# Patient Record
Sex: FEMALE | Race: WHITE | Employment: OTHER | URBAN - METROPOLITAN AREA
[De-identification: names, ages, dates, MRNs, and addresses within clinical notes are randomized per-mention and may not be internally consistent; named-entity substitution may affect disease eponyms.]

---

## 2017-01-10 RX ORDER — BLOOD SUGAR DIAGNOSTIC
STRIP MISCELLANEOUS
Qty: 150 STRIP | Refills: 0 | Status: SHIPPED | OUTPATIENT
Start: 2017-01-10 | End: 2017-06-14 | Stop reason: SDUPTHER

## 2017-01-19 ENCOUNTER — TELEPHONE (OUTPATIENT)
Dept: FAMILY MEDICINE CLINIC | Age: 67
End: 2017-01-19

## 2017-01-19 DIAGNOSIS — Z12.11 COLON CANCER SCREENING: Primary | ICD-10-CM

## 2017-01-19 NOTE — TELEPHONE ENCOUNTER
I spoke with patient. He/she has decline colonoscopy screening. Patient is in agreement to do FOBT. Please order FOBT. I will mail to patient.

## 2017-01-20 DIAGNOSIS — Z12.11 COLON CANCER SCREENING: ICD-10-CM

## 2017-01-26 ENCOUNTER — DOCUMENTATION ONLY (OUTPATIENT)
Dept: FAMILY MEDICINE CLINIC | Age: 67
End: 2017-01-26

## 2017-03-04 LAB — HEMOCCULT STL QL IA: POSITIVE

## 2017-03-07 ENCOUNTER — OFFICE VISIT (OUTPATIENT)
Dept: FAMILY MEDICINE CLINIC | Age: 67
End: 2017-03-07

## 2017-03-07 VITALS
SYSTOLIC BLOOD PRESSURE: 160 MMHG | RESPIRATION RATE: 18 BRPM | TEMPERATURE: 97.1 F | HEART RATE: 82 BPM | HEIGHT: 62 IN | OXYGEN SATURATION: 97 % | DIASTOLIC BLOOD PRESSURE: 83 MMHG | BODY MASS INDEX: 30 KG/M2 | WEIGHT: 163 LBS

## 2017-03-07 DIAGNOSIS — Z13.39 SCREENING FOR ALCOHOLISM: ICD-10-CM

## 2017-03-07 DIAGNOSIS — E11.9 CONTROLLED TYPE 2 DIABETES MELLITUS WITHOUT COMPLICATION, WITHOUT LONG-TERM CURRENT USE OF INSULIN (HCC): Primary | ICD-10-CM

## 2017-03-07 DIAGNOSIS — Z11.59 SCREENING FOR VIRAL AND CHLAMYDIAL DISEASES: ICD-10-CM

## 2017-03-07 DIAGNOSIS — E78.5 HYPERLIPIDEMIA, UNSPECIFIED HYPERLIPIDEMIA TYPE: ICD-10-CM

## 2017-03-07 DIAGNOSIS — I10 ESSENTIAL HYPERTENSION: ICD-10-CM

## 2017-03-07 DIAGNOSIS — E55.9 VITAMIN D DEFICIENCY: ICD-10-CM

## 2017-03-07 DIAGNOSIS — Z12.11 SCREEN FOR COLON CANCER: ICD-10-CM

## 2017-03-07 DIAGNOSIS — M54.16 LUMBAR RADICULOPATHY, RIGHT: ICD-10-CM

## 2017-03-07 DIAGNOSIS — Z00.00 ROUTINE GENERAL MEDICAL EXAMINATION AT A HEALTH CARE FACILITY: ICD-10-CM

## 2017-03-07 DIAGNOSIS — Z11.8 SCREENING FOR VIRAL AND CHLAMYDIAL DISEASES: ICD-10-CM

## 2017-03-07 RX ORDER — CLONIDINE HYDROCHLORIDE 0.1 MG/1
TABLET ORAL
Qty: 270 TAB | Refills: 3 | Status: SHIPPED | OUTPATIENT
Start: 2017-03-07 | End: 2018-01-15 | Stop reason: SDUPTHER

## 2017-03-07 RX ORDER — GLIPIZIDE 5 MG/1
TABLET ORAL
Qty: 270 TAB | Refills: 3 | Status: SHIPPED | OUTPATIENT
Start: 2017-03-07 | End: 2017-04-10 | Stop reason: SDUPTHER

## 2017-03-07 RX ORDER — ALBUTEROL SULFATE 90 UG/1
2 AEROSOL, METERED RESPIRATORY (INHALATION)
Qty: 3 INHALER | Refills: 3 | Status: SHIPPED | OUTPATIENT
Start: 2017-03-07 | End: 2017-09-04 | Stop reason: SDUPTHER

## 2017-03-07 RX ORDER — LOSARTAN POTASSIUM 100 MG/1
100 TABLET ORAL DAILY
Qty: 90 TAB | Refills: 3 | Status: SHIPPED | OUTPATIENT
Start: 2017-03-07 | End: 2018-01-15 | Stop reason: SDUPTHER

## 2017-03-07 RX ORDER — METFORMIN HYDROCHLORIDE 1000 MG/1
1000 TABLET ORAL 2 TIMES DAILY
Qty: 180 TAB | Refills: 3 | Status: SHIPPED | OUTPATIENT
Start: 2017-03-07 | End: 2017-04-21 | Stop reason: SDUPTHER

## 2017-03-07 RX ORDER — FLUTICASONE PROPIONATE AND SALMETEROL 500; 50 UG/1; UG/1
1 POWDER RESPIRATORY (INHALATION) EVERY 12 HOURS
Qty: 3 INHALER | Refills: 3 | Status: SHIPPED | OUTPATIENT
Start: 2017-03-07 | End: 2017-11-19 | Stop reason: SDUPTHER

## 2017-03-07 RX ORDER — TRAMADOL HYDROCHLORIDE 50 MG/1
50 TABLET ORAL
Qty: 40 TAB | Refills: 0 | Status: SHIPPED | OUTPATIENT
Start: 2017-03-07 | End: 2017-12-18 | Stop reason: ALTCHOICE

## 2017-03-07 RX ORDER — ALBUTEROL SULFATE 2.5 MG/.5ML
2.5 SOLUTION RESPIRATORY (INHALATION)
Qty: 150 ML | Refills: 3 | Status: SHIPPED | OUTPATIENT
Start: 2017-03-07 | End: 2018-01-15 | Stop reason: SDUPTHER

## 2017-03-07 RX ORDER — ALBUTEROL SULFATE 1.25 MG/3ML
1.25 SOLUTION RESPIRATORY (INHALATION)
Qty: 540 ML | Refills: 3 | Status: SHIPPED | OUTPATIENT
Start: 2017-03-07 | End: 2019-02-18 | Stop reason: SDUPTHER

## 2017-03-07 NOTE — PROGRESS NOTES
Zabrina Saez is a 78 y.o. female who presents today for annual wellness exam. Patient c/o RT leg pain. Pain scale 10/10    Health Maintenance Due   Topic Date Due    Hepatitis C Screening  1950    DTaP/Tdap/Td series (1 - Tdap) 10/01/1970    Pneumococcal 65+ Low/Medium Risk (1 of 2 - PCV13) 10/01/2014    EYE EXAM RETINAL OR DILATED Q1  07/13/2016    INFLUENZA AGE 9 TO ADULT  08/01/2016    MEDICARE YEARLY EXAM  11/13/2016    FOOT EXAM Q1  11/13/2016    MICROALBUMIN Q1  02/03/2017    LIPID PANEL Q1  02/03/2017    HEMOGLOBIN A1C Q6M  02/24/2017           1. Have you been to the ER, urgent care clinic since your last visit? Hospitalized since your last visit? No    2. Have you seen or consulted any other health care providers outside of the [address] [place] [address] since your last visit? Include any pap smears or colon screening. Yes Where: podiatry    Learning Assessment 4/29/2015   PRIMARY LEARNER Patient   HIGHEST LEVEL OF EDUCATION - PRIMARY LEARNER  DID NOT GRADUATE HIGH SCHOOL   BARRIERS PRIMARY LEARNER NONE   CO-LEARNER CAREGIVER No   PRIMARY LANGUAGE ENGLISH    NEED -   LEARNER PREFERENCE PRIMARY OTHER (COMMENT)   ANSWERED BY patient   RELATIONSHIP SELF       Abuse Screening Questionnaire 3/26/2014   Do you ever feel afraid of your partner? N   Are you in a relationship with someone who physically or mentally threatens you? N   Is it safe for you to go home?  Nick Benson

## 2017-03-07 NOTE — ACP (ADVANCE CARE PLANNING)
Advance Care Planning    Advance Care Planning (ACP) Provider Conversation Snapshot    Date of ACP Conversation: 03/07/17  Persons included in Conversation:  patient  Length of ACP Conversation in minutes:  25 minutes    Authorized Decision Maker (if patient is incapable of making informed decisions):    This person is:   Healthcare Agent/Medical Power of  under Advance Directive          For Patients with Decision Making Capacity:   Values/Goals: Exploration of values, goals, and preferences if recovery is not expected, even with continued medical treatment in the event of:  Imminent death  Severe, permanent brain injury    Conversation Outcomes / Follow-Up Plan:   Recommended completion of Advance Directive form after review of ACP materials and conversation with prospective healthcare agent

## 2017-03-07 NOTE — MR AVS SNAPSHOT
Visit Information Date & Time Provider Department Dept. Phone Encounter #  
 3/7/2017  1:45 PM Saadia Machuca, [address] [phone] [id number] Follow-up Instructions Return in about 6 months (around 9/7/2017). Follow-up and Disposition History Upcoming Health Maintenance Date Due  
 EYE EXAM RETINAL OR DILATED Q1 10/7/2018* GLAUCOMA SCREENING Q2Y 7/13/2017 HEMOGLOBIN A1C Q6M 9/7/2017 BREAST CANCER SCRN MAMMOGRAM 9/28/2017 FOOT EXAM Q1 3/7/2018 MICROALBUMIN Q1 3/7/2018 LIPID PANEL Q1 3/7/2018 MEDICARE YEARLY EXAM 3/8/2018 COLONOSCOPY 10/21/2024 DTaP/Tdap/Td series (2 - Td) 3/7/2027 *Topic was postponed. The date shown is not the original due date. Allergies as of 3/7/2017  Review Complete On: 3/7/2017 By: Saadia Machuca MD  
  
 Severity Noted Reaction Type Reactions Cefdinir  04/29/2015    Rash Current Immunizations  Reviewed on 10/21/2014 Name Date Influenza High Dose Vaccine PF 11/13/2015 Influenza Vaccine 10/21/2014  2:56 PM  
  
 Not reviewed this visit You Were Diagnosed With   
  
 Codes Comments Controlled type 2 diabetes mellitus without complication, without long-term current use of insulin ([facility].)    -  Primary ICD-10-CM: E11.9 ICD-9-CM: 250.00 Essential hypertension     ICD-10-CM: I10 
ICD-9-CM: 401.9 Hyperlipidemia, unspecified hyperlipidemia type     ICD-10-CM: E78.5 ICD-9-CM: 272.4 Vitamin D deficiency     ICD-10-CM: E55.9 ICD-9-CM: 268.9 Normal body mass index (BMI)     ICD-10-CM: Z78.9 ICD-9-CM: V49.89 Screening for viral and chlamydial diseases     ICD-10-CM: Z11.59, Z11.8 ICD-9-CM: V73.98, V73.99 Routine general medical examination at a health care facility     ICD-10-CM: Z00.00 ICD-9-CM: V70.0 Screening for alcoholism     ICD-10-CM: Z13.89 ICD-9-CM: V79.1 Screen for colon cancer     ICD-10-CM: Z12.11 ICD-9-CM: V76.51   
 Lumbar radiculopathy, right     ICD-10-CM: M54.16 
ICD-9-CM: 724.4 Vitals BP Pulse Temp Resp Height(growth percentile) Weight(growth percentile) 160/83 (BP 1 Location: Right arm, BP Patient Position: Sitting) 82 97.1 °F (36.2 °C) (Oral) 18 5' 2\" (1.575 m) 163 lb (73.9 kg) SpO2 BMI OB Status Smoking Status 97% 29.81 kg/m2 Postmenopausal Former Smoker BMI and BSA Data Body Mass Index Body Surface Area  
 29.81 kg/m 2 1.8 m 2 Preferred Pharmacy Pharmacy Name Phone [address] [facility], [facility] [address] [facility] Your Updated Medication List  
  
   
This list is accurate as of: 3/7/17  2:13 PM.  Always use your most recent med list.  
  
  
  
  
 * albuterol sulfate SR 4 mg ER tablet Commonly known as:  PROVENTIL SR Take 1 Tab by mouth every twelve (12) hours. * albuterol 90 mcg/actuation inhaler Commonly known as:  VENTOLIN HFA Take 2 Puffs by inhalation every six (6) hours as needed for Wheezing. * albuterol sulfate 2.5 mg/0.5 mL Nebu nebulizer solution Commonly known as:  PROVENTIL;VENTOLIN  
0.5 mL by Nebulization route every four (4) hours as needed for Wheezing. * albuterol 1.25 mg/3 mL Nebu Commonly known as:  ACCUNEB  
3 mL by Nebulization route every four (4) hours as needed. CALCIUM 600 + D 600-125 mg-unit Tab Generic drug:  calcium-cholecalciferol (d3) Take  by mouth.  
  
 cloNIDine HCl 0.1 mg tablet Commonly known as:  CATAPRES  
1 in AM, 2 in PM  
  
 fluticasone-salmeterol 500-50 mcg/dose diskus inhaler Commonly known as:  ADVAIR DISKUS Take 1 Puff by inhalation every twelve (12) hours. * glipiZIDE 5 mg tablet Commonly known as:  Daysi Limb Take 1 tablet by mouth two (2) times a day. * glipiZIDE 5 mg tablet Commonly known as:  Daysi Limb Take 1 in AM, 2 in PM  
  
 * glucose blood VI test strips strip Commonly known as:  ACCU-CHEK COMFORT CURVE TEST Test blood sugar bid and prn for diagnosis 250.00 * ACCU-CHEK SMARTVIEW TEST STRIP strip Generic drug:  glucose blood VI test strips USE  STRIP TWICE DAILY AND AS NEEDED  
  
 guaiFENesin-codeine 100-10 mg/5 mL solution Commonly known as:  ROBITUSSIN AC  
5-10 mL every 6 hours if needed for cough. ipratropium 0.02 % nebulizer solution Commonly known as:  ATROVENT  
2.5 mL by Nebulization route four (4) times daily as needed for Wheezing. linagliptin 5 mg tablet Commonly known as:  [place] Take 1 Tab by mouth daily. * losartan 50 mg tablet Commonly known as:  COZAAR Take 1 tablet by mouth daily. * losartan 100 mg tablet Commonly known as:  COZAAR Take 1 Tab by mouth daily. magnesium 250 mg Tab Take  by mouth.  
  
 meclizine 25 mg tablet Commonly known as:  ANTIVERT Take 1 Tab by mouth three (3) times daily as needed for Dizziness. meloxicam 15 mg tablet Commonly known as:  MOBIC Take 1 Tab by mouth daily. * metFORMIN 1,000 mg tablet Commonly known as:  GLUCOPHAGE Take 1 Tab by mouth two (2) times daily (with meals). * metFORMIN 1,000 mg tablet Commonly known as:  GLUCOPHAGE Take 1 Tab by mouth two (2) times a day. methylPREDNISolone 4 mg tablet Commonly known as:  Minesh Funk Follow dose pack instructions  
  
 mupirocin calcium 2 % topical cream  
Commonly known as:  [employer] Apply  to affected area two (2) times a day. nystatin powder Commonly known as:  MYCOSTATIN Apply daily after bathing  
  
 nystatin-triamcinolone topical cream  
Commonly known as:  MYCOLOG II Apply  to affected area two (2) times a day. * omeprazole 40 mg capsule Commonly known as:  PRILOSEC Take 1 Cap by mouth daily. * omeprazole 20 mg capsule Commonly known as:  PRILOSEC Take 1 capsule by mouth daily.   
  
 OTHER  
 accuchek comfort curve monitor kit * tiZANidine 2 mg tablet Commonly known as:  Clearance Brisa Take 1 Tab by mouth three (3) times daily as needed. * tiZANidine 2 mg tablet Commonly known as:  Clearance Brisa Take 1 Tab by mouth three (3) times daily as needed for Pain. * tiZANidine 2 mg tablet Commonly known as:  Clearance Brisa Take 1 Tab by mouth three (3) times daily as needed. traMADol 50 mg tablet Commonly known as:  ULTRAM  
Take 1 Tab by mouth every eight (8) hours as needed for Pain. Max Daily Amount: 150 mg. VITAMIN D3 1,000 unit tablet Generic drug:  cholecalciferol Take  by mouth daily. * Notice: This list has 17 medication(s) that are the same as other medications prescribed for you. Read the directions carefully, and ask your doctor or other care provider to review them with you. Prescriptions Printed Refills  
 metFORMIN (GLUCOPHAGE) 1,000 mg tablet 3 Sig: Take 1 Tab by mouth two (2) times a day. Class: Print Route: Oral  
 cloNIDine HCl (CATAPRES) 0.1 mg tablet 3 Si in AM, 2 in PM  
 Class: Print  
 albuterol (VENTOLIN HFA) 90 mcg/actuation inhaler 3 Sig: Take 2 Puffs by inhalation every six (6) hours as needed for Wheezing. Class: Print Route: Inhalation  
 fluticasone-salmeterol (ADVAIR DISKUS) 500-50 mcg/dose diskus inhaler 3 Sig: Take 1 Puff by inhalation every twelve (12) hours. Class: Print Route: Inhalation  
 losartan (COZAAR) 100 mg tablet 3 Sig: Take 1 Tab by mouth daily. Class: Print Route: Oral  
 glipiZIDE (GLUCOTROL) 5 mg tablet 3 Sig: Take 1 in AM, 2 in PM  
 Class: Print  
 albuterol sulfate (PROVENTIL;VENTOLIN) 2.5 mg/0.5 mL nebu nebulizer solution 3 Si.5 mL by Nebulization route every four (4) hours as needed for Wheezing. Class: Print Route: Nebulization  
 albuterol (ACCUNEB) 1.25 mg/3 mL nebu 3 Sig: 3 mL by Nebulization route every four (4) hours as needed. Class: Print Route: Nebulization  
 traMADol (ULTRAM) 50 mg tablet 0 Sig: Take 1 Tab by mouth every eight (8) hours as needed for Pain. Max Daily Amount: 150 mg.  
 Class: Print Route: Oral  
  
We Performed the Following REFERRAL TO GASTROENTEROLOGY [TSX80 Custom] Comments:  
 Please evaluate patient for colonoscopy. REFERRAL TO PAIN MANAGEMENT [ZMZ293 Custom] Comments:  
 Please evaluate patient for lumbar radiculopathy. Follow-up Instructions Return in about 6 months (around 9/7/2017). To-Do List   
 03/07/2017 Lab:  CBC WITH AUTOMATED DIFF   
  
 03/07/2017 Lab:  HEMOGLOBIN A1C WITH EAG   
  
 03/07/2017 Lab:  HEPATITIS C AB   
  
 03/07/2017 Lab:  METABOLIC PANEL, COMPREHENSIVE   
  
 03/07/2017 Lab:  MICROALBUMIN, UR, RAND W/ MICROALBUMIN/CREA RATIO   
  
 03/07/2017 Lab:  T4, FREE   
  
 03/07/2017 Lab:  TSH 3RD GENERATION   
  
 03/07/2017 Lab:  VITAMIN D, 25 HYDROXY Referral Information Referral ID Referred By [facility]erred To  
  
 [id number] [place] [address] [address] Phone: [phone] Fax: [phone] Visits Status Start Date End Date 3 New Request 3/7/17 3/7/18 If your referral has a status of pending review or denied, additional information will be sent to support the outcome of this decision. Referral ID Referred By Referred To  
 [id number] Carmen Tamayo MD  
   [address] [place] [address] [address] Phone: [phone] Fax: [phone] Visits Status Start Date End Date 6 New Request 3/7/17 3/7/18 If your referral has a status of pending review or denied, additional information will be sent to support the outcome of this decision. Introducing [facility]!    
 [employer] introduces [employer] patient portal. Now you can access parts of your medical record, email your doctor's office, and request medication refills online. 1. In your internet browser, go to [email or web address] My Friends 2. Click on the First Time User? Click Here link in the Sign In box. You will see the New Member Sign Up page. 3. Enter your [employer] Access Code exactly as it appears below. You will not need to use this code after youve completed the sign-up process. If you do not sign up before the expiration date, you must request a new code. · [employer] Access Code: [email or web address] Expires: 6/5/2017  2:07 PM 
 
4. Enter the last four digits of your Social Security Number (xxxx) and Date of Birth (mm/dd/yyyy) as indicated and click Submit. You will be taken to the next sign-up page. 5. Create a TapTrak ID. This will be your Tap[employer]k login ID and cannot be changed, so think of one that is secure and easy to remember. 6. Create a [employer] password. You can change your password at any time. 7. Enter your Password Reset Question and Answer. This can be used at a later time if you forget your password. 8. Enter your e-mail address. You will receive e-mail notification when new information is available in [address] 9. Click Sign Up. You can now view and download portions of your medical record. 10. Click the Download Summary menu link to download a portable copy of your medical information. If you have questions, please visit the Frequently Asked Questions section of the [employer] website. Remember, [employer] is NOT to be used for urgent needs. For medical emergencies, dial 911. Now available from your iPhone and Android! Please provide this summary of care documentation to your next provider. Your primary care clinician is listed as Frnanie Fabian. If you have any questions after today's visit, please call [phone].

## 2017-03-07 NOTE — PROGRESS NOTES
HISTORY OF PRESENT ILLNESS  John Storey is a 78 y.o. female. HPI  aodm stable  hbp stable  Sciatica worsening  Review of Systems   Musculoskeletal: Positive for back pain. All other systems reviewed and are negative. Past Medical History:   Diagnosis Date    Asthma     Diabetes ([address]     Hypertension     Vaginitis due to Candida albicans 4/3/2014     Current Outpatient Prescriptions on File Prior to Visit   Medication Sig Dispense Refill    ACCU-CHEK SMARTVIEW TEST STRIP strip USE  STRIP TWICE DAILY AND AS NEEDED 150 Strip 0    guaiFENesin-codeine (ROBITUSSIN AC) 100-10 mg/5 mL solution 5-10 mL every 6 hours if needed for cough. 180 mL 1    metFORMIN (GLUCOPHAGE) 1,000 mg tablet TAKE ONE TABLET BY MOUTH TWICE DAILY WITH MEALS 180 Tab 0    tiZANidine (ZANAFLEX) 2 mg tablet Take 1 Tab by mouth three (3) times daily as needed. 90 Tab 1    cloNIDine HCl (CATAPRES) 0.1 mg tablet Take 1 Tab by mouth two (2) times a day. 180 Tab 3    albuterol (VENTOLIN HFA) 90 mcg/actuation inhaler Take 2 Puffs by inhalation every six (6) hours as needed for Wheezing. 3 Inhaler 3    fluticasone-salmeterol (ADVAIR DISKUS) 500-50 mcg/dose diskus inhaler Take 1 Puff by inhalation every twelve (12) hours. 3 Inhaler 3    losartan (COZAAR) 100 mg tablet Take 1 Tab by mouth daily. 90 Tab 3    metFORMIN (GLUCOPHAGE) 1,000 mg tablet Take 1 Tab by mouth two (2) times daily (with meals). 180 Tab 3    tiZANidine (ZANAFLEX) 2 mg tablet Take 1 Tab by mouth three (3) times daily as needed for Pain. 60 Tab 3    glipiZIDE (GLUCOTROL) 5 mg tablet Take 1 in AM, 2 in  Tab 3    albuterol sulfate (PROVENTIL;VENTOLIN) 2.5 mg/0.5 mL nebu nebulizer solution 0.5 mL by Nebulization route every four (4) hours as needed for Wheezing. 150 mL 3    albuterol (ACCUNEB) 1.25 mg/3 mL nebu 3 mL by Nebulization route every four (4) hours as needed. 540 mL 3    linagliptin (TRADJENTA) 5 mg tablet Take 1 Tab by mouth daily.  30 Tab 2    ipratropium (ATROVENT) 0.02 % nebulizer solution 2.5 mL by Nebulization route four (4) times daily as needed for Wheezing. 150 mL 2    meclizine (ANTIVERT) 25 mg tablet Take 1 Tab by mouth three (3) times daily as needed for Dizziness. 90 Tab 1    nystatin-triamcinolone (MYCOLOG II) topical cream Apply  to affected area two (2) times a day. 30 g 0    meloxicam (MOBIC) 15 mg tablet Take 1 Tab by mouth daily. 90 Tab 3    glucose blood VI test strips (ACCU-CHEK COMFORT CURVE TEST) strip Test blood sugar bid and prn for diagnosis 250.00 100 Strip 3    albuterol sulfate SR (PROVENTIL SR) 4 mg ER tablet Take 1 Tab by mouth every twelve (12) hours. 60 Tab 3    mupirocin calcium (BACTROBAN) 2 % topical cream Apply  to affected area two (2) times a day. 30 g 0    losartan (COZAAR) 50 mg tablet Take 1 tablet by mouth daily. 90 tablet 0    glipiZIDE (GLUCOTROL) 5 mg tablet Take 1 tablet by mouth two (2) times a day. 180 tablet 0    omeprazole (PRILOSEC) 40 mg capsule Take 1 Cap by mouth daily. 90 Cap 3    tiZANidine (ZANAFLEX) 2 mg tablet Take 1 Tab by mouth three (3) times daily as needed. 60 Tab 0    magnesium 250 mg tab Take  by mouth.  calcium-cholecalciferol, d3, (CALCIUM 600 + D) 600-125 mg-unit tab Take  by mouth.  cholecalciferol (VITAMIN D3) 1,000 unit tablet Take  by mouth daily.  nystatin (MYCOSTATIN) powder Apply daily after bathing 60 g 3    OTHER accuchek comfort curve monitor kit 1 Device 0    methylPREDNISolone (MEDROL DOSEPACK) 4 mg tablet Follow dose pack instructions 1 Dose Pack 0    omeprazole (PRILOSEC) 20 mg capsule Take 1 capsule by mouth daily. 60 capsule 1     No current facility-administered medications on file prior to visit.       Visit Vitals    /83 (BP 1 Location: Right arm, BP Patient Position: Sitting)    Pulse 82    Temp 97.1 °F (36.2 °C) (Oral)    Resp 18    Ht 5' 2\" (1.575 m)    Wt 163 lb (73.9 kg)    SpO2 97%    BMI 29.81 kg/m2         Physical Exam Constitutional: She is oriented to person, place, and time. She appears well-developed and well-nourished. No distress. Cardiovascular: Normal rate, regular rhythm, normal heart sounds and intact distal pulses. Exam reveals no gallop and no friction rub. No murmur heard. Musculoskeletal: Normal range of motion. She exhibits no edema, tenderness or deformity. Neurological: She is alert and oriented to person, place, and time. She has normal reflexes. She displays normal reflexes. No cranial nerve deficit. She exhibits normal muscle tone. Coordination normal.   Skin: She is not diaphoretic. Vitals reviewed. reviewed stool hemoccult +    ASSESSMENT and PLAN  hbp   aodm  Hl  Sciatica  Heme + stool  Plan  Refer to pain mgmt  Refer for colonoscopy  Fasting labs  This is a Subsequent Medicare Annual Wellness Visit providing Personalized Prevention Plan Services (PPPS) (Performed 12 months after initial AWV and PPPS )    I have reviewed the patient's medical history in detail and updated the computerized patient record. History     Past Medical History:   Diagnosis Date    Asthma     Diabetes ([facility] [address]     Hypertension     Vaginitis due to Candida albicans 4/3/2014      Past Surgical History:   Procedure Laterality Date    HX BREAST BIOPSY      x 3-4    HX  SECTION      x2    HX TUBAL LIGATION       Current Outpatient Prescriptions   Medication Sig Dispense Refill    ACCU-CHEK SMARTVIEW TEST STRIP strip USE  STRIP TWICE DAILY AND AS NEEDED 150 Strip 0    guaiFENesin-codeine (ROBITUSSIN AC) 100-10 mg/5 mL solution 5-10 mL every 6 hours if needed for cough. 180 mL 1    metFORMIN (GLUCOPHAGE) 1,000 mg tablet TAKE ONE TABLET BY MOUTH TWICE DAILY WITH MEALS 180 Tab 0    tiZANidine (ZANAFLEX) 2 mg tablet Take 1 Tab by mouth three (3) times daily as needed. 90 Tab 1    cloNIDine HCl (CATAPRES) 0.1 mg tablet Take 1 Tab by mouth two (2) times a day.  180 Tab 3    albuterol (VENTOLIN HFA) 90 mcg/actuation inhaler Take 2 Puffs by inhalation every six (6) hours as needed for Wheezing. 3 Inhaler 3    fluticasone-salmeterol (ADVAIR DISKUS) 500-50 mcg/dose diskus inhaler Take 1 Puff by inhalation every twelve (12) hours. 3 Inhaler 3    losartan (COZAAR) 100 mg tablet Take 1 Tab by mouth daily. 90 Tab 3    metFORMIN (GLUCOPHAGE) 1,000 mg tablet Take 1 Tab by mouth two (2) times daily (with meals). 180 Tab 3    tiZANidine (ZANAFLEX) 2 mg tablet Take 1 Tab by mouth three (3) times daily as needed for Pain. 60 Tab 3    glipiZIDE (GLUCOTROL) 5 mg tablet Take 1 in AM, 2 in  Tab 3    albuterol sulfate (PROVENTIL;VENTOLIN) 2.5 mg/0.5 mL nebu nebulizer solution 0.5 mL by Nebulization route every four (4) hours as needed for Wheezing. 150 mL 3    albuterol (ACCUNEB) 1.25 mg/3 mL nebu 3 mL by Nebulization route every four (4) hours as needed. 540 mL 3    linagliptin (TRADJENTA) 5 mg tablet Take 1 Tab by mouth daily. 30 Tab 2    ipratropium (ATROVENT) 0.02 % nebulizer solution 2.5 mL by Nebulization route four (4) times daily as needed for Wheezing. 150 mL 2    meclizine (ANTIVERT) 25 mg tablet Take 1 Tab by mouth three (3) times daily as needed for Dizziness. 90 Tab 1    nystatin-triamcinolone (MYCOLOG II) topical cream Apply  to affected area two (2) times a day. 30 g 0    meloxicam (MOBIC) 15 mg tablet Take 1 Tab by mouth daily. 90 Tab 3    glucose blood VI test strips (ACCU-CHEK COMFORT CURVE TEST) strip Test blood sugar bid and prn for diagnosis 250.00 100 Strip 3    albuterol sulfate SR (PROVENTIL SR) 4 mg ER tablet Take 1 Tab by mouth every twelve (12) hours. 60 Tab 3    mupirocin calcium (BACTROBAN) 2 % topical cream Apply  to affected area two (2) times a day. 30 g 0    losartan (COZAAR) 50 mg tablet Take 1 tablet by mouth daily. 90 tablet 0    glipiZIDE (GLUCOTROL) 5 mg tablet Take 1 tablet by mouth two (2) times a day.  180 tablet 0    omeprazole (PRILOSEC) 40 mg capsule Take 1 Cap by mouth daily. 90 Cap 3    tiZANidine (ZANAFLEX) 2 mg tablet Take 1 Tab by mouth three (3) times daily as needed. 60 Tab 0    magnesium 250 mg tab Take  by mouth.  calcium-cholecalciferol, d3, (CALCIUM 600 + D) 600-125 mg-unit tab Take  by mouth.  cholecalciferol (VITAMIN D3) 1,000 unit tablet Take  by mouth daily.  nystatin (MYCOSTATIN) powder Apply daily after bathing 60 g 3    OTHER accuchek comfort curve monitor kit 1 Device 0    methylPREDNISolone (MEDROL DOSEPACK) 4 mg tablet Follow dose pack instructions 1 Dose Pack 0    omeprazole (PRILOSEC) 20 mg capsule Take 1 capsule by mouth daily. 60 capsule 1     Allergies   Allergen Reactions    Cefdinir Rash     Family History   Problem Relation Age of Onset    Diabetes Sister     Diabetes Mother     Diabetes Father     Hypertension Mother     Hypertension Father      Social History   Substance Use Topics    Smoking status: Former Smoker    Smokeless tobacco: Never Used    Alcohol use No     Patient Active Problem List   Diagnosis Code    Dyspnea R06.00    Unspecified asthma(493.90)     Type II or unspecified type diabetes mellitus without mention of complication, not stated as uncontrolled E11.9    Essential hypertension, benign I10    Vaginitis due to Candida albicans B37.3       Depression Risk Factor Screening:     PHQ 2 / 9, over the last two weeks 3/7/2017   Little interest or pleasure in doing things Not at all   Feeling down, depressed or hopeless Not at all   Total Score PHQ 2 0     Alcohol Risk Factor Screening: On any occasion during the past 3 months, have you had more than 3 drinks containing alcohol? No    Do you average more than 7 drinks per week? No      Functional Ability and Level of Safety:     Hearing Loss   normal-to-mild    Activities of Daily Living   Self-care. Requires assistance with: no ADLs    Fall Risk     Fall Risk Assessment, last 12 mths 3/7/2017   Able to walk? Yes   Fall in past 12 months?  No Abuse Screen   Patient is not abused    Review of Systems   A comprehensive review of systems was negative except for: Neurological: positive for right sciatica    Physical Examination     Evaluation of Cognitive Function:  Mood/affect:  neutral  Appearance: age appropriate  Family member/caregiver input: no    Visit Vitals    /83 (BP 1 Location: Right arm, BP Patient Position: Sitting)    Pulse 82    Temp 97.1 °F (36.2 °C) (Oral)    Resp 18    Ht 5' 2\" (1.575 m)    Wt 163 lb (73.9 kg)    SpO2 97%    BMI 29.81 kg/m2     General:  Alert, cooperative, no distress, appears stated age. Head:  Normocephalic, without obvious abnormality, atraumatic. Eyes:  Conjunctivae/corneas clear. PERRL, EOMs intact. Fundi benign. Ears:  Normal TMs and external ear canals both ears. Nose: Nares normal. Septum midline. Mucosa normal. No drainage or sinus tenderness. Throat: Lips, mucosa, and tongue normal. Teeth and gums normal.   Neck: Supple, symmetrical, trachea midline, no adenopathy, thyroid: no enlargement/tenderness/nodules, no carotid bruit and no JVD. Back:   Symmetric, no curvature. ROM normal. No CVA tenderness. Lungs:   Clear to auscultation bilaterally. Chest wall:  No tenderness or deformity. Heart:  Regular rate and rhythm, S1, S2 normal, no murmur, click, rub or gallop. Breast Exam:  No tenderness, masses, or nipple abnormality. Abdomen:   Soft, non-tender. Bowel sounds normal. No masses,  No organomegaly. Genitalia:  Normal female without lesion, discharge or tenderness. Rectal:  Normal tone,  no masses or tenderness  Guaiac negative stool. Extremities: Extremities normal, atraumatic, no cyanosis or edema. Pulses: 2+ and symmetric all extremities. Skin: Skin color, texture, turgor normal. No rashes or lesions. Lymph nodes: Cervical, supraclavicular, and axillary nodes normal.   Neurologic: CNII-XII intact. Normal strength, sensation and reflexes throughout. Patient Care Team:  Checo Onofre MD as PCP - General (Internal Medicine)  Concepcion Martinez RN as Ambulatory Care Navigator    Advice/Referrals/Counseling   Education and counseling provided:  Are appropriate based on today's review and evaluation  End-of-Life planning (with patient's consent)  Pneumococcal Vaccine  Influenza Vaccine  Screening Mammography  Screening Pap and pelvic (covered once every 2 years)  Colorectal cancer screening tests  Bone mass measurement (DEXA)  Diabetes screening test      Assessment/Plan   current treatment plan is effective, no change in therapy.

## 2017-03-15 ENCOUNTER — TELEPHONE (OUTPATIENT)
Dept: FAMILY MEDICINE CLINIC | Age: 67
End: 2017-03-15

## 2017-03-15 NOTE — TELEPHONE ENCOUNTER
Pt was referred to a place in [place] to get her colonoscopy done. She thinks that is too far for her and is requesting on in [place] if possible. Please call pt back at earliest convenience.

## 2017-03-16 NOTE — TELEPHONE ENCOUNTER
That's dr Rosalinda Dial [facility], find out which MD does colonoscopies in their [facility]

## 2017-03-17 NOTE — TELEPHONE ENCOUNTER
Called their office, spoke with a nurse, she states they all do colonoscopies and the rotate offices.

## 2017-04-10 RX ORDER — GLIPIZIDE 5 MG/1
TABLET ORAL
Qty: 270 TAB | Refills: 0 | Status: SHIPPED | OUTPATIENT
Start: 2017-04-10 | End: 2017-09-19 | Stop reason: SDUPTHER

## 2017-04-18 ENCOUNTER — OFFICE VISIT (OUTPATIENT)
Dept: FAMILY MEDICINE CLINIC | Age: 67
End: 2017-04-18

## 2017-04-18 VITALS
DIASTOLIC BLOOD PRESSURE: 75 MMHG | TEMPERATURE: 96.9 F | HEIGHT: 62 IN | WEIGHT: 157 LBS | HEART RATE: 93 BPM | SYSTOLIC BLOOD PRESSURE: 198 MMHG | RESPIRATION RATE: 22 BRPM | OXYGEN SATURATION: 96 % | BODY MASS INDEX: 28.89 KG/M2

## 2017-04-18 DIAGNOSIS — M24.811 INTERNAL DERANGEMENT OF RIGHT SHOULDER: ICD-10-CM

## 2017-04-18 DIAGNOSIS — M35.3 PMR (POLYMYALGIA RHEUMATICA) (HCC): ICD-10-CM

## 2017-04-18 DIAGNOSIS — G56.03 BILATERAL CARPAL TUNNEL SYNDROME: Primary | ICD-10-CM

## 2017-04-18 RX ORDER — ACETAMINOPHEN AND CODEINE PHOSPHATE 300; 30 MG/1; MG/1
1 TABLET ORAL
Qty: 30 TAB | Refills: 0 | Status: SHIPPED | OUTPATIENT
Start: 2017-04-18 | End: 2017-12-18 | Stop reason: ALTCHOICE

## 2017-04-18 NOTE — PROGRESS NOTES
Chief Complaint   Patient presents with    Numbness     patient stated she has tingling and numbness from shoulders down to her hands     pain scale 10/10     1. Have you been to the ER, urgent care clinic since your last visit? Hospitalized since your last visit? No    2. Have you seen or consulted any other health care providers outside of the [address] [place] [address] since your last visit? Include any pap smears or colon screening.  Yes Where: Dr. Tracee Coleman

## 2017-04-18 NOTE — PROGRESS NOTES
HISTORY OF PRESENT ILLNESS  Ermelinda Hurst is a 78 y.o. female. HPI  cts worsening with weakening  x 2  Sciatica improving  r shoulder pain  Review of Systems   Musculoskeletal: Positive for back pain, joint pain and neck pain. Neurological: Positive for sensory change. All other systems reviewed and are negative. Past Medical History:   Diagnosis Date    Asthma     Diabetes ([address]     Hypertension     Vaginitis due to Candida albicans 4/3/2014     Current Outpatient Prescriptions on File Prior to Visit   Medication Sig Dispense Refill    glipiZIDE (GLUCOTROL) 5 mg tablet TAKE ONE TABLET BY MOUTH IN THE MORNING AND TWO IN THE EVENING 270 Tab 0    metFORMIN (GLUCOPHAGE) 1,000 mg tablet Take 1 Tab by mouth two (2) times a day. 180 Tab 3    cloNIDine HCl (CATAPRES) 0.1 mg tablet 1 in AM, 2 in  Tab 3    albuterol (VENTOLIN HFA) 90 mcg/actuation inhaler Take 2 Puffs by inhalation every six (6) hours as needed for Wheezing. 3 Inhaler 3    fluticasone-salmeterol (ADVAIR DISKUS) 500-50 mcg/dose diskus inhaler Take 1 Puff by inhalation every twelve (12) hours. 3 Inhaler 3    losartan (COZAAR) 100 mg tablet Take 1 Tab by mouth daily. 90 Tab 3    albuterol sulfate (PROVENTIL;VENTOLIN) 2.5 mg/0.5 mL nebu nebulizer solution 0.5 mL by Nebulization route every four (4) hours as needed for Wheezing. 150 mL 3    albuterol (ACCUNEB) 1.25 mg/3 mL nebu 3 mL by Nebulization route every four (4) hours as needed. 540 mL 3    traMADol (ULTRAM) 50 mg tablet Take 1 Tab by mouth every eight (8) hours as needed for Pain. Max Daily Amount: 150 mg. 40 Tab 0    ACCU-CHEK SMARTVIEW TEST STRIP strip USE  STRIP TWICE DAILY AND AS NEEDED 150 Strip 0    methylPREDNISolone (MEDROL DOSEPACK) 4 mg tablet Follow dose pack instructions 1 Dose Pack 0    tiZANidine (ZANAFLEX) 2 mg tablet Take 1 Tab by mouth three (3) times daily as needed.  90 Tab 1    metFORMIN (GLUCOPHAGE) 1,000 mg tablet Take 1 Tab by mouth two (2) times daily (with meals). 180 Tab 3    tiZANidine (ZANAFLEX) 2 mg tablet Take 1 Tab by mouth three (3) times daily as needed for Pain. 60 Tab 3    linagliptin (TRADJENTA) 5 mg tablet Take 1 Tab by mouth daily. 30 Tab 2    ipratropium (ATROVENT) 0.02 % nebulizer solution 2.5 mL by Nebulization route four (4) times daily as needed for Wheezing. 150 mL 2    meclizine (ANTIVERT) 25 mg tablet Take 1 Tab by mouth three (3) times daily as needed for Dizziness. 90 Tab 1    nystatin-triamcinolone (MYCOLOG II) topical cream Apply  to affected area two (2) times a day. 30 g 0    meloxicam (MOBIC) 15 mg tablet Take 1 Tab by mouth daily. 90 Tab 3    glucose blood VI test strips (ACCU-CHEK COMFORT CURVE TEST) strip Test blood sugar bid and prn for diagnosis 250.00 100 Strip 3    albuterol sulfate SR (PROVENTIL SR) 4 mg ER tablet Take 1 Tab by mouth every twelve (12) hours. 60 Tab 3    mupirocin calcium (BACTROBAN) 2 % topical cream Apply  to affected area two (2) times a day. 30 g 0    omeprazole (PRILOSEC) 20 mg capsule Take 1 capsule by mouth daily. 60 capsule 1    losartan (COZAAR) 50 mg tablet Take 1 tablet by mouth daily. 90 tablet 0    glipiZIDE (GLUCOTROL) 5 mg tablet Take 1 tablet by mouth two (2) times a day. 180 tablet 0    omeprazole (PRILOSEC) 40 mg capsule Take 1 Cap by mouth daily. 90 Cap 3    tiZANidine (ZANAFLEX) 2 mg tablet Take 1 Tab by mouth three (3) times daily as needed. 60 Tab 0    magnesium 250 mg tab Take  by mouth.  calcium-cholecalciferol, d3, (CALCIUM 600 + D) 600-125 mg-unit tab Take  by mouth.  cholecalciferol (VITAMIN D3) 1,000 unit tablet Take  by mouth daily.  nystatin (MYCOSTATIN) powder Apply daily after bathing 60 g 3    OTHER accuchek comfort curve monitor kit 1 Device 0    guaiFENesin-codeine (ROBITUSSIN AC) 100-10 mg/5 mL solution 5-10 mL every 6 hours if needed for cough. 180 mL 1     No current facility-administered medications on file prior to visit.       Visit Vitals    /75 (BP 1 Location: Right arm, BP Patient Position: Sitting)    Pulse 93    Temp 96.9 °F (36.1 °C) (Oral)    Resp 22    Ht 5' 2\" (1.575 m)    Wt 157 lb (71.2 kg)    SpO2 96%    BMI 28.72 kg/m2       Physical Exam   Constitutional: She appears well-developed and well-nourished. No distress. Musculoskeletal: She exhibits tenderness. She exhibits no edema or deformity. Skin: She is not diaphoretic. Vitals reviewed.    shoulder derangement    ASSESSMENT and PLAN  cts   Shoulder derangement  Plan  Refer to ortho, pain mgmt  Tylenol #3

## 2017-04-18 NOTE — MR AVS SNAPSHOT
Visit Information Date & Time Provider Department Dept. Phone Encounter #  
 4/18/2017  1:30 PM Fidelia Kimball, 3 [facility] [phone] [id number] Follow-up Instructions Return if symptoms worsen or fail to improve. Follow-up and Disposition History Your Appointments 9/5/2017  3:00 PM  
Follow Up with Fidelia Kimball MD  
3 [facility] [address] [facility]) Appt Note: f/u 6 months [address] [facility] [address] [phone]  
  
   
 [address] [facility] [address] [facility] Upcoming Health Maintenance Date Due  
 EYE EXAM RETINAL OR DILATED Q1 10/7/2018* GLAUCOMA SCREENING Q2Y 7/13/2017 HEMOGLOBIN A1C Q6M 9/7/2017 BREAST CANCER SCRN MAMMOGRAM 9/28/2017 FOOT EXAM Q1 3/7/2018 MICROALBUMIN Q1 3/7/2018 LIPID PANEL Q1 3/7/2018 MEDICARE YEARLY EXAM 3/8/2018 COLONOSCOPY 10/21/2024 DTaP/Tdap/Td series (2 - Td) 3/7/2027 *Topic was postponed. The date shown is not the original due date. Allergies as of 4/18/2017  Review Complete On: 4/18/2017 By: Fidelia Kimball MD  
  
 Severity Noted Reaction Type Reactions Cefdinir  04/29/2015    Rash Current Immunizations  Reviewed on 10/21/2014 Name Date Influenza High Dose Vaccine PF 11/13/2015 Influenza Vaccine 10/21/2014  2:56 PM  
  
 Not reviewed this visit You Were Diagnosed With   
  
 Codes Comments Bilateral carpal tunnel syndrome    -  Primary ICD-10-CM: G56.03 
ICD-9-CM: 354.0 Internal derangement of right shoulder     ICD-10-CM: M24.111 ICD-9-CM: 719.81 Vitals BP Pulse Temp Resp Height(growth percentile) Weight(growth percentile) 198/75 (BP 1 Location: Right arm, BP Patient Position: Sitting) 93 96.9 °F (36.1 °C) (Oral) 22 5' 2\" (1.575 m) 157 lb (71.2 kg) SpO2 BMI OB Status Smoking Status 96% 28.72 kg/m2 Postmenopausal Former Smoker BMI and BSA Data Body Mass Index Body Surface Area 28.72 kg/m 2 1.76 m 2 Preferred Pharmacy Pharmacy Name Phone [facility] [address], [address] Your Updated Medication List  
  
   
This list is accurate as of: 4/18/17  1:39 PM.  Always use your most recent med list.  
  
  
  
  
 acetaminophen-codeine 300-30 mg per tablet Commonly known as:  TYLENOL-CODEINE #3 Take 1 Tab by mouth every six (6) hours as needed for Pain. Max Daily Amount: 4 Tabs. * albuterol sulfate SR 4 mg ER tablet Commonly known as:  PROVENTIL SR Take 1 Tab by mouth every twelve (12) hours. * albuterol 90 mcg/actuation inhaler Commonly known as:  VENTOLIN HFA Take 2 Puffs by inhalation every six (6) hours as needed for Wheezing. * albuterol sulfate 2.5 mg/0.5 mL Nebu nebulizer solution Commonly known as:  PROVENTIL;VENTOLIN  
0.5 mL by Nebulization route every four (4) hours as needed for Wheezing. * albuterol 1.25 mg/3 mL Nebu Commonly known as:  ACCUNEB  
3 mL by Nebulization route every four (4) hours as needed. CALCIUM 600 + D 600-125 mg-unit Tab Generic drug:  calcium-cholecalciferol (d3) Take  by mouth.  
  
 cloNIDine HCl 0.1 mg tablet Commonly known as:  CATAPRES  
1 in AM, 2 in PM  
  
 fluticasone-salmeterol 500-50 mcg/dose diskus inhaler Commonly known as:  ADVAIR DISKUS Take 1 Puff by inhalation every twelve (12) hours. * glipiZIDE 5 mg tablet Commonly known as:  Ghulam Pears Take 1 tablet by mouth two (2) times a day. * glipiZIDE 5 mg tablet Commonly known as:  GLUCOTROL  
TAKE ONE TABLET BY MOUTH IN THE MORNING AND TWO IN THE EVENING  
  
 * glucose blood VI test strips strip Commonly known as:  ACCU-CHEK COMFORT CURVE TEST Test blood sugar bid and prn for diagnosis 250.00 * ACCU-CHEK SMARTVIEW TEST STRIP strip Generic drug:  glucose blood VI test strips USE  STRIP TWICE DAILY AND AS NEEDED  
  
 guaiFENesin-codeine 100-10 mg/5 mL solution Commonly known as:  ROBITUSSIN AC  
5-10 mL every 6 hours if needed for cough. ipratropium 0.02 % nebulizer solution Commonly known as:  ATROVENT  
2.5 mL by Nebulization route four (4) times daily as needed for Wheezing. linagliptin 5 mg tablet Commonly known as:  Merari Caroey Take 1 Tab by mouth daily. * losartan 50 mg tablet Commonly known as:  COZAAR Take 1 tablet by mouth daily. * losartan 100 mg tablet Commonly known as:  COZAAR Take 1 Tab by mouth daily. magnesium 250 mg Tab Take  by mouth.  
  
 meclizine 25 mg tablet Commonly known as:  ANTIVERT Take 1 Tab by mouth three (3) times daily as needed for Dizziness. meloxicam 15 mg tablet Commonly known as:  MOBIC Take 1 Tab by mouth daily. * metFORMIN 1,000 mg tablet Commonly known as:  GLUCOPHAGE Take 1 Tab by mouth two (2) times daily (with meals). * metFORMIN 1,000 mg tablet Commonly known as:  GLUCOPHAGE Take 1 Tab by mouth two (2) times a day. methylPREDNISolone 4 mg tablet Commonly known as:  Rendapoly [place] Follow dose pack instructions  
  
 mupirocin calcium 2 % topical cream  
Commonly known as:  [facility] Apply  to affected area two (2) times a day. nystatin powder Commonly known as:  MYCOSTATIN Apply daily after bathing  
  
 nystatin-triamcinolone topical cream  
Commonly known as:  MYCOLOG II Apply  to affected area two (2) times a day. * omeprazole 40 mg capsule Commonly known as:  PRILOSEC Take 1 Cap by mouth daily. * omeprazole 20 mg capsule Commonly known as:  PRILOSEC Take 1 capsule by mouth daily. OTHER  
accuchek comfort curve monitor kit * tiZANidine 2 mg tablet Commonly known as:  Cuco Rival Take 1 Tab by mouth three (3) times daily as needed. * tiZANidine 2 mg tablet Commonly known as:  Cuco Rival Take 1 Tab by mouth three (3) times daily as needed for Pain. * tiZANidine 2 mg tablet Commonly known as:  Rush Schwalbe Take 1 Tab by mouth three (3) times daily as needed. traMADol 50 mg tablet Commonly known as:  ULTRAM  
Take 1 Tab by mouth every eight (8) hours as needed for Pain. Max Daily Amount: 150 mg. VITAMIN D3 1,000 unit tablet Generic drug:  cholecalciferol Take  by mouth daily. * Notice: This list has 17 medication(s) that are the same as other medications prescribed for you. Read the directions carefully, and ask your doctor or other care provider to review them with you. Prescriptions Printed Refills  
 acetaminophen-codeine (TYLENOL-CODEINE #3) 300-30 mg per tablet 0 Sig: Take 1 Tab by mouth every six (6) hours as needed for Pain. Max Daily Amount: 4 Tabs. Class: Print Route: Oral  
  
We Performed the Following REFERRAL TO ORTHOPEDIC SURGERY [REF62 Custom] Comments:  
 Please evaluate patient for CTS. REFERRAL TO PAIN MANAGEMENT [BRX581 Custom] Comments:  
 Please evaluate patient for shoulder derangement. Follow-up Instructions Return if symptoms worsen or fail to improve. Referral Information Referral ID Referred By Referred To  
  
 [id number] Jeevan Marino MD   
   [place] [address] [address] [facility], [address] [place] [address] Phone: [phone] Fax: [phone] Visits Status Start Date End Date 1 New Request 4/18/17 4/18/18 If your referral has a status of pending review or denied, additional information will be sent to support the outcome of this decision. Referral ID Referred By Referred To  
 [id number] [facility] [address] [facility] [address] [facility], [address] [place] [address] Phone: [phone] Fax: [phone] Visits Status Start Date End Date 1 New Request 4/18/17 4/18/18 If your referral has a status of pending review or denied, additional information will be sent to support the outcome of this decision. Introducing [facility]! Kaylah August introduces [employer] patient portal. Now you can access parts of your medical record, email your doctor's office, and request medication refills online. 1. In your internet browser, go to [email or web address] 2. Click on the First Time User? Click Here link in the Sign In box. You will see the New Member Sign Up page. 3. Enter your [employer] Access Code exactly as it appears below. You will not need to use this code after youve completed the sign-up process. If you do not sign up before the expiration date, you must request a new code. · [employer] Access Code: [email or web address] Expires: 6/5/2017  3:07 PM 
 
4. Enter the last four digits of your Social Security Number (xxxx) and Date of Birth (mm/dd/yyyy) as indicated and click Submit. You will be taken to the next sign-up page. 5. Create a [employer] ID. This will be your [employer] login ID and cannot be changed, so think of one that is secure and easy to remember. 6. Create a [employer] password. You can change your password at any time. 7. Enter your Password Reset Question and Answer. This can be used at a later time if you forget your password. 8. Enter your e-mail address. You will receive e-mail notification when new information is available in [address] 9. Click Sign Up. You can now view and download portions of your medical record. 10. Click the Download Summary menu link to download a portable copy of your medical information. If you have questions, please visit the Frequently Asked Questions section of the [employer] website. Remember, [employer] is NOT to be used for urgent needs. For medical emergencies, dial 911. Now available from your iPhone and Android! Please provide this summary of care documentation to your next provider. Your primary care clinician is listed as Gurpreet Rowell. If you have any questions after today's visit, please call [phone].

## 2017-04-21 RX ORDER — METFORMIN HYDROCHLORIDE 1000 MG/1
TABLET ORAL
Qty: 180 TAB | Refills: 0 | Status: SHIPPED | OUTPATIENT
Start: 2017-04-21 | End: 2017-09-19 | Stop reason: SDUPTHER

## 2017-05-01 ENCOUNTER — TELEPHONE (OUTPATIENT)
Dept: FAMILY MEDICINE CLINIC | Age: 67
End: 2017-05-01

## 2017-05-01 RX ORDER — PREDNISONE 20 MG/1
20 TABLET ORAL 2 TIMES DAILY
Qty: 60 TAB | Refills: 0 | Status: SHIPPED | OUTPATIENT
Start: 2017-05-01 | End: 2017-05-31 | Stop reason: SDUPTHER

## 2017-05-08 ENCOUNTER — HOSPITAL ENCOUNTER (OUTPATIENT)
Dept: LAB | Age: 67
Discharge: HOME | End: 2017-05-08

## 2017-05-08 ENCOUNTER — OFFICE VISIT (OUTPATIENT)
Dept: FAMILY MEDICINE CLINIC | Age: 67
End: 2017-05-08

## 2017-05-08 VITALS
SYSTOLIC BLOOD PRESSURE: 162 MMHG | RESPIRATION RATE: 18 BRPM | HEIGHT: 62 IN | HEART RATE: 88 BPM | TEMPERATURE: 98.5 F | DIASTOLIC BLOOD PRESSURE: 89 MMHG | OXYGEN SATURATION: 95 % | BODY MASS INDEX: 28.89 KG/M2 | WEIGHT: 157 LBS

## 2017-05-08 DIAGNOSIS — M35.9 COLLAGEN VASCULAR DISEASE (HCC): Primary | ICD-10-CM

## 2017-05-08 DIAGNOSIS — E11.9 CONTROLLED TYPE 2 DIABETES MELLITUS WITHOUT COMPLICATION, WITHOUT LONG-TERM CURRENT USE OF INSULIN (HCC): ICD-10-CM

## 2017-05-08 PROCEDURE — 99001 SPECIMEN HANDLING PT-LAB: CPT | Performed by: INTERNAL MEDICINE

## 2017-05-08 NOTE — PROGRESS NOTES
HISTORY OF PRESENT ILLNESS  Carlos Gómez is a 78 y.o. female. HPI  aodm stable  Recent pain, am stiffness and swelling in UE x 2  Seen by ortho, neuro  No established DX  Started on prednisone 1 week ago with some improvement  Nor a/w hip girdle pain, stiffness  Review of Systems   Musculoskeletal: Positive for joint pain. All other systems reviewed and are negative. Past Medical History:   Diagnosis Date    Asthma     Diabetes ([address]     Hypertension     Vaginitis due to Candida albicans 4/3/2014     Current Outpatient Prescriptions on File Prior to Visit   Medication Sig Dispense Refill    predniSONE (DELTASONE) 20 mg tablet Take 1 Tab by mouth two (2) times a day. 60 Tab 0    metFORMIN (GLUCOPHAGE) 1,000 mg tablet TAKE ONE TABLET BY MOUTH TWICE DAILY WITH MEALS 180 Tab 0    acetaminophen-codeine (TYLENOL-CODEINE #3) 300-30 mg per tablet Take 1 Tab by mouth every six (6) hours as needed for Pain. Max Daily Amount: 4 Tabs. 30 Tab 0    glipiZIDE (GLUCOTROL) 5 mg tablet TAKE ONE TABLET BY MOUTH IN THE MORNING AND TWO IN THE EVENING 270 Tab 0    cloNIDine HCl (CATAPRES) 0.1 mg tablet 1 in AM, 2 in  Tab 3    albuterol (VENTOLIN HFA) 90 mcg/actuation inhaler Take 2 Puffs by inhalation every six (6) hours as needed for Wheezing. 3 Inhaler 3    fluticasone-salmeterol (ADVAIR DISKUS) 500-50 mcg/dose diskus inhaler Take 1 Puff by inhalation every twelve (12) hours. 3 Inhaler 3    losartan (COZAAR) 100 mg tablet Take 1 Tab by mouth daily. 90 Tab 3    albuterol sulfate (PROVENTIL;VENTOLIN) 2.5 mg/0.5 mL nebu nebulizer solution 0.5 mL by Nebulization route every four (4) hours as needed for Wheezing. 150 mL 3    albuterol (ACCUNEB) 1.25 mg/3 mL nebu 3 mL by Nebulization route every four (4) hours as needed. 540 mL 3    traMADol (ULTRAM) 50 mg tablet Take 1 Tab by mouth every eight (8) hours as needed for Pain.  Max Daily Amount: 150 mg. 40 Tab 0    ACCU-CHEK SMARTVIEW TEST STRIP strip USE  STRIP TWICE DAILY AND AS NEEDED 150 Strip 0    methylPREDNISolone (MEDROL DOSEPACK) 4 mg tablet Follow dose pack instructions 1 Dose Pack 0    guaiFENesin-codeine (ROBITUSSIN AC) 100-10 mg/5 mL solution 5-10 mL every 6 hours if needed for cough. 180 mL 1    tiZANidine (ZANAFLEX) 2 mg tablet Take 1 Tab by mouth three (3) times daily as needed. 90 Tab 1    metFORMIN (GLUCOPHAGE) 1,000 mg tablet Take 1 Tab by mouth two (2) times daily (with meals). 180 Tab 3    tiZANidine (ZANAFLEX) 2 mg tablet Take 1 Tab by mouth three (3) times daily as needed for Pain. 60 Tab 3    linagliptin (TRADJENTA) 5 mg tablet Take 1 Tab by mouth daily. 30 Tab 2    ipratropium (ATROVENT) 0.02 % nebulizer solution 2.5 mL by Nebulization route four (4) times daily as needed for Wheezing. 150 mL 2    meclizine (ANTIVERT) 25 mg tablet Take 1 Tab by mouth three (3) times daily as needed for Dizziness. 90 Tab 1    nystatin-triamcinolone (MYCOLOG II) topical cream Apply  to affected area two (2) times a day. 30 g 0    meloxicam (MOBIC) 15 mg tablet Take 1 Tab by mouth daily. 90 Tab 3    glucose blood VI test strips (ACCU-CHEK COMFORT CURVE TEST) strip Test blood sugar bid and prn for diagnosis 250.00 100 Strip 3    albuterol sulfate SR (PROVENTIL SR) 4 mg ER tablet Take 1 Tab by mouth every twelve (12) hours. 60 Tab 3    mupirocin calcium (BACTROBAN) 2 % topical cream Apply  to affected area two (2) times a day. 30 g 0    omeprazole (PRILOSEC) 20 mg capsule Take 1 capsule by mouth daily. 60 capsule 1    losartan (COZAAR) 50 mg tablet Take 1 tablet by mouth daily. 90 tablet 0    glipiZIDE (GLUCOTROL) 5 mg tablet Take 1 tablet by mouth two (2) times a day. 180 tablet 0    omeprazole (PRILOSEC) 40 mg capsule Take 1 Cap by mouth daily. 90 Cap 3    tiZANidine (ZANAFLEX) 2 mg tablet Take 1 Tab by mouth three (3) times daily as needed. 60 Tab 0    magnesium 250 mg tab Take  by mouth.       calcium-cholecalciferol, d3, (CALCIUM 600 + D) 600-125 mg-unit tab Take  by mouth.  cholecalciferol (VITAMIN D3) 1,000 unit tablet Take  by mouth daily.  nystatin (MYCOSTATIN) powder Apply daily after bathing 60 g 3    OTHER accuchek comfort curve monitor kit 1 Device 0     No current facility-administered medications on file prior to visit. Visit Vitals    /89 (BP 1 Location: Right arm, BP Patient Position: Sitting)    Pulse 88    Temp 98.5 °F (36.9 °C) (Oral)    Resp 18    Ht 5' 2\" (1.575 m)    Wt 157 lb (71.2 kg)    SpO2 95%    BMI 28.72 kg/m2         Physical Exam   Constitutional: She appears well-developed and well-nourished. No distress. Musculoskeletal: She exhibits edema. She exhibits no tenderness. Reduced rom both UE  Synovial swelling in both hands     Skin: She is not diaphoretic. Vitals reviewed.     Reviewed recent lab testing  Esr 56  emg-CTS, no neuropathy  ASSESSMENT and PLAN  ill-defined collagen vascular disease   aodm  Plan  Labs  Refer to rheum

## 2017-05-08 NOTE — PROGRESS NOTES
Chief Complaint   Patient presents with    Carpal Tunnel     pain scale 6/10    Shoulder Pain     pain scale 6/10     1. Have you been to the ER, urgent care clinic since your last visit? Hospitalized since your last visit? No    2. Have you seen or consulted any other health care providers outside of the [address] [place] [address] since your last visit? Include any pap smears or colon screening.  Yes Where: Dr. Caryn Glover, podiatrist

## 2017-05-08 NOTE — LETTER
5/12/2017 10:12 AM 
 
Ms. Zachary Melendez 
[address] [facility] [address] [facility] [address] Dear Zachary Melendez: 
 
Please find your most recent results below. Resulted Orders LUPUS PROFILE Result Value Ref Range RNP Abs <0.2 0.0 - 0.9 AI Salgado Abs <0.2 0.0 - 0.9 AI Anti-DNA (DS) Ab, QT <1 0 - 9 IU/mL Comment:  
                                      Negative      <5 Equivocal  5 - 9 Positive      >9 Antinuclear Antibodies Direct Negative Negative Narrative Performed at:  [address]  [id number] : Mary Marino MD, Phone:  [phone] RHEUMATOID FACTOR, QL Result Value Ref Range Rheumatoid factor <10.0 0.0 - 13.9 IU/mL Narrative Performed at:  [address]  [id number] : Mary Marino MD, Phone:  [phone] COMPLEMENT, C4 Result Value Ref Range C4 Complement 31 14 - 44 mg/dL Narrative Performed at:  [address]  [id number] : Mary Marino MD, Phone:  [phone] CYCLIC CITRUL PEPTIDE AB, IGG Result Value Ref Range CCP Antibodies IgG/IgA 8 0 - 19 units Comment:  
                             Negative               <20 Weak positive      20 - 39 Moderate positive  40 - 59 Strong positive        >59 Narrative Performed at:  [address]  [id number] : Mary Marino MD, Phone:  [phone] COMPLEMENT, C3 Result Value Ref Range C3 Complement 128 82 - 167 mg/dL Narrative Performed at:  [address]  [id number] : Mary Marino MD, Phone:  [phone] RECOMMENDATIONS: 
 Call labs all negative Please call me if you have any questions: [phone] Sincerely, Rosaura Cali MD

## 2017-05-08 NOTE — MR AVS SNAPSHOT
Visit Information Date & Time Provider Department Dept. Phone Encounter #  
 5/8/2017  9:15 AM Rosaura Cali, 3 [facility] [phone] [id number] Your Appointments 9/5/2017  3:00 PM  
Follow Up with Rosaura Cali MD  
3 [facility] [address] [facility]) Appt Note: f/u 6 months [address] [facility] [address] [phone]  
  
   
 [address] Upcoming Health Maintenance Date Due  
 EYE EXAM RETINAL OR DILATED Q1 10/7/2018* GLAUCOMA SCREENING Q2Y 7/13/2017 INFLUENZA AGE 9 TO ADULT 8/1/2017 HEMOGLOBIN A1C Q6M 9/7/2017 BREAST CANCER SCRN MAMMOGRAM 9/28/2017 FOOT EXAM Q1 3/7/2018 MICROALBUMIN Q1 3/7/2018 LIPID PANEL Q1 3/7/2018 MEDICARE YEARLY EXAM 3/8/2018 COLONOSCOPY 10/21/2024 DTaP/Tdap/Td series (2 - Td) 3/7/2027 *Topic was postponed. The date shown is not the original due date. Allergies as of 5/8/2017  Review Complete On: 5/8/2017 By: Rosaura Cali MD  
  
 Severity Noted Reaction Type Reactions Cefdinir  04/29/2015    Rash Current Immunizations  Reviewed on 10/21/2014 Name Date Influenza High Dose Vaccine PF 11/13/2015 Influenza Vaccine 10/21/2014  2:56 PM  
  
 Not reviewed this visit You Were Diagnosed With   
  
 Codes Comments Collagen vascular disease ([facility].)    -  Primary ICD-10-CM: M35.9 ICD-9-CM: 446.20 Controlled type 2 diabetes mellitus without complication, without long-term current use of insulin ([facility]     ICD-10-CM: E11.9 ICD-9-CM: 250.00 Vitals BP Pulse Temp Resp Height(growth percentile) Weight(growth percentile) 162/89 (BP 1 Location: Right arm, BP Patient Position: Sitting) 88 98.5 °F (36.9 °C) (Oral) 18 5' 2\" (1.575 m) 157 lb (71.2 kg) SpO2 BMI OB Status Smoking Status 95% 28.72 kg/m2 Postmenopausal Former Smoker Vitals History BMI and BSA Data Body Mass Index Body Surface Area 28.72 kg/m 2 1.76 m 2 Preferred Pharmacy Pharmacy Name Phone [facility] Michell Akhtar, [address] [facility] [address] Your Updated Medication List  
  
   
This list is accurate as of: 5/8/17 10:05 AM.  Always use your most recent med list.  
  
  
  
  
 acetaminophen-codeine 300-30 mg per tablet Commonly known as:  TYLENOL-CODEINE #3 Take 1 Tab by mouth every six (6) hours as needed for Pain. Max Daily Amount: 4 Tabs. * albuterol sulfate SR 4 mg ER tablet Commonly known as:  PROVENTIL SR Take 1 Tab by mouth every twelve (12) hours. * albuterol 90 mcg/actuation inhaler Commonly known as:  VENTOLIN HFA Take 2 Puffs by inhalation every six (6) hours as needed for Wheezing. * albuterol sulfate 2.5 mg/0.5 mL Nebu nebulizer solution Commonly known as:  PROVENTIL;VENTOLIN  
0.5 mL by Nebulization route every four (4) hours as needed for Wheezing. * albuterol 1.25 mg/3 mL Nebu Commonly known as:  ACCUNEB  
3 mL by Nebulization route every four (4) hours as needed. CALCIUM 600 + D 600-125 mg-unit Tab Generic drug:  calcium-cholecalciferol (d3) Take  by mouth.  
  
 cloNIDine HCl 0.1 mg tablet Commonly known as:  CATAPRES  
1 in AM, 2 in PM  
  
 fluticasone-salmeterol 500-50 mcg/dose diskus inhaler Commonly known as:  ADVAIR DISKUS Take 1 Puff by inhalation every twelve (12) hours. * glipiZIDE 5 mg tablet Commonly known as:  Fátima Dexter Take 1 tablet by mouth two (2) times a day. * glipiZIDE 5 mg tablet Commonly known as:  GLUCOTROL  
TAKE ONE TABLET BY MOUTH IN THE MORNING AND TWO IN THE EVENING  
  
 * glucose blood VI test strips strip Commonly known as:  ACCU-CHEK COMFORT CURVE TEST Test blood sugar bid and prn for diagnosis 250.00 * ACCU-CHEK SMARTVIEW TEST STRIP strip Generic drug:  glucose blood VI test strips USE  STRIP TWICE DAILY AND AS NEEDED  
  
 guaiFENesin-codeine 100-10 mg/5 mL solution Commonly known as:  ROBITUSSIN AC  
5-10 mL every 6 hours if needed for cough. ipratropium 0.02 % nebulizer solution Commonly known as:  ATROVENT  
2.5 mL by Nebulization route four (4) times daily as needed for Wheezing. linagliptin 5 mg tablet Commonly known as:  Darlin Parlor Take 1 Tab by mouth daily. * losartan 50 mg tablet Commonly known as:  COZAAR Take 1 tablet by mouth daily. * losartan 100 mg tablet Commonly known as:  COZAAR Take 1 Tab by mouth daily. magnesium 250 mg Tab Take  by mouth.  
  
 meclizine 25 mg tablet Commonly known as:  ANTIVERT Take 1 Tab by mouth three (3) times daily as needed for Dizziness. meloxicam 15 mg tablet Commonly known as:  MOBIC Take 1 Tab by mouth daily. * metFORMIN 1,000 mg tablet Commonly known as:  GLUCOPHAGE Take 1 Tab by mouth two (2) times daily (with meals). * metFORMIN 1,000 mg tablet Commonly known as:  GLUCOPHAGE  
TAKE ONE TABLET BY MOUTH TWICE DAILY WITH MEALS  
  
 methylPREDNISolone 4 mg tablet Commonly known as:  Jean Carlos Aaron Follow dose pack instructions  
  
 mupirocin calcium 2 % topical cream  
Commonly known as:  [employer] Apply  to affected area two (2) times a day. nystatin powder Commonly known as:  MYCOSTATIN Apply daily after bathing  
  
 nystatin-triamcinolone topical cream  
Commonly known as:  MYCOLOG II Apply  to affected area two (2) times a day. * omeprazole 40 mg capsule Commonly known as:  PRILOSEC Take 1 Cap by mouth daily. * omeprazole 20 mg capsule Commonly known as:  PRILOSEC Take 1 capsule by mouth daily. OTHER  
accuchek comfort curve monitor kit  
  
 predniSONE 20 mg tablet Commonly known as:  Nani [place] Take 1 Tab by mouth two (2) times a day. * tiZANidine 2 mg tablet Commonly known as:  Radha Else Take 1 Tab by mouth three (3) times daily as needed. * tiZANidine 2 mg tablet Commonly known as:  Arleth Rye Take 1 Tab by mouth three (3) times daily as needed for Pain. * tiZANidine 2 mg tablet Commonly known as:  Arleth Rye Take 1 Tab by mouth three (3) times daily as needed. traMADol 50 mg tablet Commonly known as:  ULTRAM  
Take 1 Tab by mouth every eight (8) hours as needed for Pain. Max Daily Amount: 150 mg. VITAMIN D3 1,000 unit tablet Generic drug:  cholecalciferol Take  by mouth daily. * Notice: This list has 17 medication(s) that are the same as other medications prescribed for you. Read the directions carefully, and ask your doctor or other care provider to review them with you. To-Do List   
 05/08/2017 Lab:  COMPLEMENT, C3   
  
 05/08/2017 Lab:  COMPLEMENT, C4   
  
 05/08/2017 Lab:  CYCLIC CITRUL PEPTIDE AB, IGG   
  
 05/08/2017 Lab:  LUPUS PROFILE   
  
 05/08/2017 Lab:  RHEUMATOID FACTOR, QL Introducing [facility] SERVICES! Felisha Scott introduces [employer] patient portal. Now you can access parts of your medical record, email your doctor's office, and request medication refills online. 1. In your internet browser, go to [email or web address] 2. Click on the First Time User? Click Here link in the Sign In box. You will see the New Member Sign Up page. 3. Enter your [employer] Access Code exactly as it appears below. You will not need to use this code after youve completed the sign-up process. If you do not sign up before the expiration date, you must request a new code. · [employer] Access Code: [email or web address] Expires: 6/5/2017  3:07 PM 
 
4. Enter the last four digits of your Social Security Number (xxxx) and Date of Birth (mm/dd/yyyy) as indicated and click Submit. You will be taken to the next sign-up page. 5. Create a [employer] ID.  This will be your [employer] login ID and cannot be changed, so think of one that is secure and easy to remember. 6. Create a [employer] password. You can change your password at any time. 7. Enter your Password Reset Question and Answer. This can be used at a later time if you forget your password. 8. Enter your e-mail address. You will receive e-mail notification when new information is available in [address] 9. Click Sign Up. You can now view and download portions of your medical record. 10. Click the Download Summary menu link to download a portable copy of your medical information. If you have questions, please visit the Frequently Asked Questions section of the [employer] website. Remember, [employer] is NOT to be used for urgent needs. For medical emergencies, dial 911. Now available from your iPhone and Android! Please provide this summary of care documentation to your next provider. Your primary care clinician is listed as Mynor Briones. If you have any questions after today's visit, please call [phone].

## 2017-05-10 LAB
ANA SER QL: NEGATIVE
C3 SERPL-MCNC: 128 MG/DL (ref 82–167)
C4 SERPL-MCNC: 31 MG/DL (ref 14–44)
CCP IGA+IGG SERPL IA-ACNC: 8 UNITS (ref 0–19)
DSDNA AB SER-ACNC: <1 IU/ML (ref 0–9)
ENA RNP AB SER-ACNC: <0.2 AI (ref 0–0.9)
ENA SM AB SER-ACNC: <0.2 AI (ref 0–0.9)
RHEUMATOID FACT SERPL-ACNC: <10 IU/ML (ref 0–13.9)

## 2017-06-01 RX ORDER — PREDNISONE 20 MG/1
20 TABLET ORAL 2 TIMES DAILY
Qty: 60 TAB | Refills: 0 | Status: SHIPPED | OUTPATIENT
Start: 2017-06-01 | End: 2017-06-30 | Stop reason: SDUPTHER

## 2017-06-03 RX ORDER — IPRATROPIUM BROMIDE 0.5 MG/2.5ML
SOLUTION RESPIRATORY (INHALATION)
Qty: 150 ML | Refills: 0 | Status: SHIPPED | OUTPATIENT
Start: 2017-06-03 | End: 2018-01-15 | Stop reason: SDUPTHER

## 2017-06-14 RX ORDER — BLOOD SUGAR DIAGNOSTIC
STRIP MISCELLANEOUS
Qty: 150 STRIP | Refills: 0 | Status: SHIPPED | OUTPATIENT
Start: 2017-06-14 | End: 2017-10-20 | Stop reason: SDUPTHER

## 2017-06-30 ENCOUNTER — OFFICE VISIT (OUTPATIENT)
Dept: FAMILY MEDICINE CLINIC | Age: 67
End: 2017-06-30

## 2017-06-30 VITALS
HEIGHT: 62 IN | SYSTOLIC BLOOD PRESSURE: 156 MMHG | OXYGEN SATURATION: 95 % | HEART RATE: 103 BPM | RESPIRATION RATE: 18 BRPM | WEIGHT: 156 LBS | TEMPERATURE: 98 F | DIASTOLIC BLOOD PRESSURE: 80 MMHG | BODY MASS INDEX: 28.71 KG/M2

## 2017-06-30 DIAGNOSIS — E11.9 CONTROLLED TYPE 2 DIABETES MELLITUS WITHOUT COMPLICATION, WITHOUT LONG-TERM CURRENT USE OF INSULIN (HCC): Primary | ICD-10-CM

## 2017-06-30 DIAGNOSIS — J45.30 MILD PERSISTENT ASTHMA WITHOUT COMPLICATION: ICD-10-CM

## 2017-06-30 DIAGNOSIS — Z01.818 PREOP EXAMINATION: ICD-10-CM

## 2017-06-30 DIAGNOSIS — I10 ESSENTIAL HYPERTENSION: ICD-10-CM

## 2017-06-30 RX ORDER — PREDNISONE 20 MG/1
20 TABLET ORAL 2 TIMES DAILY
Qty: 60 TAB | Refills: 0 | Status: SHIPPED | OUTPATIENT
Start: 2017-06-30 | End: 2017-12-18 | Stop reason: ALTCHOICE

## 2017-06-30 NOTE — MR AVS SNAPSHOT
Visit Information Date & Time Provider Department Dept. Phone Encounter #  
 6/30/2017 10:30 AM Jazz Flowers [email or web address] [phone] [id number] Follow-up Instructions Return if symptoms worsen or fail to improve. Follow-up and Disposition History Your Appointments 9/5/2017  3:00 PM  
Follow Up with Jazz Flowers MD  
[facility]) Appt Note: f/u 6 months [address] [facility] [address]  
[phone]  
  
   
 [address] [facility] Upcoming Health Maintenance Date Due  
 GLAUCOMA SCREENING Q2Y 7/13/2017 BREAST CANCER SCRN MAMMOGRAM 9/28/2017 EYE EXAM RETINAL OR DILATED Q1 10/7/2018* INFLUENZA AGE 9 TO ADULT 8/1/2017 HEMOGLOBIN A1C Q6M 9/7/2017 FOOT EXAM Q1 3/7/2018 MICROALBUMIN Q1 3/7/2018 LIPID PANEL Q1 3/7/2018 MEDICARE YEARLY EXAM 3/8/2018 COLONOSCOPY 10/21/2024 DTaP/Tdap/Td series (2 - Td) 3/7/2027 *Topic was postponed. The date shown is not the original due date. Allergies as of 6/30/2017  Review Complete On: 6/30/2017 By: Jazz Flowers MD  
  
 Severity Noted Reaction Type Reactions Cefdinir  04/29/2015    Rash Current Immunizations  Reviewed on 10/21/2014 Name Date Influenza High Dose Vaccine PF 11/13/2015 Influenza Vaccine 10/21/2014  2:56 PM  
  
 Not reviewed this visit You Were Diagnosed With   
  
 Codes Comments Controlled type 2 diabetes mellitus without complication, without long-term current use of insulin ([facility]    -  Primary ICD-10-CM: E11.9 ICD-9-CM: 250.00 Preop examination     ICD-10-CM: U21.704 ICD-9-CM: V72.84 Essential hypertension     ICD-10-CM: I10 
ICD-9-CM: 401.9 Mild persistent asthma without complication     NVJ-23-GY: J45.30 ICD-9-CM: 493.90 Vitals BP Pulse Temp Resp Height(growth percentile) Weight(growth percentile) 156/80 (BP 1 Location: Right arm, BP Patient Position: Sitting) (!) 103 98 °F (36.7 °C) (Oral) 18 5' 2\" (1.575 m) 156 lb (70.8 kg) SpO2 BMI OB Status Smoking Status 95% 28.53 kg/m2 Postmenopausal Former Smoker BMI and BSA Data Body Mass Index Body Surface Area 28.53 kg/m 2 1.76 m 2 Preferred Pharmacy Pharmacy Name Phone [facility] [facility] [address] Your Updated Medication List  
  
   
This list is accurate as of: 6/30/17 10:46 AM.  Always use your most recent med list.  
  
  
  
  
 acetaminophen-codeine 300-30 mg per tablet Commonly known as:  TYLENOL-CODEINE #3 Take 1 Tab by mouth every six (6) hours as needed for Pain. Max Daily Amount: 4 Tabs. * albuterol sulfate SR 4 mg ER tablet Commonly known as:  PROVENTIL SR Take 1 Tab by mouth every twelve (12) hours. * albuterol 90 mcg/actuation inhaler Commonly known as:  VENTOLIN HFA Take 2 Puffs by inhalation every six (6) hours as needed for Wheezing. * albuterol sulfate 2.5 mg/0.5 mL Nebu nebulizer solution Commonly known as:  PROVENTIL;VENTOLIN  
0.5 mL by Nebulization route every four (4) hours as needed for Wheezing. * albuterol 1.25 mg/3 mL Nebu Commonly known as:  ACCUNEB  
3 mL by Nebulization route every four (4) hours as needed. CALCIUM 600 + D 600-125 mg-unit Tab Generic drug:  calcium-cholecalciferol (d3) Take  by mouth.  
  
 cloNIDine HCl 0.1 mg tablet Commonly known as:  CATAPRES  
1 in AM, 2 in PM  
  
 fluticasone-salmeterol 500-50 mcg/dose diskus inhaler Commonly known as:  ADVAIR DISKUS Take 1 Puff by inhalation every twelve (12) hours. * glipiZIDE 5 mg tablet Commonly known as:  Suzanne He Take 1 tablet by mouth two (2) times a day. * glipiZIDE 5 mg tablet Commonly known as:  Suzanne He TAKE ONE TABLET BY MOUTH IN THE MORNING AND TWO IN THE EVENING  
  
 * glucose blood VI test strips strip Commonly known as:  ACCU-CHEK COMFORT CURVE TEST Test blood sugar bid and prn for diagnosis 250.00 * ACCU-CHEK SMARTVIEW TEST STRIP strip Generic drug:  glucose blood VI test strips USE ONE STRIP TO CHECK GLUCOSE TWICE DAILY AND AS NEEDED  
  
 ipratropium 0.02 % nebulizer solution Commonly known as:  ATROVENT  
INHALE 2.5ML VIA NEBULIZER FOUR TIMES DAILY AS NEEDED FOR WHEEZING. linagliptin 5 mg tablet Commonly known as:  Bridgette Jimbo Take 1 Tab by mouth daily. * losartan 50 mg tablet Commonly known as:  COZAAR Take 1 tablet by mouth daily. * losartan 100 mg tablet Commonly known as:  COZAAR Take 1 Tab by mouth daily. magnesium 250 mg Tab Take  by mouth.  
  
 meclizine 25 mg tablet Commonly known as:  ANTIVERT Take 1 Tab by mouth three (3) times daily as needed for Dizziness. meloxicam 15 mg tablet Commonly known as:  MOBIC Take 1 Tab by mouth daily. * metFORMIN 1,000 mg tablet Commonly known as:  GLUCOPHAGE Take 1 Tab by mouth two (2) times daily (with meals). * metFORMIN 1,000 mg tablet Commonly known as:  GLUCOPHAGE  
TAKE ONE TABLET BY MOUTH TWICE DAILY WITH MEALS  
  
 methylPREDNISolone 4 mg tablet Commonly known as:  [place] Follow dose pack instructions  
  
 mupirocin calcium 2 % topical cream  
Commonly known as:  [employer] Apply  to affected area two (2) times a day. nystatin powder Commonly known as:  MYCOSTATIN Apply daily after bathing  
  
 nystatin-triamcinolone topical cream  
Commonly known as:  MYCOLOG II Apply  to affected area two (2) times a day. * omeprazole 40 mg capsule Commonly known as:  PRILOSEC Take 1 Cap by mouth daily. * omeprazole 20 mg capsule Commonly known as:  PRILOSEC Take 1 capsule by mouth daily.   
  
 * OTHER  
  
 * OTHER  
 accuchek comfort curve monitor kit  
  
 predniSONE 20 mg tablet Commonly known as:  Keith Charles Take 1 Tab by mouth two (2) times a day. * tiZANidine 2 mg tablet Commonly known as:  Jewelene Mead Take 1 Tab by mouth three (3) times daily as needed. * tiZANidine 2 mg tablet Commonly known as:  Jewelene Mead Take 1 Tab by mouth three (3) times daily as needed for Pain. * tiZANidine 2 mg tablet Commonly known as:  Jewelene Mead Take 1 Tab by mouth three (3) times daily as needed. traMADol 50 mg tablet Commonly known as:  ULTRAM  
Take 1 Tab by mouth every eight (8) hours as needed for Pain. Max Daily Amount: 150 mg. VITAMIN D3 1,000 unit tablet Generic drug:  cholecalciferol Take  by mouth daily. * Notice: This list has 19 medication(s) that are the same as other medications prescribed for you. Read the directions carefully, and ask your doctor or other care provider to review them with you. Prescriptions Sent to Pharmacy Refills  
 predniSONE (DELTASONE) 20 mg tablet 0 Sig: Take 1 Tab by mouth two (2) times a day. Class: Normal  
 Pharmacy: [address] #: [phone] Route: Oral  
  
Follow-up Instructions Return if symptoms worsen or fail to improve. Introducing [facility] SERVICES! Tootie David introduces [employer] patient portal. Now you can access parts of your medical record, email your doctor's office, and request medication refills online. 1. In your internet browser, go to [email or web address] 2. Click on the First Time User? Click Here link in the Sign In box. You will see the New Member Sign Up page. 3. Enter your [employer] Access Code exactly as it appears below. You will not need to use this code after youve completed the sign-up process. If you do not sign up before the expiration date, you must request a new code. · [employer] Access Code: [email or web address] Expires: 9/28/2017 10:46 AM 
 
4. Enter the last four digits of your Social Security Number (xxxx) and Date of Birth (mm/dd/yyyy) as indicated and click Submit. You will be taken to the next sign-up page. 5. Create a [employer] ID. This will be your [employer] login ID and cannot be changed, so think of one that is secure and easy to remember. 6. Create a [employer] password. You can change your password at any time. 7. Enter your Password Reset Question and Answer. This can be used at a later time if you forget your password. 8. Enter your e-mail address. You will receive e-mail notification when new information is available in [address] 9. Click Sign Up. You can now view and download portions of your medical record. 10. Click the Download Summary menu link to download a portable copy of your medical information. If you have questions, please visit the Frequently Asked Questions section of the [employer] website. Remember, [employer] is NOT to be used for urgent needs. For medical emergencies, dial 911. Now available from your iPhone and Android! Please provide this summary of care documentation to your next provider. Your primary care clinician is listed as Ankush Phillip. If you have any questions after today's visit, please call [phone].

## 2017-06-30 NOTE — PROGRESS NOTES
Preoperative Evaluation    Date of Exam: 2017    Stefania Duffy is a 78 y.o. female (:1950) who presents for preoperative evaluation. Procedure/Surgery:r carpal tunnel release  Date of Procedure/Surgery: 2017  Surgeon: Dr. Gladys Cruz: Shreya VILLALOBOS  Primary Physician: Svetlana Meek MD  Latex Allergy: no    Problem List:     Patient Active Problem List    Diagnosis Date Noted    Vaginitis due to Candida albicans 2014    Dyspnea 2014    Unspecified asthma 2014    Type II or unspecified type diabetes mellitus without mention of complication, not stated as uncontrolled 2014    Essential hypertension, benign 2014     Medical History:     Past Medical History:   Diagnosis Date    Asthma     Diabetes ([address]     Hypertension     Vaginitis due to Candida albicans 4/3/2014     Allergies: Allergies   Allergen Reactions    Cefdinir Rash      Medications:     Current Outpatient Prescriptions   Medication Sig    OTHER     ACCU-CHEK SMARTVIEW TEST STRIP strip USE ONE STRIP TO CHECK GLUCOSE TWICE DAILY AND AS NEEDED    ipratropium (ATROVENT) 0.02 % nebulizer solution INHALE 2.5ML VIA NEBULIZER FOUR TIMES DAILY AS NEEDED FOR WHEEZING.  metFORMIN (GLUCOPHAGE) 1,000 mg tablet TAKE ONE TABLET BY MOUTH TWICE DAILY WITH MEALS    acetaminophen-codeine (TYLENOL-CODEINE #3) 300-30 mg per tablet Take 1 Tab by mouth every six (6) hours as needed for Pain. Max Daily Amount: 4 Tabs.  glipiZIDE (GLUCOTROL) 5 mg tablet TAKE ONE TABLET BY MOUTH IN THE MORNING AND TWO IN THE EVENING    cloNIDine HCl (CATAPRES) 0.1 mg tablet 1 in AM, 2 in PM    albuterol (VENTOLIN HFA) 90 mcg/actuation inhaler Take 2 Puffs by inhalation every six (6) hours as needed for Wheezing.  fluticasone-salmeterol (ADVAIR DISKUS) 500-50 mcg/dose diskus inhaler Take 1 Puff by inhalation every twelve (12) hours.     losartan (COZAAR) 100 mg tablet Take 1 Tab by mouth daily.    albuterol sulfate (PROVENTIL;VENTOLIN) 2.5 mg/0.5 mL nebu nebulizer solution 0.5 mL by Nebulization route every four (4) hours as needed for Wheezing.  albuterol (ACCUNEB) 1.25 mg/3 mL nebu 3 mL by Nebulization route every four (4) hours as needed.  metFORMIN (GLUCOPHAGE) 1,000 mg tablet Take 1 Tab by mouth two (2) times daily (with meals).  nystatin-triamcinolone (MYCOLOG II) topical cream Apply  to affected area two (2) times a day.  glucose blood VI test strips (ACCU-CHEK COMFORT CURVE TEST) strip Test blood sugar bid and prn for diagnosis 250.00    albuterol sulfate SR (PROVENTIL SR) 4 mg ER tablet Take 1 Tab by mouth every twelve (12) hours.  mupirocin calcium (BACTROBAN) 2 % topical cream Apply  to affected area two (2) times a day.  glipiZIDE (GLUCOTROL) 5 mg tablet Take 1 tablet by mouth two (2) times a day.  magnesium 250 mg tab Take  by mouth.  calcium-cholecalciferol, d3, (CALCIUM 600 + D) 600-125 mg-unit tab Take  by mouth.  cholecalciferol (VITAMIN D3) 1,000 unit tablet Take  by mouth daily.  nystatin (MYCOSTATIN) powder Apply daily after bathing    OTHER accuchek comfort curve monitor kit    predniSONE (DELTASONE) 20 mg tablet Take 1 Tab by mouth two (2) times a day.  traMADol (ULTRAM) 50 mg tablet Take 1 Tab by mouth every eight (8) hours as needed for Pain. Max Daily Amount: 150 mg.    methylPREDNISolone (MEDROL DOSEPACK) 4 mg tablet Follow dose pack instructions    tiZANidine (ZANAFLEX) 2 mg tablet Take 1 Tab by mouth three (3) times daily as needed.  tiZANidine (ZANAFLEX) 2 mg tablet Take 1 Tab by mouth three (3) times daily as needed for Pain.  linagliptin (TRADJENTA) 5 mg tablet Take 1 Tab by mouth daily.  meclizine (ANTIVERT) 25 mg tablet Take 1 Tab by mouth three (3) times daily as needed for Dizziness.  meloxicam (MOBIC) 15 mg tablet Take 1 Tab by mouth daily.  omeprazole (PRILOSEC) 20 mg capsule Take 1 capsule by mouth daily.     losartan (COZAAR) 50 mg tablet Take 1 tablet by mouth daily.  omeprazole (PRILOSEC) 40 mg capsule Take 1 Cap by mouth daily.  tiZANidine (ZANAFLEX) 2 mg tablet Take 1 Tab by mouth three (3) times daily as needed. No current facility-administered medications for this visit.       Surgical History:     Past Surgical History:   Procedure Laterality Date    HX BREAST BIOPSY      x 3-4    HX  SECTION      x2    HX TUBAL LIGATION       Social History:     Social History     Social History    Marital status:      Spouse name: N/A    Number of children: N/A    Years of education: N/A     Social History Main Topics    Smoking status: Former Smoker    Smokeless tobacco: Never Used    Alcohol use No    Drug use: No    Sexual activity: No     Other Topics Concern    Not on file     Social History Narrative       Recent use of: NSAIDs and Steroids    Tetanus up to date: tetanus status unknown to the patient      Anesthesia Complications: N&V  History of abnormal bleeding : None  History of Blood Transfusions: no  Health Care Directive or Living Will: no    REVIEW OF SYSTEMS:  A comprehensive review of systems was negative except for: Musculoskeletal: positive for arthralgias    EXAM:   Visit Vitals    /80 (BP 1 Location: Right arm, BP Patient Position: Sitting)    Pulse (!) 103    Temp 98 °F (36.7 °C) (Oral)    Resp 18    Ht 5' 2\" (1.575 m)    Wt 156 lb (70.8 kg)    SpO2 95%    BMI 28.53 kg/m2     General appearance - alert, well appearing, and in no distress, oriented to person, place, and time, overweight and well hydrated  Mental status - alert, oriented to person, place, and time, normal mood, behavior, speech, dress, motor activity, and thought processes  Eyes - pupils equal and reactive, extraocular eye movements intact, sclera anicteric  Mouth - mucous membranes moist, pharynx normal without lesions and tongue normal  Neck - supple, no significant adenopathy, carotids upstroke normal bilaterally, no bruits  Lymphatics - no palpable lymphadenopathy, no hepatosplenomegaly  Chest - clear to auscultation, no wheezes, rales or rhonchi, symmetric air entry  Heart - normal rate, regular rhythm, normal S1, S2, no murmurs, rubs, clicks or gallops  Abdomen - soft, nontender, nondistended, no masses or organomegaly  bowel sounds normal  no bladder distension noted  no abdominal bruits  no pulsatile masses  no CVA tenderness  Back exam - full range of motion, no tenderness, palpable spasm or pain on motion, normal reflexes and strength bilateral lower extremities, sensory exam intact bilateral lower extremities  Neurological - alert, oriented, normal speech, no focal findings or movement disorder noted, screening mental status exam normal, neck supple without rigidity, cranial nerves II through XII intact, motor and sensory grossly normal bilaterally, normal muscle tone, no tremors, strength 5/5  Musculoskeletal - polyarticular tenderness and swelling  Extremities - peripheral pulses normal, no pedal edema, no clubbing or cyanosis, no pedal edema noted, intact peripheral pulses  Skin - normal coloration and turgor, no rashes, no suspicious skin lesions noted      DIAGNOSTICS:   1. EKG: EKG FINDINGS - normal EKG, normal sinus rhythm, nonspecific ST and T waves changes  2. CXR: n/a  3.  LabA1C 8.8A1C 8.8    IMPRESSION:   Diabetes mellitus  hbp  No contraindications to planned surgery  High risk by  Age, above conditions    Sarah Urbina MD   6/30/2017

## 2017-06-30 NOTE — PROGRESS NOTES
Chief Complaint   Patient presents with    Pre-op Exam    Hand Pain     pain scale 4/10     1. Have you been to the ER, urgent care clinic since your last visit? Hospitalized since your last visit? No    2. Have you seen or consulted any other health care providers outside of the [address] [place] [address] since your last visit? Include any pap smears or colon screening.  Yes Where: Dr. Ori Ornelas, Dr. Dr. Whitney Miles

## 2017-08-07 ENCOUNTER — PATIENT OUTREACH (OUTPATIENT)
Dept: FAMILY MEDICINE CLINIC | Age: 67
End: 2017-08-07

## 2017-08-07 NOTE — Clinical Note
Please order mammogram. Patient will have it done at [facility]. Last mammogram was 9/2015. Patient is aware.

## 2017-08-07 NOTE — PROGRESS NOTES
Patient Outreach made to patient. [facility] needed-mammogram. Patient is in agreement. I will have  to order mammogram. Patient will have mammogram done at [facility].

## 2017-08-08 ENCOUNTER — TELEPHONE (OUTPATIENT)
Dept: FAMILY MEDICINE CLINIC | Age: 67
End: 2017-08-08

## 2017-08-08 DIAGNOSIS — Z12.31 SCREENING MAMMOGRAM, ENCOUNTER FOR: Primary | ICD-10-CM

## 2017-08-08 NOTE — TELEPHONE ENCOUNTER
Please order mammogram. Patient will have it done at [facility]. Last mammogram was 9/2015. Patient is aware.

## 2017-09-05 RX ORDER — ALBUTEROL SULFATE 90 UG/1
AEROSOL, METERED RESPIRATORY (INHALATION)
Qty: 1 INHALER | Refills: 11 | Status: SHIPPED | OUTPATIENT
Start: 2017-09-05 | End: 2018-01-15 | Stop reason: SDUPTHER

## 2017-09-12 DIAGNOSIS — Z12.31 SCREENING MAMMOGRAM, ENCOUNTER FOR: ICD-10-CM

## 2017-09-19 ENCOUNTER — OFFICE VISIT (OUTPATIENT)
Dept: FAMILY MEDICINE CLINIC | Age: 67
End: 2017-09-19

## 2017-09-19 ENCOUNTER — TELEPHONE (OUTPATIENT)
Dept: FAMILY MEDICINE CLINIC | Age: 67
End: 2017-09-19

## 2017-09-19 VITALS
SYSTOLIC BLOOD PRESSURE: 168 MMHG | HEART RATE: 90 BPM | TEMPERATURE: 98.2 F | WEIGHT: 156 LBS | RESPIRATION RATE: 18 BRPM | OXYGEN SATURATION: 100 % | HEIGHT: 62 IN | DIASTOLIC BLOOD PRESSURE: 84 MMHG | BODY MASS INDEX: 28.71 KG/M2

## 2017-09-19 DIAGNOSIS — Z23 ENCOUNTER FOR IMMUNIZATION: ICD-10-CM

## 2017-09-19 DIAGNOSIS — M06.09 RHEUMATOID ARTHRITIS OF MULTIPLE SITES WITH NEGATIVE RHEUMATOID FACTOR (HCC): ICD-10-CM

## 2017-09-19 DIAGNOSIS — I10 ESSENTIAL HYPERTENSION: ICD-10-CM

## 2017-09-19 DIAGNOSIS — E11.9 CONTROLLED TYPE 2 DIABETES MELLITUS WITHOUT COMPLICATION, WITHOUT LONG-TERM CURRENT USE OF INSULIN (HCC): Primary | ICD-10-CM

## 2017-09-19 RX ORDER — LANCETS
EACH MISCELLANEOUS
Qty: 102 EACH | Refills: 3 | Status: SHIPPED | OUTPATIENT
Start: 2017-09-19 | End: 2018-01-15 | Stop reason: SDUPTHER

## 2017-09-19 RX ORDER — HYDROXYCHLOROQUINE SULFATE 200 MG/1
200 TABLET, FILM COATED ORAL DAILY
COMMUNITY
End: 2017-12-18 | Stop reason: ALTCHOICE

## 2017-09-19 RX ORDER — DICLOFENAC SODIUM 75 MG/1
TABLET, DELAYED RELEASE ORAL
COMMUNITY
End: 2018-01-15 | Stop reason: ALTCHOICE

## 2017-09-19 RX ORDER — PIOGLITAZONEHYDROCHLORIDE 15 MG/1
15 TABLET ORAL
Qty: 90 TAB | Refills: 1 | Status: SHIPPED | OUTPATIENT
Start: 2017-09-19 | End: 2018-01-15 | Stop reason: ALTCHOICE

## 2017-09-19 RX ORDER — ZOLPIDEM TARTRATE 10 MG/1
10 TABLET ORAL
Qty: 30 TAB | Refills: 1 | Status: SHIPPED | OUTPATIENT
Start: 2017-09-19 | End: 2017-11-15 | Stop reason: SDUPTHER

## 2017-09-19 RX ORDER — LANCETS
EACH MISCELLANEOUS
Qty: 100 EACH | Refills: 3 | Status: SHIPPED | OUTPATIENT
Start: 2017-09-19 | End: 2017-09-19 | Stop reason: SDUPTHER

## 2017-09-19 RX ORDER — GLIPIZIDE 5 MG/1
5 TABLET ORAL 2 TIMES DAILY
Qty: 180 TAB | Refills: 0 | Status: SHIPPED | OUTPATIENT
Start: 2017-09-19 | End: 2018-01-15 | Stop reason: ALTCHOICE

## 2017-09-19 NOTE — MR AVS SNAPSHOT
Visit Information Date & Time Provider Department Dept. Phone Encounter #  
 9/19/2017 11:15 AM Sina Limon, [employer] [phone] [id number] Follow-up Instructions Return in about 3 months (around 12/19/2017). Upcoming Health Maintenance Date Due  
 GLAUCOMA SCREENING Q2Y 7/13/2017 INFLUENZA AGE 9 TO ADULT 8/1/2017 EYE EXAM RETINAL OR DILATED Q1 10/7/2018* FOOT EXAM Q1 3/7/2018 MICROALBUMIN Q1 3/7/2018 LIPID PANEL Q1 3/7/2018 MEDICARE YEARLY EXAM 3/8/2018 HEMOGLOBIN A1C Q6M 3/19/2018 BREAST CANCER SCRN MAMMOGRAM 8/15/2019 COLONOSCOPY 10/21/2024 DTaP/Tdap/Td series (2 - Td) 3/7/2027 *Topic was postponed. The date shown is not the original due date. Allergies as of 9/19/2017  Review Complete On: 9/19/2017 By: Sina Limon MD  
  
 Severity Noted Reaction Type Reactions Cefdinir  04/29/2015    Rash Current Immunizations  Reviewed on 10/21/2014 Name Date Influenza High Dose Vaccine PF  Incomplete, 11/13/2015 Influenza Vaccine 10/21/2014  2:56 PM  
  
 Not reviewed this visit You Were Diagnosed With   
  
 Codes Comments Controlled type 2 diabetes mellitus without complication, without long-term current use of insulin ([address]    -  Primary ICD-10-CM: E11.9 ICD-9-CM: 250.00 Essential hypertension     ICD-10-CM: I10 
ICD-9-CM: 401.9 Rheumatoid arthritis of multiple sites with negative rheumatoid factor (HCC)     ICD-10-CM: M06.09 
ICD-9-CM: 714.0 Encounter for immunization     ICD-10-CM: F61 ICD-9-CM: V03.89 Vitals BP Pulse Temp Resp Height(growth percentile) Weight(growth percentile) 168/84 (BP 1 Location: Right arm, BP Patient Position: Sitting) 90 98.2 °F (36.8 °C) (Oral) 18 5' 2\" (1.575 m) 156 lb (70.8 kg) SpO2 BMI OB Status Smoking Status 100% 28.53 kg/m2 Postmenopausal Former Smoker BMI and BSA Data Body Mass Index Body Surface Area 28.53 kg/m 2 1.76 m 2 Preferred Pharmacy Pharmacy Name Phone [facility] [place], [address] Your Updated Medication List  
  
   
This list is accurate as of: 9/19/17 12:10 PM.  Always use your most recent med list.  
  
  
  
  
 acetaminophen-codeine 300-30 mg per tablet Commonly known as:  TYLENOL-CODEINE #3 Take 1 Tab by mouth every six (6) hours as needed for Pain. Max Daily Amount: 4 Tabs. * albuterol sulfate SR 4 mg ER tablet Commonly known as:  PROVENTIL SR Take 1 Tab by mouth every twelve (12) hours. * albuterol sulfate 2.5 mg/0.5 mL Nebu nebulizer solution Commonly known as:  PROVENTIL;VENTOLIN  
0.5 mL by Nebulization route every four (4) hours as needed for Wheezing. * albuterol 1.25 mg/3 mL Nebu Commonly known as:  ACCUNEB  
3 mL by Nebulization route every four (4) hours as needed. * VENTOLIN HFA 90 mcg/actuation inhaler Generic drug:  albuterol INHALE TWO PUFFS BY MOUTH EVERY 6 HOURS AS NEEDED FOR  WHEEZING  
  
 CALCIUM 600 + D 600-125 mg-unit Tab Generic drug:  calcium-cholecalciferol (d3) Take  by mouth.  
  
 cloNIDine HCl 0.1 mg tablet Commonly known as:  CATAPRES  
1 in AM, 2 in PM  
  
 diclofenac EC 75 mg EC tablet Commonly known as:  VOLTAREN Take  by mouth. fluticasone-salmeterol 500-50 mcg/dose diskus inhaler Commonly known as:  ADVAIR DISKUS Take 1 Puff by inhalation every twelve (12) hours. * glipiZIDE 5 mg tablet Commonly known as:  Aldean Mary Take 1 tablet by mouth two (2) times a day. * glipiZIDE 5 mg tablet Commonly known as:  Aldean Mary Take 1 Tab by mouth two (2) times a day. * glucose blood VI test strips strip Commonly known as:  ACCU-CHEK COMFORT CURVE TEST Test blood sugar bid and prn for diagnosis 250.00 * ACCU-CHEK SMARTVIEW TEST STRIP strip Generic drug:  glucose blood VI test strips USE ONE STRIP TO CHECK GLUCOSE TWICE DAILY AND AS NEEDED  
  
 hydroxychloroquine 200 mg tablet Commonly known as:  PLAQUENIL Take 200 mg by mouth daily. ipratropium 0.02 % Soln Commonly known as:  ATROVENT  
INHALE 2.5ML VIA NEBULIZER FOUR TIMES DAILY AS NEEDED FOR WHEEZING. Lancets Misc Commonly known as:  Juan J Juliette Test daily  
  
 linagliptin 5 mg tablet Commonly known as:  Paresh Both Take 1 Tab by mouth daily. losartan 100 mg tablet Commonly known as:  COZAAR Take 1 Tab by mouth daily. magnesium 250 mg Tab Take  by mouth.  
  
 meclizine 25 mg tablet Commonly known as:  ANTIVERT Take 1 Tab by mouth three (3) times daily as needed for Dizziness. meloxicam 15 mg tablet Commonly known as:  MOBIC Take 1 Tab by mouth daily. metFORMIN 1,000 mg tablet Commonly known as:  GLUCOPHAGE Take 1 Tab by mouth two (2) times daily (with meals). mupirocin calcium 2 % topical cream  
Commonly known as:  [employer] Apply  to affected area two (2) times a day. nystatin powder Commonly known as:  MYCOSTATIN Apply daily after bathing  
  
 nystatin-triamcinolone topical cream  
Commonly known as:  MYCOLOG II Apply  to affected area two (2) times a day. * omeprazole 40 mg capsule Commonly known as:  PRILOSEC Take 1 Cap by mouth daily. * omeprazole 20 mg capsule Commonly known as:  PRILOSEC Take 1 capsule by mouth daily. * OTHER  
  
 * OTHER  
accuchek comfort curve monitor kit  
  
 pioglitazone 15 mg tablet Commonly known as:  ACTOS Take 1 Tab by mouth daily (with dinner). predniSONE 20 mg tablet Commonly known as:  Moy Avery Take 1 Tab by mouth two (2) times a day. * tiZANidine 2 mg tablet Commonly known as:  Ezequiel Cordero Take 1 Tab by mouth three (3) times daily as needed. * tiZANidine 2 mg tablet Commonly known as:  Ezequiel Cordero Take 1 Tab by mouth three (3) times daily as needed for Pain. * tiZANidine 2 mg tablet Commonly known as:  Gay Sayres Take 1 Tab by mouth three (3) times daily as needed. traMADol 50 mg tablet Commonly known as:  ULTRAM  
Take 1 Tab by mouth every eight (8) hours as needed for Pain. Max Daily Amount: 150 mg. VITAMIN D3 1,000 unit tablet Generic drug:  cholecalciferol Take  by mouth daily. zolpidem 10 mg tablet Commonly known as:  AMBIEN Take 1 Tab by mouth nightly as needed for Sleep. Max Daily Amount: 10 mg.  
  
 * Notice: This list has 15 medication(s) that are the same as other medications prescribed for you. Read the directions carefully, and ask your doctor or other care provider to review them with you. Prescriptions Printed Refills  
 glipiZIDE (GLUCOTROL) 5 mg tablet 0 Sig: Take 1 Tab by mouth two (2) times a day. Class: Print Route: Oral  
 zolpidem (AMBIEN) 10 mg tablet 1 Sig: Take 1 Tab by mouth nightly as needed for Sleep. Max Daily Amount: 10 mg.  
 Class: Print Route: Oral  
  
Prescriptions Sent to Pharmacy Refills  
 pioglitazone (ACTOS) 15 mg tablet 1 Sig: Take 1 Tab by mouth daily (with dinner). Class: Normal  
 Pharmacy: [address] Ph #: [phone] Route: Oral  
 Lancets (LANCETS,ULTRA THIN) misc 3 Sig: Test daily Class: Normal  
 Pharmacy: [address] Ph #: [phone] We Performed the Following ADMIN INFLUENZA VIRUS VAC [ [facility]] INFLUENZA VIRUS VACCINE, HIGH DOSE SEASONAL, PRESERVATIVE FREE [72041 CPT(R)] Follow-up Instructions Return in about 3 months (around 12/19/2017). Introducing [facility]!    
 [employer] introduces [employer] patient portal. Now you can access parts of your medical record, email your doctor's office, and request medication refills online. 1. In your internet browser, go to [email or web address] 2. Click on the First Time User? Click Here link in the Sign In box. You will see the New Member Sign Up page. 3. Enter your [employer] Access Code exactly as it appears below. You will not need to use this code after youve completed the sign-up process. If you do not sign up before the expiration date, you must request a new code. · [employer] Access Code: [email or web address] Expires: 9/28/2017 10:46 AM 
 
4. Enter the last four digits of your Social Security Number (xxxx) and Date of Birth (mm/dd/yyyy) as indicated and click Submit. You will be taken to the next sign-up page. 5. Create a [employer] ID. This will be your [employer] login ID and cannot be changed, so think of one that is secure and easy to remember. 6. Create a [employer] password. You can change your password at any time. 7. Enter your Password Reset Question and Answer. This can be used at a later time if you forget your password. 8. Enter your e-mail address. You will receive e-mail notification when new information is available in [address] 9. Click Sign Up. You can now view and download portions of your medical record. 10. Click the Download Summary menu link to download a portable copy of your medical information. If you have questions, please visit the Frequently Asked Questions section of the [employer] website. Remember, [employer] is NOT to be used for urgent needs. For medical emergencies, dial 911. Now available from your iPhone and Android! Please provide this summary of care documentation to your next provider. Your primary care clinician is listed as Olanta Christians. If you have any questions after today's visit, please call [phone].

## 2017-09-19 NOTE — PROGRESS NOTES
HISTORY OF PRESENT ILLNESS  Delfina John is a 78 y.o. female. HPI   aodm stable  hbp stable  ra under rheum  Review of Systems   Musculoskeletal: Positive for joint pain. All other systems reviewed and are negative. Past Medical History:   Diagnosis Date    Asthma     Diabetes ([address]     Hypertension     Vaginitis due to Candida albicans 4/3/2014       Current Outpatient Prescriptions:     diclofenac EC (VOLTAREN) 75 mg EC tablet, Take  by mouth., Disp: , Rfl:     hydroxychloroquine (PLAQUENIL) 200 mg tablet, Take 200 mg by mouth daily. , Disp: , Rfl:     VENTOLIN HFA 90 mcg/actuation inhaler, INHALE TWO PUFFS BY MOUTH EVERY 6 HOURS AS NEEDED FOR  WHEEZING, Disp: 1 Inhaler, Rfl: 11    OTHER, , Disp: , Rfl:     ACCU-CHEK SMARTVIEW TEST STRIP strip, USE ONE STRIP TO CHECK GLUCOSE TWICE DAILY AND AS NEEDED, Disp: 150 Strip, Rfl: 0    ipratropium (ATROVENT) 0.02 % nebulizer solution, INHALE 2.5ML VIA NEBULIZER FOUR TIMES DAILY AS NEEDED FOR WHEEZING., Disp: 150 mL, Rfl: 0    cloNIDine HCl (CATAPRES) 0.1 mg tablet, 1 in AM, 2 in PM, Disp: 270 Tab, Rfl: 3    fluticasone-salmeterol (ADVAIR DISKUS) 500-50 mcg/dose diskus inhaler, Take 1 Puff by inhalation every twelve (12) hours. , Disp: 3 Inhaler, Rfl: 3    losartan (COZAAR) 100 mg tablet, Take 1 Tab by mouth daily. , Disp: 90 Tab, Rfl: 3    albuterol sulfate (PROVENTIL;VENTOLIN) 2.5 mg/0.5 mL nebu nebulizer solution, 0.5 mL by Nebulization route every four (4) hours as needed for Wheezing., Disp: 150 mL, Rfl: 3    albuterol (ACCUNEB) 1.25 mg/3 mL nebu, 3 mL by Nebulization route every four (4) hours as needed. , Disp: 540 mL, Rfl: 3    metFORMIN (GLUCOPHAGE) 1,000 mg tablet, Take 1 Tab by mouth two (2) times daily (with meals). , Disp: 180 Tab, Rfl: 3    nystatin-triamcinolone (MYCOLOG II) topical cream, Apply  to affected area two (2) times a day., Disp: 30 g, Rfl: 0    glucose blood VI test strips (ACCU-CHEK COMFORT CURVE TEST) strip, Test blood sugar bid and prn for diagnosis 250.00, Disp: 100 Strip, Rfl: 3    albuterol sulfate SR (PROVENTIL SR) 4 mg ER tablet, Take 1 Tab by mouth every twelve (12) hours. , Disp: 60 Tab, Rfl: 3    mupirocin calcium (BACTROBAN) 2 % topical cream, Apply  to affected area two (2) times a day., Disp: 30 g, Rfl: 0    glipiZIDE (GLUCOTROL) 5 mg tablet, Take 1 tablet by mouth two (2) times a day., Disp: 180 tablet, Rfl: 0    magnesium 250 mg tab, Take  by mouth., Disp: , Rfl:     calcium-cholecalciferol, d3, (CALCIUM 600 + D) 600-125 mg-unit tab, Take  by mouth., Disp: , Rfl:     cholecalciferol (VITAMIN D3) 1,000 unit tablet, Take  by mouth daily. , Disp: , Rfl:     nystatin (MYCOSTATIN) powder, Apply daily after bathing, Disp: 60 g, Rfl: 3    predniSONE (DELTASONE) 20 mg tablet, Take 1 Tab by mouth two (2) times a day., Disp: 60 Tab, Rfl: 0    acetaminophen-codeine (TYLENOL-CODEINE #3) 300-30 mg per tablet, Take 1 Tab by mouth every six (6) hours as needed for Pain. Max Daily Amount: 4 Tabs., Disp: 30 Tab, Rfl: 0    traMADol (ULTRAM) 50 mg tablet, Take 1 Tab by mouth every eight (8) hours as needed for Pain. Max Daily Amount: 150 mg., Disp: 40 Tab, Rfl: 0    tiZANidine (ZANAFLEX) 2 mg tablet, Take 1 Tab by mouth three (3) times daily as needed. , Disp: 90 Tab, Rfl: 1    tiZANidine (ZANAFLEX) 2 mg tablet, Take 1 Tab by mouth three (3) times daily as needed for Pain., Disp: 60 Tab, Rfl: 3    linagliptin (TRADJENTA) 5 mg tablet, Take 1 Tab by mouth daily. , Disp: 30 Tab, Rfl: 2    meclizine (ANTIVERT) 25 mg tablet, Take 1 Tab by mouth three (3) times daily as needed for Dizziness. , Disp: 90 Tab, Rfl: 1    meloxicam (MOBIC) 15 mg tablet, Take 1 Tab by mouth daily. , Disp: 90 Tab, Rfl: 3    omeprazole (PRILOSEC) 20 mg capsule, Take 1 capsule by mouth daily. , Disp: 60 capsule, Rfl: 1    omeprazole (PRILOSEC) 40 mg capsule, Take 1 Cap by mouth daily. , Disp: 90 Cap, Rfl: 3    tiZANidine (ZANAFLEX) 2 mg tablet, Take 1 Tab by mouth three (3) times daily as needed. , Disp: 60 Tab, Rfl: 0    OTHER, accuchek comfort curve monitor kit, Disp: 1 Device, Rfl: 0  Visit Vitals    /84 (BP 1 Location: Right arm, BP Patient Position: Sitting)    Pulse 90    Temp 98.2 °F (36.8 °C) (Oral)    Resp 18    Ht 5' 2\" (1.575 m)    Wt 156 lb (70.8 kg)    SpO2 100%    BMI 28.53 kg/m2         Physical Exam   Constitutional: She appears well-developed and well-nourished. No distress. Musculoskeletal: She exhibits edema and tenderness. She exhibits no deformity. Decreased rom hands   Skin: She is not diaphoretic. Vitals reviewed.   A1C 8.0  ASSESSMENT and PLAN  aodm   hbp  ra  Plan  Make glipizide 1 bid  Start actos 15 mg  Recheck in 3 month

## 2017-09-19 NOTE — TELEPHONE ENCOUNTER
Pharmacy called asking how many times per day the pt is to be testing with her lancets. Also, the lancets come in boxes of #102. Pharmacy is requesting a new Rx be sent in written as #102 with how many times per day.

## 2017-09-19 NOTE — PROGRESS NOTES
Chief Complaint   Patient presents with    Diabetes    Hypertension    Asthma    Shoulder Pain     pain scale 4/10     1. Have you been to the ER, urgent care clinic since your last visit? Hospitalized since your last visit? No    2. Have you seen or consulted any other health care providers outside of the [address] [place] [address] since your last visit? Include any pap smears or colon screening.  Yes Where: [facility]

## 2017-09-20 ENCOUNTER — TELEPHONE (OUTPATIENT)
Dept: FAMILY MEDICINE CLINIC | Age: 67
End: 2017-09-20

## 2017-09-20 NOTE — TELEPHONE ENCOUNTER
Pharmacy called patient would like to change the Lancet order to  Accucheck fast click and change the directions to  twice daily

## 2017-10-18 ENCOUNTER — HOSPITAL ENCOUNTER (OUTPATIENT)
Dept: PHYSICAL THERAPY | Age: 67
Discharge: HOME | End: 2017-10-18
Payer: MEDICARE

## 2017-10-18 PROCEDURE — G8979 MOBILITY GOAL STATUS: HCPCS

## 2017-10-18 PROCEDURE — G8978 MOBILITY CURRENT STATUS: HCPCS

## 2017-10-18 PROCEDURE — 97162 PT EVAL MOD COMPLEX 30 MIN: CPT

## 2017-10-18 PROCEDURE — 97110 THERAPEUTIC EXERCISES: CPT

## 2017-10-18 NOTE — PROGRESS NOTES
[address] [place] [address], 70 [facility] - Phone: [phone]  Fax: [phone] OF CARE / [address] [facility]  Patient Name: Lon Mayorga : 1950   Medical   Diagnosis: Low back pain [M54.5] Treatment Diagnosis: Low back pain [M54.5]   Onset Date: 2017     Referral Source: Vijay Perez MD Start of Care [facility]): 10/18/2017   Prior Hospitalization: See medical history Provider #: [id number]   Prior Level of Function: No limitations prior to onset; retired. Comorbidities: L foot reconstruction , Meniere's dz   Medications: Verified on Patient Summary List   The Plan of Care and following information is based on the information from the initial evaluation.   ===========================================================================================  Assessment / key information:  Pt is a 75y.o. year old female with subjective complaints of LBP (states she has had back pain for a couple of years) and acute Right posterior thigh pain of insidious onset. Pt states she has had xrays 16 (+) for Dextroscoliosis with advanced degenerative changes. Current pain is rated as 7 to 9/10; localized to posterior right thigh to knee with c/o intermittent numbness. Denies red flags. Pain improved with sitting, worsens with activities. Current functional limitations: household chores, standing tolerance ~ 5 min, walking tolerance~ 30 min, stairs. FOTO score= 31/100 indicating significant impairment to functional activities. Today's evaulation is significant for 1) postural impairments: Scoliotic curvature R T5 with decreased kyphosis, left T10.   2) Impaired lumbar AROM grossly 75% throughout with end range pain c/o into all motions except extension and R ROT.   3) Impaired strength: bilateral quads= 4+, bilateral hip ext= 4+, Hip flex (R) 3+, (L) 4-.  Hip abd (R) 3+, (L) 4-.  Hams= 4-. 4) Positive special tests: SLR Right (negative dural response). JANINE Right. Repeated movement testing reveals no conclusive directional preference. Pt will be a good candidate for skilled PT to address these impairments and promote return to normal ADLs and functional mobility for improved quality of life.    ===========================================================================================  Eval Complexity: History MEDIUM  Complexity : 1-2 comorbidities / personal factors will impact the outcome/ POC ;  Examination  MEDIUM Complexity : 3 Standardized tests and measures addressing body structure, function, activity limitation and / or participation in recreation ; Presentation MEDIUM Complexity : Evolving with changing characteristics ; Decision Making MEDIUM Complexity : FOTO score of 26-74; Overall Complexity MEDIUM  Problem List: pain affecting function, decrease ROM, decrease strength, impaired gait/ balance, decrease ADL/ functional abilitiies, decrease activity tolerance, decrease flexibility/ joint mobility and decrease transfer abilities   Treatment Plan may include any combination of the following: Therapeutic exercise, Therapeutic activities, Neuromuscular re-education, Physical agent/modality, Gait/balance training, Manual therapy, Patient education and Functional mobility training  Patient / Family readiness to learn indicated by: asking questions, trying to perform skills and interest  Persons(s) to be included in education: patient (P)  Barriers to Learning/Limitations: None  Measures taken:    Patient Goal (s): \"less pain\"   Patient self reported health status: poor  Rehabilitation Potential: fair   Short Term Goals: To be accomplished in  2  weeks:  1. Pt will be educated in appropriate HEP to decrease pain, increase ROM/strength and return pt to PLOF. 2. Pt will be educated on sitting posture modification to reduce musculoskeletal strain with sitting ADL's.  Long Term Goals: To be accomplished in  3-4  weeks:  1. Pt will improve FOTO score by >/= 10% to demo a significant improvement in functional activity tolerance. 2. Pt will achieve >/= +3 GROC in order to promote increased activity tolerance. 3. Pt will report at least 50% improvement in frequency/intensity of right LE radicular symptoms with ADL's.   4. Patient will report max pain </= 5/10 with normal activities to promote increased activity tolerance. Frequency / Duration:   Patient to be seen  1  times per week for 3-4  weeks:  Patient / Caregiver education and instruction: activity modification and exercises  G-Codes (GP): Mobility [id number] Current  CL= 60-79%   Goal  CK= 40-59%. The severity rating is based on the FOTO Score  Therapist Signature: James Reagan. Luis PT Date: 63/90/2165   Certification Period: 10/18/2017 to 1/16/2018 Time: 9:27 AM   ===========================================================================================  I certify that the above Physical Therapy Services are being furnished while the patient is under my care. I agree with the treatment plan and certify that this therapy is necessary. Physician Signature:        Date:       Time:     Please sign and return to [facility] at [facility], [facility]. or you may fax the signed copy to [phone]. Thank you.

## 2017-10-18 NOTE — PROGRESS NOTES
PHYSICAL THERAPY - DAILY TREATMENT NOTE    Patient Name: Peng Ruelas        Date: 10/18/2017  : 1950   yes Patient  Verified  Visit #:   1   of   8-10  Insurance: Payor: Annette Simmonds / Plan: [facility] HUMANA MEDICARE CHOICE PPO/PFFS / Product Type: Managed Care Medicare /      In time: 100 Out time: 210   Total Treatment Time: 70     Medicare Time Tracking (below)   Total Timed Codes (min):  60 1:1 Treatment Time:  60     TREATMENT AREA =  Low back pain [M54.5]    SUBJECTIVE  Pain Level (on 0 to 10 scale):  9  / 10   Medication Changes/New allergies or changes in medical history, any new surgeries or procedures?    no  If yes, update Summary List   Subjective Functional Status/Changes:  []  No changes reported     See POC          OBJECTIVE      See exam on chart for details on objective findings    Modalities Rationale:     decrease pain to improve patient's ability to perform functional mobility/ADLs and attain goals. min [] Estim, type/location:                                      []  att     []  unatt     []  w/US     []  w/ice    []  w/heat    min []  Mechanical Traction: type/lbs                   []  pro   []  sup   []  int   []  cont    []  before manual    []  after manual    min []  Ultrasound, settings/location:      min []  Iontophoresis w/ dexamethasone, location:                                               []  take home patch       []  in clinic   10 min [x]  Ice     []  Heat    location/position: Reclined to L/S    min []  Vasopneumatic Device, press/temp:     min []  Other:    [] Skin assessment post-treatment (if applicable):    []  intact    []  redness- no adverse reaction     []redness - adverse reaction:        10 min Therapeutic Exercise:  [x]  See flow sheet and pt ed below   Rationale:      increase ROM and increase strength to improve the patients ability to perform functional mobility/ADLs and attain goals.           min Patient Education:  yes  Review HEP, handout created and issued to pt. as per chart. Pt educated in spinal anatomy, function and dysfunction as related to diagnosis. Instructed in correct execution of engaging core. Pt ed on use of ice PRN for pain management   []  Progressed/Changed HEP based on: Other Objective/Functional Measures:    See POC     Post Treatment Pain Level (on 0 to 10) scale:   7  / 10     ASSESSMENT  Assessment/Changes in Function:     See POC     []  See Progress Note/Recertification   Patient will continue to benefit from skilled PT services to modify and progress therapeutic interventions, address functional mobility deficits, address ROM deficits, address strength deficits and analyze and address soft tissue restrictions to attain remaining goals. Progress toward goals / Updated goals:    See POC     PLAN  []  Upgrade activities as tolerated yes Continue plan of care   []  Discharge due to :    []  Other:      Therapist: Lia Donaldson. Wallace Mullen, PT    Date: 10/18/2017 Time: 9:27 AM     Future Appointments  Date Time Provider Chaparrita Kuhn   10/18/2017 1:00 PM Lauren D. Dagmar Carrel [facility]   12/18/2017 1:00 PM Kalyani Lorenzo MD [facility]

## 2017-10-20 RX ORDER — BLOOD SUGAR DIAGNOSTIC
STRIP MISCELLANEOUS
Qty: 150 STRIP | Refills: 0 | Status: SHIPPED | OUTPATIENT
Start: 2017-10-20 | End: 2017-11-27 | Stop reason: SDUPTHER

## 2017-10-25 ENCOUNTER — HOSPITAL ENCOUNTER (OUTPATIENT)
Dept: PHYSICAL THERAPY | Age: 67
Discharge: HOME | End: 2017-10-25
Payer: MEDICARE

## 2017-10-25 PROCEDURE — 97110 THERAPEUTIC EXERCISES: CPT

## 2017-10-25 PROCEDURE — 97140 MANUAL THERAPY 1/> REGIONS: CPT

## 2017-10-25 NOTE — PROGRESS NOTES
PHYSICAL THERAPY - DAILY TREATMENT NOTE    Patient Name: Alyssia Shaw        Date: 10/25/2017  : 1950   yes Patient  Verified  Visit #:   2   of   3  Insurance: Payor: Glendy Richardson / Plan: [facility] HUMANA MEDICARE CHOICE PPO/PFFS / Product Type: Managed Care Medicare /      In time: 132 Out time: 225   Total Treatment Time: 48     Medicare Time Tracking (below)   Total Timed Codes (min):  43 1:1 Treatment Time:  30     TREATMENT AREA =  Low back pain [M54.5]    SUBJECTIVE  Pain Level (on 0 to 10 scale):  4  / 10   Medication Changes/New allergies or changes in medical history, any new surgeries or procedures?    no  If yes, update Summary List   Subjective Functional Status/Changes:  []  No changes reported     \"much better now. I get up better from the recliner. I can sleep better. I have less intense pain to the back of my leg. OBJECTIVE  Modalities Rationale:     decrease pain to improve patient's ability to perform functional mobility/ADLs and attain goals. min [] Estim, type/location:                                      []  att     []  unatt     []  w/US     []  w/ice    []  w/heat    min []  Mechanical Traction: type/lbs                   []  pro   []  sup   []  int   []  cont    []  before manual    []  after manual    min []  Ultrasound, settings/location:      min []  Iontophoresis w/ dexamethasone, location:                                               []  take home patch       []  in clinic   10 min [x]  Ice     []  Heat    location/position: Reclined with LE wedge, to Lumbar    min []  Vasopneumatic Device, press/temp:     min []  Other:    [] Skin assessment post-treatment (if applicable):    []  intact    []  redness- no adverse reaction     []redness - adverse reaction:        23  (33) min Therapeutic Exercise:  [x]  See flow sheet   Rationale:      increase ROM and increase strength to improve the patients ability to perform functional mobility/ADLs and attain goals. 10 min Manual Therapy: STM/DTM to bilateral lumbo-sacral paraspinals, Right Piriformis. MFR right piriformis. Grade 3 P/A mobs L3-L5   Rationale:      decrease pain, increase ROM, increase tissue extensibility and decrease trigger points to improve patient's ability to perform functional mobility/ADLs and attain goals. min Patient Education:  yes  Reviewed HEP   []  Progressed/Changed HEP based on: Other Objective/Functional Measures:    -new exercises added as per flow sheet with vc's provided for form/technique 100% of the time.   -1:1 cues for transversus abdominus training and use of breathe to avoid breath holding (poor/fair form). Post Treatment Pain Level (on 0 to 10) scale:   1-2  / 10     ASSESSMENT  Assessment/Changes in Function:     Pt primary pain c/o to right posterior thigh. She did c/o \"burning/tight\" to hamstring with sciatic n. Glides and cued to stay below pain level. No specific directional preference appreciated. Will plan to progress with core strengthening HEP with goal for d/c at next session. []  See Progress Note/Recertification   Patient will continue to benefit from skilled PT services to modify and progress therapeutic interventions, address functional mobility deficits, address ROM deficits, address strength deficits and analyze and address soft tissue restrictions to attain remaining goals. Progress toward goals / Updated goals: · Short Term Goals: To be accomplished in  2  weeks:  1. Pt will be educated in appropriate HEP to decrease pain, increase ROM/strength and return pt to PLOF.  -10/25: Goal progressing  2. Pt will be educated on sitting posture modification to reduce musculoskeletal strain with sitting ADL's.  -10/25: Goal met; discussed use of towel lumbar roll.     · Long Term Goals: To be accomplished in  3-4  weeks:  1. Pt will improve FOTO score by >/= 10% to demo a significant improvement in functional activity tolerance.   2. Pt will achieve >/= +3 GROC in order to promote increased activity tolerance. 3. Pt will report at least 50% improvement in frequency/intensity of right LE radicular symptoms with ADL's. -10/25: Goal progressing  4. Patient will report max pain </= 5/10 with normal activities to promote increased activity tolerance.-10/25: Goal progressing     PLAN  []  Upgrade activities as tolerated yes Continue plan of care   []  Discharge due to :    []  Other:      Therapist: Maday No. Dana Elena, PT    Date: 10/25/2017 Time: 1:46 PM     Future Appointments  Date Time Provider Chaparrita Kuhn   10/31/2017 8:30 AM Bob Cyr [facility]   11/7/2017 1:30 PM Lauren D. Marylen Drummer [facility]   12/18/2017 1:00 PM Avril Richards MD [facility]

## 2017-10-31 ENCOUNTER — APPOINTMENT (OUTPATIENT)
Dept: PHYSICAL THERAPY | Age: 67
End: 2017-10-31
Payer: MEDICARE

## 2017-11-07 ENCOUNTER — HOSPITAL ENCOUNTER (OUTPATIENT)
Dept: PHYSICAL THERAPY | Age: 67
Discharge: HOME | End: 2017-11-07
Payer: MEDICARE

## 2017-11-07 PROCEDURE — 97110 THERAPEUTIC EXERCISES: CPT

## 2017-11-07 PROCEDURE — 97140 MANUAL THERAPY 1/> REGIONS: CPT

## 2017-11-07 NOTE — PROGRESS NOTES
PHYSICAL THERAPY - DAILY TREATMENT NOTE    Patient Name: María Alfaro        Date: 2017  : 1950   yes Patient  Verified  Visit #:   3   of   3-4  Insurance: Payor: Dave Anglin / Plan: [facility] HUMANA MEDICARE CHOICE PPO/PFFS / Product Type: Managed Care Medicare /      In time: 128 Out time: 210   Total Treatment Time: 42     Medicare Time Tracking (below)   Total Timed Codes (min):  32 1:1 Treatment Time:  30     TREATMENT AREA =  Low back pain [M54.5]    SUBJECTIVE  Pain Level (on 0 to 10 scale):  6  / 10   Medication Changes/New allergies or changes in medical history, any new surgeries or procedures?    no  If yes, update Summary List   Subjective Functional Status/Changes:  []  No changes reported     C/o soreness which she relates to rainy weather. Pt states she has been feeling better in general though sometimes she has increased R LE pain after HEP (candis Gonzales). Average pain= 4/10         OBJECTIVE  Modalities Rationale:     decrease pain to improve patient's ability to perform functional mobility/ADLs and attain goals.    min [] Estim, type/location:                                      []  att     []  unatt     []  w/US     []  w/ice    []  w/heat    min []  Mechanical Traction: type/lbs                   []  pro   []  sup   []  int   []  cont    []  before manual    []  after manual    min []  Ultrasound, settings/location:      min []  Iontophoresis w/ dexamethasone, location:                                               []  take home patch       []  in clinic   10 min [x]  Ice     []  Heat    location/position: Reclined with LE wedge, to Lumbar    min []  Vasopneumatic Device, press/temp:     min []  Other:    [] Skin assessment post-treatment (if applicable):    []  intact    []  redness- no adverse reaction     []redness - adverse reaction:        20  (22) min Therapeutic Exercise:  [x]  See flow sheet   Rationale:      increase ROM and increase strength to improve the patients ability to perform functional mobility/ADLs and attain goals. 10 min Manual Therapy: STM/DTM to bilateral lumbo-sacral paraspinals, Right Piriformis. MFR right piriformis. Grade 3 P/A mobs L3-L5   Rationale:      decrease pain, increase ROM, increase tissue extensibility and decrease trigger points to improve patient's ability to perform functional mobility/ADLs and attain goals. min Patient Education:  yes  Reviewed HEP   []  Progressed/Changed HEP based on: Other Objective/Functional Measures:    -pt ed to d/c candis Gonzales  -added SLR extension with TA    -performs BORIS and wall l/s extension and pt ed to perform 1 x/hr during the day. Post Treatment Pain Level (on 0 to 10) scale:   3-4  / 10     ASSESSMENT  Assessment/Changes in Function:     Pt unable to progress to prone press ups elbows extended due to c/o b/l shoulder pain. Progressing with Pat extension program with addition of SLR extension which pt tolerates with no report of increased sx's.     []  See Progress Note/Recertification   Patient will continue to benefit from skilled PT services to modify and progress therapeutic interventions, address functional mobility deficits, address ROM deficits, address strength deficits and analyze and address soft tissue restrictions to attain remaining goals. Progress toward goals / Updated goals: · Short Term Goals: To be accomplished in  2  weeks:  1. Pt will be educated in appropriate HEP to decrease pain, increase ROM/strength and return pt to PLOF.  -10/25: Goal progressing  2. Pt will be educated on sitting posture modification to reduce musculoskeletal strain with sitting ADL's.  -10/25: Goal met; discussed use of towel lumbar roll.     · Long Term Goals: To be accomplished in  3-4  weeks:  1. Pt will improve FOTO score by >/= 10% to demo a significant improvement in functional activity tolerance.   2. Pt will achieve >/= +3 GROC in order to promote increased activity tolerance. 3. Pt will report at least 50% improvement in frequency/intensity of right LE radicular symptoms with ADL's. -11/7: Goal Met: 75-80%  4. Patient will report max pain </= 5/10 with normal activities to promote increased activity tolerance.-10/25: Goal progressing; 8-9/10 after HEP lolis. glides     PLAN  []  Upgrade activities as tolerated yes Continue plan of care   []  Discharge due to :    []  Other:      Therapist: Isac Mane. Renae Alarcon, PT    Date: 11/7/2017 Time: 1:46 PM     Future Appointments  Date Time Provider Chaparrita Kuhn   11/7/2017 1:30 PM Nel Alarcon, [facility]   11/16/2017 2:00 PM Nel Jesus [facility]   12/18/2017 1:00 PM Shane Rodas MD [facility]

## 2017-11-16 ENCOUNTER — HOSPITAL ENCOUNTER (OUTPATIENT)
Dept: PHYSICAL THERAPY | Age: 67
Discharge: HOME | End: 2017-11-16
Payer: MEDICARE

## 2017-11-16 PROCEDURE — G8980 MOBILITY D/C STATUS: HCPCS

## 2017-11-16 PROCEDURE — G8979 MOBILITY GOAL STATUS: HCPCS

## 2017-11-16 PROCEDURE — 97140 MANUAL THERAPY 1/> REGIONS: CPT

## 2017-11-16 PROCEDURE — 97110 THERAPEUTIC EXERCISES: CPT

## 2017-11-16 NOTE — PROGRESS NOTES
PHYSICAL THERAPY - DAILY TREATMENT NOTE    Patient Name: Gretchen Slade        Date: 2017  : 1950   yes Patient  Verified  Visit #:   4   of   3-4  Insurance: Payor: Etahn Jassonarciso / Plan: [facility] HUMANA MEDICARE CHOICE PPO/PFFS / Product Type: Managed Care Medicare /      In time: 203 Out time: 253   Total Treatment Time: 50     Medicare Time Tracking (below)   Total Timed Codes (min):  40 1:1 Treatment Time:  29     TREATMENT AREA =  Low back pain [M54.5]    SUBJECTIVE  Pain Level (on 0 to 10 scale):  2-3  / 10   Medication Changes/New allergies or changes in medical history, any new surgeries or procedures?    no  If yes, update Summary List   Subjective Functional Status/Changes:  []  No changes reported     See d/c summary          OBJECTIVE  Modalities Rationale:     decrease pain to improve patient's ability to perform functional mobility/ADLs and attain goals. min [] Estim, type/location:                                      []  att     []  unatt     []  w/US     []  w/ice    []  w/heat    min []  Mechanical Traction: type/lbs                   []  pro   []  sup   []  int   []  cont    []  before manual    []  after manual    min []  Ultrasound, settings/location:      min []  Iontophoresis w/ dexamethasone, location:                                               []  take home patch       []  in clinic   10 min [x]  Ice     []  Heat    location/position: Reclined with LE wedge     min []  Vasopneumatic Device, press/temp:     min []  Other:    [] Skin assessment post-treatment (if applicable):    []  intact    []  redness- no adverse reaction     []redness - adverse reaction:        19  (30) min Therapeutic Exercise:  [x]  See flow sheet   Rationale:      increase ROM and increase strength to improve the patients ability to perform functional mobility/ADLs and attain goals. 10 min Manual Therapy: STM/DTM to bilateral lumbo-sacral paraspinals, Right Piriformis. MFR right piriformis. Grade 3 P/A mobs L3-L5   Rationale:      decrease pain, increase ROM and increase tissue extensibility to improve patient's ability to perform functional mobility/ADLs and attain goals. min Patient Education:  yes  Reviewed HEP   []  Progressed/Changed HEP based on: Other Objective/Functional Measures:    See d/c summary     Post Treatment Pain Level (on 0 to 10) scale:   2  / 10     ASSESSMENT  Assessment/Changes in Function:     See d/c summary     []  See Progress Note/Recertification      Progress toward goals / Updated goals:    See d/c summary     PLAN  []  Upgrade activities as tolerated no Continue plan of care   [x]  Discharge due to :    []  Other:      Therapist: Twila Minor. Gloria Garcia, PT    Date: 11/16/2017 Time: 9:21 AM     Future Appointments  Date Time Provider Chaparrita Kuhn   11/16/2017 2:00 PM Nel FIGUEROA [facility]   12/18/2017 1:00 PM Clayton Carlos MD [facility]

## 2017-11-16 NOTE — PROGRESS NOTES
[address] [place] [address], [place] [address], 70 [facility] - Phone: [phone]  Fax: [phone] FOR PHYSICAL THERAPY          Patient Name: Marcus Severin : 1950   Treatment/Medical Diagnosis: Low back pain [M54.5]   Onset Date: 2017    Referral Source: Jimenez Wilks MD [facility]): 10/18/2017   Prior Hospitalization: See Medical History Provider #: [id number]   Prior Level of Function: No limitations prior to onset; retired   Comorbidities: L foot reconstruction , Meniere's dz   Medications: Verified on Patient Summary List   Visits from [facility]: 4 Missed Visits: 1     Goal/Measure of Progress Goal Met? 1. Pt will improve FOTO score by >/= 10% to demo a significant improvement in functional activity tolerance. Status at last Eval: 31 Current Status: 58 yes   2. Pt will achieve >/= +3 GROC in order to promote increased activity tolerance. Status at last Eval: n/a Current Status: +6 yes   3. Pt will report at least 50% improvement in frequency/intensity of right LE radicular symptoms with ADL's. Status at last Eval:  Current Status: 90% (less frequent and intense) yes     4. Patient will report max pain </= 5/10 with normal activities to promote increased activity tolerance. Status at last Eval: 9 Current Status: 6/10 In progress     Key Functional Changes/Progress: Pt notes improved activity tolerance; \"I was able to sweep and mop the whole house without a rest break like I usually have to do\". Rates overall improvement as 80-90% with functional activities. Average pain= 4-5/10. Least pain= 1-2. Max pain= 6/10. Pt has been compliant with HEP for self management of LBP.   Reduced frequency/intensity of RLE radicular sx's indicate good progress and pt has been educated on need to continue with HEP for complete resolution of sx's with instructions provided for gradual taper when cessation of radicular sx's achieved. G-Codes (GP): Mobility  [id number] Goal  CK= 40-59% [id number] D/C  CK= 40-59%. The severity rating is based on the FOTO Score    Assessments/Recommendations: Discontinue therapy. Progressing towards or have reached established goals. If you have any questions/comments please contact us directly at [phone]. Thank you for allowing us to assist in the care of your patient. Therapist Signature: Radha Hanley.  GOPI Smith Date: 11/16/17   Reporting Period: 10/18/2017 to 11/16/17 Time: 9:22 AM

## 2017-11-20 RX ORDER — FLUTICASONE PROPIONATE AND SALMETEROL 50; 500 UG/1; UG/1
POWDER RESPIRATORY (INHALATION)
Qty: 60 EACH | Refills: 3 | Status: SHIPPED | OUTPATIENT
Start: 2017-11-20 | End: 2018-01-15 | Stop reason: SDUPTHER

## 2017-11-27 NOTE — TELEPHONE ENCOUNTER
Pt called to request a refill on her test strips. I saw two on her chart accu-check smartview test strips and also accu-check comfort curve test strip. She was unsure which one it was she just needs the one that plugs into the machine with her blood. She is running low. Please advise.

## 2017-12-08 ENCOUNTER — TELEPHONE (OUTPATIENT)
Dept: FAMILY MEDICINE CLINIC | Age: 67
End: 2017-12-08

## 2017-12-08 NOTE — TELEPHONE ENCOUNTER
Pt called in regards to her test strip refill. Pharmacy is saying it's too soon to refill. But her numbers have been low so she has been checking more frequently and is about to be out. Trying to get early refill approved.

## 2017-12-18 ENCOUNTER — OFFICE VISIT (OUTPATIENT)
Dept: FAMILY MEDICINE CLINIC | Age: 67
End: 2017-12-18

## 2017-12-18 VITALS
BODY MASS INDEX: 27.23 KG/M2 | RESPIRATION RATE: 18 BRPM | WEIGHT: 148 LBS | SYSTOLIC BLOOD PRESSURE: 129 MMHG | OXYGEN SATURATION: 98 % | DIASTOLIC BLOOD PRESSURE: 62 MMHG | HEIGHT: 62 IN | HEART RATE: 100 BPM | TEMPERATURE: 97.4 F

## 2017-12-18 DIAGNOSIS — M06.09 RHEUMATOID ARTHRITIS OF MULTIPLE SITES WITH NEGATIVE RHEUMATOID FACTOR (HCC): ICD-10-CM

## 2017-12-18 DIAGNOSIS — E11.9 CONTROLLED TYPE 2 DIABETES MELLITUS WITHOUT COMPLICATION, WITHOUT LONG-TERM CURRENT USE OF INSULIN (HCC): Primary | ICD-10-CM

## 2017-12-18 DIAGNOSIS — K31.9 GASTROPATHY: ICD-10-CM

## 2017-12-18 DIAGNOSIS — J45.40 MODERATE PERSISTENT ASTHMA WITHOUT COMPLICATION: ICD-10-CM

## 2017-12-18 DIAGNOSIS — I10 ESSENTIAL HYPERTENSION, BENIGN: ICD-10-CM

## 2017-12-18 RX ORDER — METHOTREXATE 2.5 MG/1
20 TABLET ORAL
Qty: 32 TAB | Refills: 3
Start: 2017-12-22 | End: 2018-01-21

## 2017-12-18 RX ORDER — OMEPRAZOLE 40 MG/1
40 CAPSULE, DELAYED RELEASE ORAL DAILY
Qty: 90 CAP | Refills: 0 | Status: SHIPPED | OUTPATIENT
Start: 2017-12-18 | End: 2018-01-15 | Stop reason: SDUPTHER

## 2017-12-18 RX ORDER — HYDROXYCHLOROQUINE SULFATE 200 MG/1
200 TABLET, FILM COATED ORAL DAILY
Qty: 30 TAB | Refills: 3
Start: 2017-12-18 | End: 2018-04-25 | Stop reason: SDUPTHER

## 2017-12-18 RX ORDER — FOLIC ACID 1 MG/1
1 TABLET ORAL DAILY
Qty: 30 TAB | Refills: 3
Start: 2017-12-18 | End: 2018-04-25 | Stop reason: ALTCHOICE

## 2017-12-18 NOTE — PATIENT INSTRUCTIONS
Body Mass Index: Care Instructions  Your Care Instructions    Body mass index (BMI) can help you see if your weight is raising your risk for health problems. It uses a formula to compare how much you weigh with how tall you are. · A BMI lower than 18.5 is considered underweight. · A BMI between 18.5 and 24.9 is considered healthy. · A BMI between 25 and 29.9 is considered overweight. A BMI of 30 or higher is considered obese. If your BMI is in the normal range, it means that you have a lower risk for weight-related health problems. If your BMI is in the overweight or obese range, you may be at increased risk for weight-related health problems, such as high blood pressure, heart disease, stroke, arthritis or joint pain, and diabetes. If your BMI is in the underweight range, you may be at increased risk for health problems such as fatigue, lower protection (immunity) against illness, muscle loss, bone loss, hair loss, and hormone problems. BMI is just one measure of your risk for weight-related health problems. You may be at higher risk for health problems if you are not active, you eat an unhealthy diet, or you drink too much alcohol or use tobacco products. Follow-up care is a key part of your treatment and safety. Be sure to make and go to all appointments, and call your doctor if you are having problems. It's also a good idea to know your test results and keep a list of the medicines you take. How can you care for yourself at home? · Practice healthy eating habits. This includes eating plenty of fruits, vegetables, whole grains, lean protein, and low-fat dairy. · If your doctor recommends it, get more exercise. Walking is a good choice. Bit by bit, increase the amount you walk every day. Try for at least 30 minutes on most days of the week. · Do not smoke. Smoking can increase your risk for health problems. If you need help quitting, talk to your doctor about stop-smoking programs and medicines. These can increase your chances of quitting for good. · Limit alcohol to 2 drinks a day for men and 1 drink a day for women. Too much alcohol can cause health problems. If you have a BMI higher than 25  · Your doctor may do other tests to check your risk for weight-related health problems. This may include measuring the distance around your waist. A waist measurement of more than 40 inches in men or 35 inches in women can increase the risk of weight-related health problems. · Talk with your doctor about steps you can take to stay healthy or improve your health. You may need to make lifestyle changes to lose weight and stay healthy, such as changing your diet and getting regular exercise. If you have a BMI lower than 18.5  · Your doctor may do other tests to check your risk for health problems. · Talk with your doctor about steps you can take to stay healthy or improve your health. You may need to make lifestyle changes to gain or maintain weight and stay healthy, such as getting more healthy foods in your diet and doing exercises to build muscle. Where can you learn more? Go to http://tom-suhail.info/. Enter S176 in the search box to learn more about \"Body Mass Index: Care Instructions. \"  Current as of: October 13, 2016  Content Version: 11.4  © 9543-5231 Healthwise, Incorporated. Care instructions adapted under license by [employer] (which disclaims liability or warranty for this information). If you have questions about a medical condition or this instruction, always ask your healthcare professional. [address] any warranty or liability for your use of this information.

## 2017-12-18 NOTE — PROGRESS NOTES
John Storey is a 75 y.o. female (: 1950) presenting to address:    Chief Complaint   Patient presents with    Diabetes    Hypertension    Asthma     pain scale 0/10       Vitals:    17 1258   BP: 129/62   Pulse: 100   Resp: 18   Temp: 97.4 °F (36.3 °C)   TempSrc: Oral   SpO2: 98%   Weight: 148 lb (67.1 kg)   Height: 5' 2\" (1.575 m)   PainSc:   0 - No pain       Hearing/Vision:   No exam data present    Learning Assessment:     Learning Assessment 2015   PRIMARY LEARNER Patient   HIGHEST LEVEL OF EDUCATION - PRIMARY LEARNER  DID NOT GRADUATE HIGH SCHOOL   BARRIERS PRIMARY LEARNER NONE   CO-LEARNER CAREGIVER No   PRIMARY LANGUAGE ENGLISH    NEED -   LEARNER PREFERENCE PRIMARY OTHER (COMMENT)   ANSWERED BY patient   RELATIONSHIP SELF     Depression Screening:     PHQ over the last two weeks 2017   Little interest or pleasure in doing things Not at all   Feeling down, depressed or hopeless Not at all   Total Score PHQ 2 0     Fall Risk Assessment:     Fall Risk Assessment, last 12 mths 2017   Able to walk? Yes   Fall in past 12 months? No     Abuse Screening:     Abuse Screening Questionnaire 3/26/2014   Do you ever feel afraid of your partner? N   Are you in a relationship with someone who physically or mentally threatens you? N   Is it safe for you to go home? Y     Coordination of Care Questionaire:   1. Have you been to the ER, urgent care clinic since your last visit? Hospitalized since your last visit? NO    2. Have you seen or consulted any other health care providers outside of the [facility] since your last visit? Include any pap smears or colon screening. YES Dr. Beth Amador    Advanced Directive:   1. Do you have an Advanced Directive? NO    2. Would you like information on Advanced Directives?  NO

## 2017-12-18 NOTE — PROGRESS NOTES
HISTORY OF PRESENT ILLNESS  Charlie Carcamo is a 75 y.o. female. HPI  Recent dx RA  On mtx, plaquenil, voltaren  Asthma stable  aodm stable  Has experienced appetite loss with above meds  Review of Systems   Gastrointestinal: Positive for abdominal pain. Musculoskeletal: Positive for joint pain. All other systems reviewed and are negative. Past Medical History:   Diagnosis Date    Asthma     Diabetes ([address]     Hypertension     Vaginitis due to Candida albicans 4/3/2014       Current Outpatient Prescriptions:     [START ON 12/22/2017] methotrexate (RHEUMATREX) 2.5 mg tablet, Take 8 Tabs by mouth Every Friday for 30 days. , Disp: 32 Tab, Rfl: 3    hydroxychloroquine (PLAQUENIL) 200 mg tablet, Take 1 Tab by mouth daily. , Disp: 30 Tab, Rfl: 3    folic acid (FOLVITE) 1 mg tablet, Take 1 Tab by mouth daily. , Disp: 30 Tab, Rfl: 3    glucose blood VI test strips (ACCU-CHEK SMARTVIEW TEST STRIP) strip, by Does Not Apply route See Admin Instructions. Test three times daily to follow hypoglycemia, labile glucoses, Disp: 270 Strip, Rfl: 0    ADVAIR DISKUS 500-50 mcg/dose diskus inhaler, INHALE ONE PUFF BY MOUTH EVERY 12 HOURS, Disp: 60 Each, Rfl: 3    zolpidem (AMBIEN) 10 mg tablet, TAKE ONE TABLET BY MOUTH NIGHTLY AS NEEDED FOR SLEEP, Disp: 30 Tab, Rfl: 1    diclofenac EC (VOLTAREN) 75 mg EC tablet, Take  by mouth., Disp: , Rfl:     glipiZIDE (GLUCOTROL) 5 mg tablet, Take 1 Tab by mouth two (2) times a day., Disp: 180 Tab, Rfl: 0    pioglitazone (ACTOS) 15 mg tablet, Take 1 Tab by mouth daily (with dinner). , Disp: 90 Tab, Rfl: 1    Lancets (LANCETS,ULTRA THIN) misc, Test daily, Disp: 102 Each, Rfl: 3    VENTOLIN HFA 90 mcg/actuation inhaler, INHALE TWO PUFFS BY MOUTH EVERY 6 HOURS AS NEEDED FOR  WHEEZING, Disp: 1 Inhaler, Rfl: 11    OTHER, , Disp: , Rfl:     ipratropium (ATROVENT) 0.02 % nebulizer solution, INHALE 2.5ML VIA NEBULIZER FOUR TIMES DAILY AS NEEDED FOR WHEEZING., Disp: 150 mL, Rfl: 0   cloNIDine HCl (CATAPRES) 0.1 mg tablet, 1 in AM, 2 in PM, Disp: 270 Tab, Rfl: 3    losartan (COZAAR) 100 mg tablet, Take 1 Tab by mouth daily. , Disp: 90 Tab, Rfl: 3    albuterol sulfate (PROVENTIL;VENTOLIN) 2.5 mg/0.5 mL nebu nebulizer solution, 0.5 mL by Nebulization route every four (4) hours as needed for Wheezing., Disp: 150 mL, Rfl: 3    albuterol (ACCUNEB) 1.25 mg/3 mL nebu, 3 mL by Nebulization route every four (4) hours as needed. , Disp: 540 mL, Rfl: 3    metFORMIN (GLUCOPHAGE) 1,000 mg tablet, Take 1 Tab by mouth two (2) times daily (with meals). , Disp: 180 Tab, Rfl: 3    meclizine (ANTIVERT) 25 mg tablet, Take 1 Tab by mouth three (3) times daily as needed for Dizziness. , Disp: 90 Tab, Rfl: 1    glucose blood VI test strips (ACCU-CHEK COMFORT CURVE TEST) strip, Test blood sugar bid and prn for diagnosis 250.00, Disp: 100 Strip, Rfl: 3    albuterol sulfate SR (PROVENTIL SR) 4 mg ER tablet, Take 1 Tab by mouth every twelve (12) hours. , Disp: 60 Tab, Rfl: 3    glipiZIDE (GLUCOTROL) 5 mg tablet, Take 1 tablet by mouth two (2) times a day., Disp: 180 tablet, Rfl: 0    magnesium 250 mg tab, Take  by mouth., Disp: , Rfl:     calcium-cholecalciferol, d3, (CALCIUM 600 + D) 600-125 mg-unit tab, Take  by mouth., Disp: , Rfl:     cholecalciferol (VITAMIN D3) 1,000 unit tablet, Take  by mouth daily. , Disp: , Rfl:     OTHER, accuchek comfort curve monitor kit, Disp: 1 Device, Rfl: 0  Visit Vitals    /62 (BP 1 Location: Right arm, BP Patient Position: Sitting)    Pulse 100    Temp 97.4 °F (36.3 °C) (Oral)    Resp 18    Ht 5' 2\" (1.575 m)    Wt 148 lb (67.1 kg)    SpO2 98%    BMI 27.07 kg/m2         Physical Exam   Constitutional: She appears well-developed and well-nourished. No distress. Abdominal: Soft. Bowel sounds are normal. She exhibits no distension and no mass. There is tenderness. There is no rebound and no guarding. Tender epigastrium   Skin: She is not diaphoretic.    Vitals reviewed. reviewe consultant notes  A1C 5.9    ASSESSMENT and PLAN  RA   aodm  Analgesic gastropathy  Plan  Start omeprazole 40 mg daily  Hold glucatrol  Recheck in 1 month      Discussed the patient's BMI with her. The BMI follow up plan is as follows:     dietary management education, guidance, and counseling  encourage exercise  monitor weight  prescribed dietary intake    An After Visit Summary was printed and given to the patient.

## 2017-12-18 NOTE — MR AVS SNAPSHOT
Visit Information Date & Time Provider Department Dept. Phone Encounter #  
 12/18/2017  1:00 PM Rupal Treviño, [facility] [phone] [id number] Follow-up Instructions Return in about 1 month (around 1/18/2018). Follow-up and Disposition History Upcoming Health Maintenance Date Due  
 GLAUCOMA SCREENING Q2Y 7/13/2017 EYE EXAM RETINAL OR DILATED Q1 10/7/2018* FOOT EXAM Q1 3/7/2018 MICROALBUMIN Q1 3/7/2018 LIPID PANEL Q1 3/7/2018 MEDICARE YEARLY EXAM 3/8/2018 HEMOGLOBIN A1C Q6M 3/19/2018 COLONOSCOPY 10/21/2024 DTaP/Tdap/Td series (2 - Td) 3/7/2027 *Topic was postponed. The date shown is not the original due date. Allergies as of 12/18/2017  Review Complete On: 12/18/2017 By: Rupal Treviño MD  
  
 Severity Noted Reaction Type Reactions Cefdinir  04/29/2015    Rash Current Immunizations  Reviewed on 10/21/2014 Name Date Influenza High Dose Vaccine PF 9/19/2017, 11/13/2015 Influenza Vaccine 10/21/2014  2:56 PM  
  
 Not reviewed this visit You Were Diagnosed With   
  
 Codes Comments Controlled type 2 diabetes mellitus without complication, without long-term current use of insulin [facility]    -  Primary ICD-10-CM: E11.9 ICD-9-CM: 250.00 Rheumatoid arthritis of multiple sites with negative rheumatoid factor (HCC)     ICD-10-CM: M06.09 
ICD-9-CM: 714.0 Moderate persistent asthma without complication     RZV-41-SS: J45.40 ICD-9-CM: 493.90 Essential hypertension, benign     ICD-10-CM: I10 
ICD-9-CM: 401.1 Gastropathy     ICD-10-CM: K31.9 ICD-9-CM: 537.9 Vitals BP Pulse Temp Resp Height(growth percentile) Weight(growth percentile) 129/62 (BP 1 Location: Right arm, BP Patient Position: Sitting) 100 97.4 °F (36.3 °C) (Oral) 18 5' 2\" (1.575 m) 148 lb (67.1 kg) SpO2 BMI OB Status Smoking Status 98% 27.07 kg/m2 Postmenopausal Former Smoker BMI and BSA Data Body Mass Index Body Surface Area  
 27.07 kg/m 2 1.71 m 2 Preferred Pharmacy Pharmacy Name Phone [facility] [place] [facility] Your Updated Medication List  
  
   
This list is accurate as of: 12/18/17  1:33 PM.  Always use your most recent med list.  
  
  
  
  
 ADVAIR DISKUS 500-50 mcg/dose diskus inhaler Generic drug:  fluticasone-salmeterol INHALE ONE PUFF BY MOUTH EVERY 12 HOURS  
  
 * albuterol sulfate SR 4 mg ER tablet Commonly known as:  PROVENTIL SR Take 1 Tab by mouth every twelve (12) hours. * albuterol sulfate 2.5 mg/0.5 mL Nebu nebulizer solution Commonly known as:  PROVENTIL;VENTOLIN  
0.5 mL by Nebulization route every four (4) hours as needed for Wheezing. * albuterol 1.25 mg/3 mL Nebu Commonly known as:  ACCUNEB  
3 mL by Nebulization route every four (4) hours as needed. * VENTOLIN HFA 90 mcg/actuation inhaler Generic drug:  albuterol INHALE TWO PUFFS BY MOUTH EVERY 6 HOURS AS NEEDED FOR  WHEEZING  
  
 CALCIUM 600 + D 600-125 mg-unit Tab Generic drug:  calcium-cholecalciferol (d3) Take  by mouth.  
  
 cloNIDine HCl 0.1 mg tablet Commonly known as:  CATAPRES  
1 in AM, 2 in PM  
  
 diclofenac EC 75 mg EC tablet Commonly known as:  VOLTAREN Take  by mouth. folic acid 1 mg tablet Commonly known as:  Google Take 1 Tab by mouth daily. * glipiZIDE 5 mg tablet Commonly known as:  Radha Austin Take 1 tablet by mouth two (2) times a day. * glipiZIDE 5 mg tablet Commonly known as:  Radha Petri Take 1 Tab by mouth two (2) times a day. * glucose blood VI test strips strip Commonly known as:  ACCU-CHEK COMFORT CURVE TEST Test blood sugar bid and prn for diagnosis 250.00  
  
 * glucose blood VI test strips strip Commonly known as:  ACCU-CHEK SMARTVIEW TEST STRIP  
by Does Not Apply route See Admin Instructions.  Test three times daily to follow hypoglycemia, labile glucoses  
  
 hydroxychloroquine 200 mg tablet Commonly known as:  PLAQUENIL Take 1 Tab by mouth daily. ipratropium 0.02 % Soln Commonly known as:  ATROVENT  
INHALE 2.5ML VIA NEBULIZER FOUR TIMES DAILY AS NEEDED FOR WHEEZING. Lancets Misc Commonly known as:  Tali Copa Test daily  
  
 losartan 100 mg tablet Commonly known as:  COZAAR Take 1 Tab by mouth daily. magnesium 250 mg Tab Take  by mouth.  
  
 meclizine 25 mg tablet Commonly known as:  ANTIVERT Take 1 Tab by mouth three (3) times daily as needed for Dizziness. metFORMIN 1,000 mg tablet Commonly known as:  GLUCOPHAGE Take 1 Tab by mouth two (2) times daily (with meals). methotrexate 2.5 mg tablet Commonly known as:  Skip Tania Take 8 Tabs by mouth Every Friday for 30 days. Start taking on:  12/22/2017  
  
 omeprazole 40 mg capsule Commonly known as:  PRILOSEC Take 1 Cap by mouth daily. * OTHER  
  
 * OTHER  
accuchek comfort curve monitor kit  
  
 pioglitazone 15 mg tablet Commonly known as:  ACTOS Take 1 Tab by mouth daily (with dinner). VITAMIN D3 1,000 unit tablet Generic drug:  cholecalciferol Take  by mouth daily. zolpidem 10 mg tablet Commonly known as:  AMBIEN  
TAKE ONE TABLET BY MOUTH NIGHTLY AS NEEDED FOR SLEEP * Notice: This list has 10 medication(s) that are the same as other medications prescribed for you. Read the directions carefully, and ask your doctor or other care provider to review them with you. Prescriptions Sent to Pharmacy Refills  
 omeprazole (PRILOSEC) 40 mg capsule 0 Sig: Take 1 Cap by mouth daily. Class: Normal  
 Pharmacy: [facility] [place] #: [phone] Route: Oral  
  
Follow-up Instructions Return in about 1 month (around 1/18/2018). Patient Instructions Body Mass Index: Care Instructions Your Care Instructions Body mass index (BMI) can help you see if your weight is raising your risk for health problems. It uses a formula to compare how much you weigh with how tall you are. · A BMI lower than 18.5 is considered underweight. · A BMI between 18.5 and 24.9 is considered healthy. · A BMI between 25 and 29.9 is considered overweight. A BMI of 30 or higher is considered obese. If your BMI is in the normal range, it means that you have a lower risk for weight-related health problems. If your BMI is in the overweight or obese range, you may be at increased risk for weight-related health problems, such as high blood pressure, heart disease, stroke, arthritis or joint pain, and diabetes. If your BMI is in the underweight range, you may be at increased risk for health problems such as fatigue, lower protection (immunity) against illness, muscle loss, bone loss, hair loss, and hormone problems. BMI is just one measure of your risk for weight-related health problems. You may be at higher risk for health problems if you are not active, you eat an unhealthy diet, or you drink too much alcohol or use tobacco products. Follow-up care is a key part of your treatment and safety. Be sure to make and go to all appointments, and call your doctor if you are having problems. It's also a good idea to know your test results and keep a list of the medicines you take. How can you care for yourself at home? · Practice healthy eating habits. This includes eating plenty of fruits, vegetables, whole grains, lean protein, and low-fat dairy. · If your doctor recommends it, get more exercise. Walking is a good choice. Bit by bit, increase the amount you walk every day. Try for at least 30 minutes on most days of the week. · Do not smoke. Smoking can increase your risk for health problems.  If you need help quitting, talk to your doctor about stop-smoking programs and medicines. These can increase your chances of quitting for good. · Limit alcohol to 2 drinks a day for men and 1 drink a day for women. Too much alcohol can cause health problems. If you have a BMI higher than 25 · Your doctor may do other tests to check your risk for weight-related health problems. This may include measuring the distance around your waist. A waist measurement of more than 40 inches in men or 35 inches in women can increase the risk of weight-related health problems. · Talk with your doctor about steps you can take to stay healthy or improve your health. You may need to make lifestyle changes to lose weight and stay healthy, such as changing your diet and getting regular exercise. If you have a BMI lower than 18.5 · Your doctor may do other tests to check your risk for health problems. · Talk with your doctor about steps you can take to stay healthy or improve your health. You may need to make lifestyle changes to gain or maintain weight and stay healthy, such as getting more healthy foods in your diet and doing exercises to build muscle. Where can you learn more? Go to http://tom-suhail.info/. Enter S176 in the search box to learn more about \"Body Mass Index: Care Instructions. \" Current as of: October 13, 2016 Content Version: 11.4 © 5532-4631 Healthwise, Incorporated. Care instructions adapted under license by [employer] (which disclaims liability or warranty for this information). If you have questions about a medical condition or this instruction, always ask your healthcare professional. [address] any warranty or liability for your use of this information. Introducing [facility]! Angle Culver introduces [employer] patient portal. Now you can access parts of your medical record, email your doctor's office, and request medication refills online.    
 
1. In your internet browser, go to [email or web address] 2. Click on the First Time User? Click Here link in the Sign In box. You will see the New Member Sign Up page. 3. Enter your Mc[employer] Access Code exactly as it appears below. You will not need to use this code after youve completed the sign-up process. If you do not sign up before the expiration date, you must request a new code. · Mc[employer] Access Code: [email or web address] Expires: 1/16/2018 11:13 AM 
 
4. Enter the last four digits of your Social Security Number (xxxx) and Date of Birth (mm/dd/yyyy) as indicated and click Submit. You will be taken to the next sign-up page. 5. Create a Mc4 ID. This will be your Mc4 login ID and cannot be changed, so think of one that is secure and easy to remember. 6. Create a Mc4 password. You can change your password at any time. 7. Enter your Password Reset Question and Answer. This can be used at a later time if you forget your password. 8. Enter your e-mail address. You will receive e-mail notification when new information is available in [address] 9. Click Sign Up. You can now view and download portions of your medical record. 10. Click the Download Summary menu link to download a portable copy of your medical information. If you have questions, please visit the Frequently Asked Questions section of the Mc4 website. Remember, Mc4 is NOT to be used for urgent needs. For medical emergencies, dial 911. Now available from your iPhone and Android! Please provide this summary of care documentation to your next provider. Your primary care clinician is listed as Reynaldo Flores. If you have any questions after today's visit, please call [phone].

## 2018-01-15 ENCOUNTER — OFFICE VISIT (OUTPATIENT)
Dept: FAMILY MEDICINE CLINIC | Age: 68
End: 2018-01-15

## 2018-01-15 VITALS
OXYGEN SATURATION: 97 % | HEART RATE: 78 BPM | TEMPERATURE: 98.2 F | DIASTOLIC BLOOD PRESSURE: 64 MMHG | BODY MASS INDEX: 27.12 KG/M2 | HEIGHT: 62 IN | RESPIRATION RATE: 18 BRPM | SYSTOLIC BLOOD PRESSURE: 104 MMHG | WEIGHT: 147.4 LBS

## 2018-01-15 DIAGNOSIS — J45.40 MODERATE PERSISTENT ASTHMA WITHOUT COMPLICATION: ICD-10-CM

## 2018-01-15 DIAGNOSIS — I10 ESSENTIAL HYPERTENSION, BENIGN: ICD-10-CM

## 2018-01-15 DIAGNOSIS — E11.9 CONTROLLED TYPE 2 DIABETES MELLITUS WITHOUT COMPLICATION, WITHOUT LONG-TERM CURRENT USE OF INSULIN (HCC): Primary | ICD-10-CM

## 2018-01-15 DIAGNOSIS — M06.09 RHEUMATOID ARTHRITIS OF MULTIPLE SITES WITH NEGATIVE RHEUMATOID FACTOR (HCC): ICD-10-CM

## 2018-01-15 DIAGNOSIS — E55.9 VITAMIN D DEFICIENCY: ICD-10-CM

## 2018-01-15 DIAGNOSIS — F51.04 CHRONIC INSOMNIA: ICD-10-CM

## 2018-01-15 DIAGNOSIS — E78.5 HYPERLIPIDEMIA, UNSPECIFIED HYPERLIPIDEMIA TYPE: ICD-10-CM

## 2018-01-15 LAB — HBA1C MFR BLD HPLC: 6.4 %

## 2018-01-15 RX ORDER — ALBUTEROL SULFATE 90 UG/1
2 AEROSOL, METERED RESPIRATORY (INHALATION)
Qty: 1 INHALER | Refills: 11 | Status: SHIPPED | OUTPATIENT
Start: 2018-01-15 | End: 2018-07-16 | Stop reason: SDUPTHER

## 2018-01-15 RX ORDER — IPRATROPIUM BROMIDE 0.5 MG/2.5ML
0.5 SOLUTION RESPIRATORY (INHALATION)
Qty: 150 ML | Refills: 3 | Status: SHIPPED | OUTPATIENT
Start: 2018-01-15 | End: 2018-06-29 | Stop reason: SDUPTHER

## 2018-01-15 RX ORDER — METFORMIN HYDROCHLORIDE 1000 MG/1
1000 TABLET ORAL 2 TIMES DAILY WITH MEALS
Qty: 180 TAB | Refills: 3 | Status: SHIPPED | OUTPATIENT
Start: 2018-01-15 | End: 2018-05-17 | Stop reason: SDUPTHER

## 2018-01-15 RX ORDER — LANCETS
EACH MISCELLANEOUS
Qty: 102 EACH | Refills: 3 | Status: SHIPPED | OUTPATIENT
Start: 2018-01-15

## 2018-01-15 RX ORDER — CLONIDINE HYDROCHLORIDE 0.1 MG/1
TABLET ORAL
Qty: 270 TAB | Refills: 3 | Status: SHIPPED | OUTPATIENT
Start: 2018-01-15 | End: 2018-07-16 | Stop reason: SDUPTHER

## 2018-01-15 RX ORDER — MECLIZINE HYDROCHLORIDE 25 MG/1
25 TABLET ORAL
Qty: 90 TAB | Refills: 1 | Status: SHIPPED | OUTPATIENT
Start: 2018-01-15 | End: 2018-07-16 | Stop reason: SDUPTHER

## 2018-01-15 RX ORDER — ALBUTEROL SULFATE 2.5 MG/.5ML
2.5 SOLUTION RESPIRATORY (INHALATION)
Qty: 150 ML | Refills: 3 | Status: SHIPPED | OUTPATIENT
Start: 2018-01-15 | End: 2019-02-18 | Stop reason: SDUPTHER

## 2018-01-15 RX ORDER — ZOLPIDEM TARTRATE 10 MG/1
10 TABLET ORAL
Qty: 30 TAB | Refills: 2 | Status: SHIPPED | OUTPATIENT
Start: 2018-01-15 | End: 2018-04-26 | Stop reason: SDUPTHER

## 2018-01-15 RX ORDER — FLUTICASONE PROPIONATE AND SALMETEROL 500; 50 UG/1; UG/1
1 POWDER RESPIRATORY (INHALATION) 2 TIMES DAILY
Qty: 60 EACH | Refills: 3 | Status: SHIPPED | OUTPATIENT
Start: 2018-01-15 | End: 2018-07-16 | Stop reason: SDUPTHER

## 2018-01-15 RX ORDER — OMEPRAZOLE 40 MG/1
40 CAPSULE, DELAYED RELEASE ORAL DAILY
Qty: 90 CAP | Refills: 3 | Status: SHIPPED | OUTPATIENT
Start: 2018-01-15 | End: 2018-07-16 | Stop reason: SDUPTHER

## 2018-01-15 RX ORDER — LOSARTAN POTASSIUM 100 MG/1
100 TABLET ORAL DAILY
Qty: 90 TAB | Refills: 3 | Status: SHIPPED | OUTPATIENT
Start: 2018-01-15 | End: 2018-07-16 | Stop reason: SDUPTHER

## 2018-01-15 NOTE — PROGRESS NOTES
HISTORY OF PRESENT ILLNESS  Arsalan Mtz is a 75 y.o. female. HPI  aodm stable  Asthma stable  RA under rheum, good control  Review of Systems   Musculoskeletal: Positive for joint pain. All other systems reviewed and are negative. Past Medical History:   Diagnosis Date    Asthma     Diabetes ([address]     Hypertension     Vaginitis due to Candida albicans 4/3/2014     Current Outpatient Prescriptions on File Prior to Visit   Medication Sig Dispense Refill    methotrexate (RHEUMATREX) 2.5 mg tablet Take 8 Tabs by mouth Every Friday for 30 days. 32 Tab 3    hydroxychloroquine (PLAQUENIL) 200 mg tablet Take 1 Tab by mouth daily. 30 Tab 3    folic acid (FOLVITE) 1 mg tablet Take 1 Tab by mouth daily. 30 Tab 3    omeprazole (PRILOSEC) 40 mg capsule Take 1 Cap by mouth daily. 90 Cap 0    glucose blood VI test strips (ACCU-CHEK SMARTVIEW TEST STRIP) strip by Does Not Apply route See Admin Instructions. Test three times daily to follow hypoglycemia, labile glucoses 270 Strip 0    ADVAIR DISKUS 500-50 mcg/dose diskus inhaler INHALE ONE PUFF BY MOUTH EVERY 12 HOURS 60 Each 3    zolpidem (AMBIEN) 10 mg tablet TAKE ONE TABLET BY MOUTH NIGHTLY AS NEEDED FOR SLEEP 30 Tab 1    Lancets (LANCETS,ULTRA THIN) misc Test daily 102 Each 3    VENTOLIN HFA 90 mcg/actuation inhaler INHALE TWO PUFFS BY MOUTH EVERY 6 HOURS AS NEEDED FOR  WHEEZING 1 Inhaler 11    OTHER       ipratropium (ATROVENT) 0.02 % nebulizer solution INHALE 2.5ML VIA NEBULIZER FOUR TIMES DAILY AS NEEDED FOR WHEEZING. 150 mL 0    cloNIDine HCl (CATAPRES) 0.1 mg tablet 1 in AM, 2 in  Tab 3    losartan (COZAAR) 100 mg tablet Take 1 Tab by mouth daily. 90 Tab 3    albuterol sulfate (PROVENTIL;VENTOLIN) 2.5 mg/0.5 mL nebu nebulizer solution 0.5 mL by Nebulization route every four (4) hours as needed for Wheezing. 150 mL 3    albuterol (ACCUNEB) 1.25 mg/3 mL nebu 3 mL by Nebulization route every four (4) hours as needed.  540 mL 3    metFORMIN (GLUCOPHAGE) 1,000 mg tablet Take 1 Tab by mouth two (2) times daily (with meals). 180 Tab 3    meclizine (ANTIVERT) 25 mg tablet Take 1 Tab by mouth three (3) times daily as needed for Dizziness. 90 Tab 1    glucose blood VI test strips (ACCU-CHEK COMFORT CURVE TEST) strip Test blood sugar bid and prn for diagnosis 250.00 100 Strip 3    magnesium 250 mg tab Take  by mouth.  calcium-cholecalciferol, d3, (CALCIUM 600 + D) 600-125 mg-unit tab Take  by mouth.  cholecalciferol (VITAMIN D3) 1,000 unit tablet Take  by mouth daily.  OTHER accuchek comfort curve monitor kit 1 Device 0    glipiZIDE (GLUCOTROL) 5 mg tablet Take 1 tablet by mouth two (2) times a day. 180 tablet 0     No current facility-administered medications on file prior to visit. Visit Vitals    /64    Pulse 78    Temp 98.2 °F (36.8 °C)    Resp 18    Ht 5' 2\" (1.575 m)    Wt 147 lb 6.4 oz (66.9 kg)    SpO2 97%    BMI 26.96 kg/m2           Physical Exam   Constitutional: She appears well-developed and well-nourished. No distress. Musculoskeletal: Normal range of motion. She exhibits no edema, tenderness or deformity. Skin: She is not diaphoretic. Vitals reviewed.   A1C 6.4    ASSESSMENT and PLAN  aodm good control   hbp stable  ra stable  plan  Refills  Recheck in 6 months

## 2018-01-15 NOTE — PROGRESS NOTES
Carla Coley is a 75 y.o. female (: 1950) presenting to address:    Chief Complaint   Patient presents with    Hypertension       Vitals:    01/15/18 1322   BP: 104/64   Pulse: 78   Resp: 18   Temp: 98.2 °F (36.8 °C)   SpO2: 97%   Weight: 147 lb 6.4 oz (66.9 kg)   Height: 5' 2\" (1.575 m)   PainSc:   0 - No pain       Hearing/Vision:   No exam data present    Learning Assessment:     Learning Assessment 2015   PRIMARY LEARNER Patient   HIGHEST LEVEL OF EDUCATION - PRIMARY LEARNER  DID NOT GRADUATE HIGH SCHOOL   BARRIERS PRIMARY LEARNER NONE   CO-LEARNER CAREGIVER No   PRIMARY LANGUAGE ENGLISH    NEED -   LEARNER PREFERENCE PRIMARY OTHER (COMMENT)   ANSWERED BY patient   RELATIONSHIP SELF     Depression Screening:     PHQ over the last two weeks 1/15/2018   Little interest or pleasure in doing things Not at all   Feeling down, depressed or hopeless Not at all   Total Score PHQ 2 0     Fall Risk Assessment:     Fall Risk Assessment, last 12 mths 1/15/2018   Able to walk? Yes   Fall in past 12 months? No     Abuse Screening:     Abuse Screening Questionnaire 3/26/2014   Do you ever feel afraid of your partner? N   Are you in a relationship with someone who physically or mentally threatens you? N   Is it safe for you to go home? Y     Coordination of Care Questionaire:   1. Have you been to the ER, urgent care clinic since your last visit? Hospitalized since your last visit? NO    2. Have you seen or consulted any other health care providers outside of the [address] [place] [address] since your last visit? Include any pap smears or colon screening. NO    Advanced Directive:   1. Do you have an Advanced Directive? NO    2. Would you like information on Advanced Directives?  YES

## 2018-01-15 NOTE — PROGRESS NOTES
Discussed the patient's BMI with her. The BMI follow up plan is as follows:     dietary management education, guidance, and counseling  encourage exercise  monitor weight  prescribed dietary intake    An After Visit Summary was printed and given to the patient. Discussed the patient's BMI with her. The BMI follow up plan is as follows:     dietary management education, guidance, and counseling  encourage exercise  monitor weight  prescribed dietary intake    An After Visit Summary was printed and given to the patient.

## 2018-01-15 NOTE — MR AVS SNAPSHOT
[facility] 
 
 
 [address] [facility] [address] [phone] Patient: Margarita Velarde MRN: [id number] :1950 Visit Information Date & Time Provider Department Dept. Phone Encounter #  
 1/15/2018  1:30 PM Tg Chu, 3 [facility] [phone] [id number] Follow-up Instructions Return in about 6 months (around 7/15/2018). Follow-up and Disposition History Upcoming Health Maintenance Date Due  
 GLAUCOMA SCREENING Q2Y 2017 EYE EXAM RETINAL OR DILATED Q1 10/7/2018* FOOT EXAM Q1 3/7/2018 MICROALBUMIN Q1 3/7/2018 LIPID PANEL Q1 3/7/2018 MEDICARE YEARLY EXAM 3/8/2018 HEMOGLOBIN A1C Q6M 3/19/2018 BREAST CANCER SCRN MAMMOGRAM 8/15/2019 COLONOSCOPY 10/21/2024 DTaP/Tdap/Td series (2 - Td) 3/7/2027 *Topic was postponed. The date shown is not the original due date. Allergies as of 1/15/2018  Review Complete On: 1/15/2018 By: Tg Chu MD  
  
 Severity Noted Reaction Type Reactions Cefdinir  2015    Rash Current Immunizations  Reviewed on 10/21/2014 Name Date Influenza High Dose Vaccine PF 2017, 2015 Influenza Vaccine 10/21/2014  2:56 PM  
  
 Not reviewed this visit You Were Diagnosed With   
  
 Codes Comments Controlled type 2 diabetes mellitus without complication, without long-term current use of insulin ([facility]    -  Primary ICD-10-CM: E11.9 ICD-9-CM: 250.00 Moderate persistent asthma without complication     RYE--VIOLETA: J45.40 ICD-9-CM: 493.90 Essential hypertension, benign     ICD-10-CM: I10 
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 
ICD-9-CM: 714.0 Chronic insomnia     ICD-10-CM: F51.04 
ICD-9-CM: 780.52 Hyperlipidemia, unspecified hyperlipidemia type     ICD-10-CM: E78.5 ICD-9-CM: 272.4 Vitamin D deficiency     ICD-10-CM: E55.9 ICD-9-CM: 268.9 Vitals BP Pulse Temp Resp Height(growth percentile) Weight(growth percentile) 104/64 78 98.2 °F (36.8 °C) 18 5' 2\" (1.575 m) 147 lb 6.4 oz (66.9 kg) SpO2 BMI OB Status Smoking Status 97% 26.96 kg/m2 Postmenopausal Former Smoker BMI and BSA Data Body Mass Index Body Surface Area  
 26.96 kg/m 2 1.71 m 2 Preferred Pharmacy Pharmacy Name Phone [address] [facility] [address], [address] [place] [address],[address] [facility] [phone] Your Updated Medication List  
  
   
This list is accurate as of: 1/15/18  2:11 PM.  Always use your most recent med list.  
  
  
  
  
 * albuterol 1.25 mg/3 mL Nebu Commonly known as:  ACCUNEB  
3 mL by Nebulization route every four (4) hours as needed. * albuterol 90 mcg/actuation inhaler Commonly known as:  VENTOLIN HFA Take 2 Puffs by inhalation every six (6) hours as needed for Wheezing. * albuterol sulfate 2.5 mg/0.5 mL Nebu nebulizer solution Commonly known as:  PROVENTIL;VENTOLIN  
0.5 mL by Nebulization route every four (4) hours as needed for Wheezing. CALCIUM 600 + D 600-125 mg-unit Tab Generic drug:  calcium-cholecalciferol (d3) Take  by mouth.  
  
 cloNIDine HCl 0.1 mg tablet Commonly known as:  CATAPRES  
1 in AM, 2 in PM  
  
 fluticasone-salmeterol 500-50 mcg/dose diskus inhaler Commonly known as:  ADVAIR DISKUS Take 1 Puff by inhalation two (2) times a day. folic acid 1 mg tablet Commonly known as:  Pelon Take 1 Tab by mouth daily. glipiZIDE 5 mg tablet Commonly known as:  Herminio Saez Take 1 tablet by mouth two (2) times a day. * glucose blood VI test strips strip Commonly known as:  ACCU-CHEK COMFORT CURVE TEST Test blood sugar bid and prn for diagnosis 250.00  
  
 * glucose blood VI test strips strip Commonly known as:  [address] [facility]  
 by Does Not Apply route See Admin Instructions. Test three times daily to follow hypoglycemia, labile glucoses  
  
 hydroxychloroquine 200 mg tablet Commonly known as:  PLAQUENIL Take 1 Tab by mouth daily. ipratropium 0.02 % Soln Commonly known as:  ATROVENT  
2.5 mL by Nebulization route every four (4) hours as needed. Lancets Misc Commonly known as:  Saratha Lori Test daily  
  
 losartan 100 mg tablet Commonly known as:  COZAAR Take 1 Tab by mouth daily. magnesium 250 mg Tab Take  by mouth.  
  
 meclizine 25 mg tablet Commonly known as:  ANTIVERT Take 1 Tab by mouth three (3) times daily as needed for Dizziness. metFORMIN 1,000 mg tablet Commonly known as:  GLUCOPHAGE Take 1 Tab by mouth two (2) times daily (with meals). methotrexate 2.5 mg tablet Commonly known as:  Maia Ramp Take 8 Tabs by mouth Every Friday for 30 days. omeprazole 40 mg capsule Commonly known as:  PRILOSEC Take 1 Cap by mouth daily. OTHER  
  
 OTHER  
accuchek comfort curve monitor kit VITAMIN D3 1,000 unit tablet Generic drug:  cholecalciferol Take  by mouth daily. zolpidem 10 mg tablet Commonly known as:  AMBIEN Take 1 Tab by mouth nightly as needed for Sleep. Max Daily Amount: 10 mg.  
  
 * Notice: This list has 5 medication(s) that are the same as other medications prescribed for you. Read the directions carefully, and ask your doctor or other care provider to review them with you. Prescriptions Printed Refills  
 omeprazole (PRILOSEC) 40 mg capsule 3 Sig: Take 1 Cap by mouth daily. Class: Print Route: Oral  
 glucose blood VI test strips (ACCU-CHEK SMARTVIEW TEST STRIP) strip 0 Sig: by Does Not Apply route See Admin Instructions. Test three times daily to follow hypoglycemia, labile glucoses Class: Print Route: Does Not Apply fluticasone-salmeterol (ADVAIR DISKUS) 500-50 mcg/dose diskus inhaler 3 Sig: Take 1 Puff by inhalation two (2) times a day. Class: Print Route: Inhalation  
 zolpidem (AMBIEN) 10 mg tablet 2 Sig: Take 1 Tab by mouth nightly as needed for Sleep. Max Daily Amount: 10 mg.  
 Class: Print Route: Oral  
 Lancets (LANCETS,ULTRA THIN) misc 3 Sig: Test daily Class: Print  
 albuterol (VENTOLIN HFA) 90 mcg/actuation inhaler 11 Sig: Take 2 Puffs by inhalation every six (6) hours as needed for Wheezing. Class: Print Route: Inhalation  
 ipratropium (ATROVENT) 0.02 % soln 3 Si.5 mL by Nebulization route every four (4) hours as needed. Class: Print Route: Nebulization  
 cloNIDine HCl (CATAPRES) 0.1 mg tablet 3 Si in AM, 2 in PM  
 Class: Print  
 losartan (COZAAR) 100 mg tablet 3 Sig: Take 1 Tab by mouth daily. Class: Print Route: Oral  
 metFORMIN (GLUCOPHAGE) 1,000 mg tablet 3 Sig: Take 1 Tab by mouth two (2) times daily (with meals). Class: Print Route: Oral  
 meclizine (ANTIVERT) 25 mg tablet 1 Sig: Take 1 Tab by mouth three (3) times daily as needed for Dizziness. Class: Print Route: Oral  
  
Prescriptions Sent to Pharmacy Refills  
 albuterol sulfate (PROVENTIL;VENTOLIN) 2.5 mg/0.5 mL nebu nebulizer solution 3 Si.5 mL by Nebulization route every four (4) hours as needed for Wheezing. Class: Normal  
 Pharmacy: [facility] [address] Ph #: [phone] Route: Nebulization We Performed the Following AMB POC HEMOGLOBIN A1C [14227 CPT(R)] Follow-up Instructions Return in about 6 months (around 7/15/2018). To-Do List   
 01/15/2018 Lab:  CBC WITH AUTOMATED DIFF   
  
 01/15/2018 Lab:  HEMOGLOBIN A1C WITH EAG   
  
 01/15/2018 Lab:  LIPID PANEL   
  
 01/15/2018 Lab:  METABOLIC PANEL, COMPREHENSIVE   
  
 01/15/2018 Lab: MICROALBUMIN, UR, RAND W/ MICROALBUMIN/CREA RATIO   
  
 01/15/2018 Lab:  T4, FREE   
  
 01/15/2018 Lab:  TSH 3RD GENERATION   
  
 01/15/2018 Lab:  VITAMIN D, 25 HYDROXY Patient Instructions Body Mass Index: Care Instructions Your Care Instructions Body mass index (BMI) can help you see if your weight is raising your risk for health problems. It uses a formula to compare how much you weigh with how tall you are. · A BMI lower than 18.5 is considered underweight. · A BMI between 18.5 and 24.9 is considered healthy. · A BMI between 25 and 29.9 is considered overweight. A BMI of 30 or higher is considered obese. If your BMI is in the normal range, it means that you have a lower risk for weight-related health problems. If your BMI is in the overweight or obese range, you may be at increased risk for weight-related health problems, such as high blood pressure, heart disease, stroke, arthritis or joint pain, and diabetes. If your BMI is in the underweight range, you may be at increased risk for health problems such as fatigue, lower protection (immunity) against illness, muscle loss, bone loss, hair loss, and hormone problems. BMI is just one measure of your risk for weight-related health problems. You may be at higher risk for health problems if you are not active, you eat an unhealthy diet, or you drink too much alcohol or use tobacco products. Follow-up care is a key part of your treatment and safety. Be sure to make and go to all appointments, and call your doctor if you are having problems. It's also a good idea to know your test results and keep a list of the medicines you take. How can you care for yourself at home? · Practice healthy eating habits. This includes eating plenty of fruits, vegetables, whole grains, lean protein, and low-fat dairy. · If your doctor recommends it, get more exercise.  Walking is a good choice. Bit by bit, increase the amount you walk every day. Try for at least 30 minutes on most days of the week. · Do not smoke. Smoking can increase your risk for health problems. If you need help quitting, talk to your doctor about stop-smoking programs and medicines. These can increase your chances of quitting for good. · Limit alcohol to 2 drinks a day for men and 1 drink a day for women. Too much alcohol can cause health problems. If you have a BMI higher than 25 · Your doctor may do other tests to check your risk for weight-related health problems. This may include measuring the distance around your waist. A waist measurement of more than 40 inches in men or 35 inches in women can increase the risk of weight-related health problems. · Talk with your doctor about steps you can take to stay healthy or improve your health. You may need to make lifestyle changes to lose weight and stay healthy, such as changing your diet and getting regular exercise. If you have a BMI lower than 18.5 · Your doctor may do other tests to check your risk for health problems. · Talk with your doctor about steps you can take to stay healthy or improve your health. You may need to make lifestyle changes to gain or maintain weight and stay healthy, such as getting more healthy foods in your diet and doing exercises to build muscle. Where can you learn more? Go to http://tom-suhail.info/. Enter S176 in the search box to learn more about \"Body Mass Index: Care Instructions. \" Current as of: October 13, 2016 Content Version: 11.4 © 1651-4908 Healthwise, [employer]. Care instructions adapted under license by anchor.travel (which disclaims liability or warranty for this information). If you have questions about a medical condition or this instruction, always ask your healthcare professional. [place] [address] any warranty or liability for your use of this information. Body Mass Index: Care Instructions Your Care Instructions Body mass index (BMI) can help you see if your weight is raising your risk for health problems. It uses a formula to compare how much you weigh with how tall you are. · A BMI lower than 18.5 is considered underweight. · A BMI between 18.5 and 24.9 is considered healthy. · A BMI between 25 and 29.9 is considered overweight. A BMI of 30 or higher is considered obese. If your BMI is in the normal range, it means that you have a lower risk for weight-related health problems. If your BMI is in the overweight or obese range, you may be at increased risk for weight-related health problems, such as high blood pressure, heart disease, stroke, arthritis or joint pain, and diabetes. If your BMI is in the underweight range, you may be at increased risk for health problems such as fatigue, lower protection (immunity) against illness, muscle loss, bone loss, hair loss, and hormone problems. BMI is just one measure of your risk for weight-related health problems. You may be at higher risk for health problems if you are not active, you eat an unhealthy diet, or you drink too much alcohol or use tobacco products. Follow-up care is a key part of your treatment and safety. Be sure to make and go to all appointments, and call your doctor if you are having problems. It's also a good idea to know your test results and keep a list of the medicines you take. How can you care for yourself at home? · Practice healthy eating habits. This includes eating plenty of fruits, vegetables, whole grains, lean protein, and low-fat dairy. · If your doctor recommends it, get more exercise. Walking is a good choice. Bit by bit, increase the amount you walk every day. Try for at least 30 minutes on most days of the week. · Do not smoke. Smoking can increase your risk for health problems.  If you need help quitting, talk to your doctor about stop-smoking programs and medicines. These can increase your chances of quitting for good. · Limit alcohol to 2 drinks a day for men and 1 drink a day for women. Too much alcohol can cause health problems. If you have a BMI higher than 25 · Your doctor may do other tests to check your risk for weight-related health problems. This may include measuring the distance around your waist. A waist measurement of more than 40 inches in men or 35 inches in women can increase the risk of weight-related health problems. · Talk with your doctor about steps you can take to stay healthy or improve your health. You may need to make lifestyle changes to lose weight and stay healthy, such as changing your diet and getting regular exercise. If you have a BMI lower than 18.5 · Your doctor may do other tests to check your risk for health problems. · Talk with your doctor about steps you can take to stay healthy or improve your health. You may need to make lifestyle changes to gain or maintain weight and stay healthy, such as getting more healthy foods in your diet and doing exercises to build muscle. Where can you learn more? Go to http://tom-suhail.info/. Enter S176 in the search box to learn more about \"Body Mass Index: Care Instructions. \" Current as of: October 13, 2016 Content Version: 11.4 © 1937-7167 Healthwise, Patient [employer]versation [employer]. Care instructions adapted under license by [employer] (which disclaims liability or warranty for this information). If you have questions about a medical condition or this instruction, always ask your healthcare professional. [place] [address] any warranty or liability for your use of this information. Patient Instructions History Introducing [facility]! [facility] introduces [employer] patient portal. Now you can access parts of your medical record, email your doctor's office, and request medication refills online.    
 
1. In your internet browser, go to [email or web address] 2. Click on the First Time User? Click Here link in the Sign In box. You will see the New Member Sign Up page. 3. Enter your [facility] Access Code exactly as it appears below. You will not need to use this code after youve completed the sign-up process. If you do not sign up before the expiration date, you must request a new code. · [facility] Access Code: [email or web address] Expires: 1/16/2018 11:13 AM 
 
4. Enter the last four digits of your Social Security Number (xxxx) and Date of Birth (mm/dd/yyyy) as indicated and click Submit. You will be taken to the next sign-up page. 5. Create a Light Chaser Animationt ID. This will be your [facility] login ID and cannot be changed, so think of one that is secure and easy to remember. 6. Create a [facility] password. You can change your password at any time. 7. Enter your Password Reset Question and Answer. This can be used at a later time if you forget your password. 8. Enter your e-mail address. You will receive e-mail notification when new information is available in [address] 9. Click Sign Up. You can now view and download portions of your medical record. 10. Click the Download Summary menu link to download a portable copy of your medical information. If you have questions, please visit the Frequently Asked Questions section of the [facility] website. Remember, [facility] is NOT to be used for urgent needs. For medical emergencies, dial 911. Now available from your iPhone and Android! Please provide this summary of care documentation to your next provider. Your primary care clinician is listed as Zhang Brewster. If you have any questions after today's visit, please call [phone].

## 2018-03-18 RX ORDER — OMEPRAZOLE 40 MG/1
CAPSULE, DELAYED RELEASE ORAL
Qty: 90 CAP | Refills: 0 | Status: ON HOLD | OUTPATIENT
Start: 2018-03-18 | End: 2020-11-18

## 2018-03-20 ENCOUNTER — TELEPHONE (OUTPATIENT)
Dept: FAMILY MEDICINE CLINIC | Age: 68
End: 2018-03-20

## 2018-03-20 NOTE — TELEPHONE ENCOUNTER
Pt is requesting a referral to be generate for Dr Kelly Abel because she has an appt set up with her on 4/27/18. Please advise.

## 2018-04-03 RX ORDER — PIOGLITAZONEHYDROCHLORIDE 15 MG/1
TABLET ORAL
Qty: 90 TAB | Refills: 1 | Status: SHIPPED | OUTPATIENT
Start: 2018-04-03 | End: 2018-07-16 | Stop reason: SDUPTHER

## 2018-04-14 RX ORDER — ALBUTEROL SULFATE 0.83 MG/ML
SOLUTION RESPIRATORY (INHALATION)
Qty: 150 EACH | Refills: 3 | Status: SHIPPED | OUTPATIENT
Start: 2018-04-14 | End: 2018-07-06 | Stop reason: SDUPTHER

## 2018-04-23 ENCOUNTER — HOSPITAL ENCOUNTER (OUTPATIENT)
Dept: LAB | Age: 68
Discharge: HOME | End: 2018-04-23
Payer: MEDICARE

## 2018-04-23 ENCOUNTER — OFFICE VISIT (OUTPATIENT)
Dept: OBGYN CLINIC | Age: 68
End: 2018-04-23

## 2018-04-23 VITALS
RESPIRATION RATE: 18 BRPM | WEIGHT: 157 LBS | BODY MASS INDEX: 28.89 KG/M2 | HEIGHT: 62 IN | HEART RATE: 97 BPM | DIASTOLIC BLOOD PRESSURE: 85 MMHG | OXYGEN SATURATION: 99 % | SYSTOLIC BLOOD PRESSURE: 164 MMHG

## 2018-04-23 DIAGNOSIS — N95.2 ATROPHIC VAGINITIS: ICD-10-CM

## 2018-04-23 DIAGNOSIS — Z01.419 ENCOUNTER FOR WELL WOMAN EXAM WITH ROUTINE GYNECOLOGICAL EXAM: Primary | ICD-10-CM

## 2018-04-23 DIAGNOSIS — N94.10 DYSPAREUNIA, FEMALE: ICD-10-CM

## 2018-04-23 PROCEDURE — 88175 CYTOPATH C/V AUTO FLUID REDO: CPT | Performed by: OBSTETRICS & GYNECOLOGY

## 2018-04-23 PROCEDURE — 87624 HPV HI-RISK TYP POOLED RSLT: CPT | Performed by: OBSTETRICS & GYNECOLOGY

## 2018-04-23 RX ORDER — ESTRADIOL 0.1 MG/G
CREAM VAGINAL
Qty: 42.5 G | Refills: 3 | Status: SHIPPED | OUTPATIENT
Start: 2018-04-23 | End: 2018-07-16 | Stop reason: SDUPTHER

## 2018-04-23 NOTE — PROGRESS NOTES
Subjective:   75 y.o. female for Well Woman Check. No LMP recorded. Patient is postmenopausal.    Social History: single partner, contraception - none. Pertinent past medical hstory: hypertension, DM, no history of DVT, CAD, liver disease, migraines or smoking. Current Outpatient Prescriptions   Medication Sig Dispense Refill    estradiol (ESTRACE) 0.01 % (0.1 mg/gram) vaginal cream Insert 0.5 gram into vagina nightly for 2 weeks, then use two times a week. 42.5 g 3    albuterol (PROVENTIL VENTOLIN) 2.5 mg /3 mL (0.083 %) nebulizer solution USE ONE VIAL IN NEBULIZER EVERY 4 HOURS AS NEEDED FOR WHEEZING 150 Each 3    pioglitazone (ACTOS) 15 mg tablet TAKE ONE TABLET BY MOUTH ONCE DAILY WITH  DINNER 90 Tab 1    omeprazole (PRILOSEC) 40 mg capsule TAKE ONE CAPSULE BY MOUTH ONCE DAILY 90 Cap 0    omeprazole (PRILOSEC) 40 mg capsule Take 1 Cap by mouth daily. 90 Cap 3    glucose blood VI test strips (ACCU-CHEK SMARTVIEW TEST STRIP) strip by Does Not Apply route See Admin Instructions. Test three times daily to follow hypoglycemia, labile glucoses 270 Strip 0    fluticasone-salmeterol (ADVAIR DISKUS) 500-50 mcg/dose diskus inhaler Take 1 Puff by inhalation two (2) times a day. 60 Each 3    zolpidem (AMBIEN) 10 mg tablet Take 1 Tab by mouth nightly as needed for Sleep. Max Daily Amount: 10 mg. 30 Tab 2    Lancets (LANCETS,ULTRA THIN) misc Test daily 102 Each 3    albuterol (VENTOLIN HFA) 90 mcg/actuation inhaler Take 2 Puffs by inhalation every six (6) hours as needed for Wheezing. 1 Inhaler 11    ipratropium (ATROVENT) 0.02 % soln 2.5 mL by Nebulization route every four (4) hours as needed. 150 mL 3    albuterol sulfate (PROVENTIL;VENTOLIN) 2.5 mg/0.5 mL nebu nebulizer solution 0.5 mL by Nebulization route every four (4) hours as needed for Wheezing. 150 mL 3    cloNIDine HCl (CATAPRES) 0.1 mg tablet 1 in AM, 2 in  Tab 3    losartan (COZAAR) 100 mg tablet Take 1 Tab by mouth daily.  90 Tab 3    metFORMIN (GLUCOPHAGE) 1,000 mg tablet Take 1 Tab by mouth two (2) times daily (with meals). 180 Tab 3    meclizine (ANTIVERT) 25 mg tablet Take 1 Tab by mouth three (3) times daily as needed for Dizziness. 90 Tab 1    hydroxychloroquine (PLAQUENIL) 200 mg tablet Take 1 Tab by mouth daily. 30 Tab 3    folic acid (FOLVITE) 1 mg tablet Take 1 Tab by mouth daily. 30 Tab 3    OTHER       albuterol (ACCUNEB) 1.25 mg/3 mL nebu 3 mL by Nebulization route every four (4) hours as needed. 540 mL 3    glucose blood VI test strips (ACCU-CHEK COMFORT CURVE TEST) strip Test blood sugar bid and prn for diagnosis 250.00 100 Strip 3    glipiZIDE (GLUCOTROL) 5 mg tablet Take 1 tablet by mouth two (2) times a day. 180 tablet 0    magnesium 250 mg tab Take  by mouth.  calcium-cholecalciferol, d3, (CALCIUM 600 + D) 600-125 mg-unit tab Take  by mouth.  cholecalciferol (VITAMIN D3) 1,000 unit tablet Take  by mouth daily.  OTHER accuchek comfort curve monitor kit 1 Device 0     Allergies   Allergen Reactions    Cefdinir Rash     Past Medical History:   Diagnosis Date    Asthma     Diabetes ([facility] [address]     Hypertension     Vaginitis due to Candida albicans 4/3/2014     Past Surgical History:   Procedure Laterality Date    HX BREAST BIOPSY      x 3-4    HX  SECTION      x2    HX TUBAL LIGATION       Family History   Problem Relation Age of Onset    Diabetes Mother     Hypertension Mother     Diabetes Father     Hypertension Father     Diabetes Sister      Social History   Substance Use Topics    Smoking status: Former Smoker    Smokeless tobacco: Never Used    Alcohol use No        ROS:  Feeling well. No dyspnea or chest pain on exertion. No abdominal pain, change in bowel habits, black or bloody stools. No urinary tract symptoms.  GYN ROS: no breast pain or new or enlarging lumps on self exam, no vaginal bleeding, no discharge or pelvic pain, no hot flashes, she complains of painful intercourse and vaginal dryness . No neurological complaints. Objective:     Visit Vitals    /85 (BP 1 Location: Left arm, BP Patient Position: Sitting)    Pulse 97    Resp 18    Ht 5' 2\" (1.575 m)    Wt 157 lb (71.2 kg)    SpO2 99%    BMI 28.72 kg/m2     The patient appears well, alert, oriented x 3, in no distress. ENT normal.  Neck supple. No adenopathy or thyromegaly. MI. Lungs are clear, good air entry, no wheezes, rhonchi or rales. S1 and S2 normal, no murmurs, regular rate and rhythm. Abdomen soft without tenderness, guarding, mass or organomegaly. Extremities show no edema, normal peripheral pulses. Neurological is normal, no focal findings. BREAST EXAM: breasts appear normal, no suspicious masses, no skin or nipple changes or axillary nodes    PELVIC EXAM: VULVA: normal appearing vulva with no masses, tenderness or lesions, VAGINA: atrophic and tender on speculum insertion, CERVIX: normal appearing cervix without discharge or lesions, UTERUS: uterus is normal size, shape, consistency and nontender, ADNEXA: normal adnexa in size, nontender and no masses, PAP: Pap smear done today, HPV test    Assessment/Plan:   well woman  Dyspareunia due to atrophic vaginitis. Rx Estrace cream, instructions given. Mammogram due 8/18  pap smear  Follow up 3 months. Plan of care discussed. Patient expressed understanding.

## 2018-04-25 ENCOUNTER — OFFICE VISIT (OUTPATIENT)
Dept: FAMILY MEDICINE CLINIC | Age: 68
End: 2018-04-25

## 2018-04-25 VITALS
WEIGHT: 157 LBS | TEMPERATURE: 96.1 F | RESPIRATION RATE: 16 BRPM | BODY MASS INDEX: 28.89 KG/M2 | SYSTOLIC BLOOD PRESSURE: 158 MMHG | HEART RATE: 93 BPM | OXYGEN SATURATION: 97 % | DIASTOLIC BLOOD PRESSURE: 88 MMHG | HEIGHT: 62 IN

## 2018-04-25 DIAGNOSIS — E11.9 CONTROLLED TYPE 2 DIABETES MELLITUS WITHOUT COMPLICATION, WITHOUT LONG-TERM CURRENT USE OF INSULIN (HCC): Primary | ICD-10-CM

## 2018-04-25 DIAGNOSIS — Z12.11 SCREEN FOR COLON CANCER: ICD-10-CM

## 2018-04-25 DIAGNOSIS — J30.2 SEASONAL ALLERGIC RHINITIS, UNSPECIFIED TRIGGER: ICD-10-CM

## 2018-04-25 DIAGNOSIS — Z00.00 ROUTINE GENERAL MEDICAL EXAMINATION AT A HEALTH CARE FACILITY: ICD-10-CM

## 2018-04-25 DIAGNOSIS — J01.10 ACUTE NON-RECURRENT FRONTAL SINUSITIS: ICD-10-CM

## 2018-04-25 RX ORDER — AZITHROMYCIN 500 MG/1
500 TABLET, FILM COATED ORAL DAILY
Qty: 7 TAB | Refills: 0 | Status: SHIPPED | OUTPATIENT
Start: 2018-04-25 | End: 2018-05-02

## 2018-04-25 RX ORDER — FLUTICASONE PROPIONATE 50 MCG
2 SPRAY, SUSPENSION (ML) NASAL DAILY
Qty: 1 BOTTLE | Refills: 3 | Status: SHIPPED | OUTPATIENT
Start: 2018-04-25 | End: 2018-07-16 | Stop reason: SDUPTHER

## 2018-04-25 RX ORDER — HYDROXYCHLOROQUINE SULFATE 200 MG/1
200 TABLET, FILM COATED ORAL 2 TIMES DAILY
Qty: 60 TAB | Refills: 3
Start: 2018-04-25

## 2018-04-25 NOTE — PATIENT INSTRUCTIONS
Body Mass Index: Care Instructions  Your Care Instructions    Body mass index (BMI) can help you see if your weight is raising your risk for health problems. It uses a formula to compare how much you weigh with how tall you are. · A BMI lower than 18.5 is considered underweight. · A BMI between 18.5 and 24.9 is considered healthy. · A BMI between 25 and 29.9 is considered overweight. A BMI of 30 or higher is considered obese. If your BMI is in the normal range, it means that you have a lower risk for weight-related health problems. If your BMI is in the overweight or obese range, you may be at increased risk for weight-related health problems, such as high blood pressure, heart disease, stroke, arthritis or joint pain, and diabetes. If your BMI is in the underweight range, you may be at increased risk for health problems such as fatigue, lower protection (immunity) against illness, muscle loss, bone loss, hair loss, and hormone problems. BMI is just one measure of your risk for weight-related health problems. You may be at higher risk for health problems if you are not active, you eat an unhealthy diet, or you drink too much alcohol or use tobacco products. Follow-up care is a key part of your treatment and safety. Be sure to make and go to all appointments, and call your doctor if you are having problems. It's also a good idea to know your test results and keep a list of the medicines you take. How can you care for yourself at home? · Practice healthy eating habits. This includes eating plenty of fruits, vegetables, whole grains, lean protein, and low-fat dairy. · If your doctor recommends it, get more exercise. Walking is a good choice. Bit by bit, increase the amount you walk every day. Try for at least 30 minutes on most days of the week. · Do not smoke. Smoking can increase your risk for health problems. If you need help quitting, talk to your doctor about stop-smoking programs and medicines. These can increase your chances of quitting for good. · Limit alcohol to 2 drinks a day for men and 1 drink a day for women. Too much alcohol can cause health problems. If you have a BMI higher than 25  · Your doctor may do other tests to check your risk for weight-related health problems. This may include measuring the distance around your waist. A waist measurement of more than 40 inches in men or 35 inches in women can increase the risk of weight-related health problems. · Talk with your doctor about steps you can take to stay healthy or improve your health. You may need to make lifestyle changes to lose weight and stay healthy, such as changing your diet and getting regular exercise. If you have a BMI lower than 18.5  · Your doctor may do other tests to check your risk for health problems. · Talk with your doctor about steps you can take to stay healthy or improve your health. You may need to make lifestyle changes to gain or maintain weight and stay healthy, such as getting more healthy foods in your diet and doing exercises to build muscle. Where can you learn more? Go to http://tom-suhail.info/. Enter S176 in the search box to learn more about \"Body Mass Index: Care Instructions. \"  Current as of: October 13, 2016  Content Version: 11.4  © 6550-4006 [employer]. Care instructions adapted under license by [employer] (which disclaims liability or warranty for this information). If you have questions about a medical condition or this instruction, always ask your healthcare professional. [place] [address] any warranty or liability for your use of this information. Medicare Wellness Visit, Female    The best way to live healthy is to have a healthy lifestyle by eating a well-balanced diet, exercising regularly, limiting alcohol and stopping smoking. Regular physical exams and screening tests are another way to keep healthy.  Preventive exams provided by your health care provider can find health problems before they become diseases or illnesses. Preventive services including immunizations, screening tests, monitoring and exams can help you take care of your own health. All people over age 72 should have a pneumovax  and and a prevnar shot to prevent pneumonia. These are once in a lifetime unless you and your provider decide differently. All people over 65 should have a yearly flu shot and a tetanus vaccine every 10 years. A bone mass density to screen for osteoporosis or thinning of the bones should be done every 2 years after 65. Screening for diabetes mellitus with a blood sugar test should be done every year. Glaucoma is a disease of the eye due to increased ocular pressure that can lead to blindness and it should be done every year by an eye professional.    Cardiovascular screening tests that check for elevated lipids (fatty part of blood) which can lead to heart disease and strokes should be done every 5 years. Colorectal screening that evaluates for blood or polyps in your colon should be done yearly as a stool test or every five years as a flexible sigmoidoscope or every 10 years as a colonoscopy up to age 76. Breast cancer screening with a mammogram is recommended biennially  for women age 54-69. Screening for cervical cancer with a pap smear and pelvic exam is recommended for women after age 72 years every 2 years up to age 79 or when the provider and patient decide to stop. If there is a history of cervical abnormalities or other increased risk for cancer then the test is recommended yearly. Hepatitis C screening is also recommended for anyone born between [birth year] through [address]. A shingles vaccine is also recommended once in a lifetime after age 61. Your Medicare Wellness Exam is recommended annually. Here is a list of your current Health Maintenance items with a due date:   There are no preventive care reminders to display for this patient. Medicare Wellness Visit, Female    The best way to live healthy is to have a healthy lifestyle by eating a well-balanced diet, exercising regularly, limiting alcohol and stopping smoking. Regular physical exams and screening tests are another way to keep healthy. Preventive exams provided by your health care provider can find health problems before they become diseases or illnesses. Preventive services including immunizations, screening tests, monitoring and exams can help you take care of your own health. All people over age 72 should have a pneumovax  and and a prevnar shot to prevent pneumonia. These are once in a lifetime unless you and your provider decide differently. All people over 65 should have a yearly flu shot and a tetanus vaccine every 10 years. A bone mass density to screen for osteoporosis or thinning of the bones should be done every 2 years after 65. Screening for diabetes mellitus with a blood sugar test should be done every year. Glaucoma is a disease of the eye due to increased ocular pressure that can lead to blindness and it should be done every year by an eye professional.    Cardiovascular screening tests that check for elevated lipids (fatty part of blood) which can lead to heart disease and strokes should be done every 5 years. Colorectal screening that evaluates for blood or polyps in your colon should be done yearly as a stool test or every five years as a flexible sigmoidoscope or every 10 years as a colonoscopy up to age 76. Breast cancer screening with a mammogram is recommended biennially  for women age 54-69. Screening for cervical cancer with a pap smear and pelvic exam is recommended for women after age 72 years every 2 years up to age 79 or when the provider and patient decide to stop. If there is a history of cervical abnormalities or other increased risk for cancer then the test is recommended yearly. Hepatitis C screening is also recommended for anyone born between [birth year] [address]. A shingles vaccine is also recommended once in a lifetime after age 61. Your Medicare Wellness Exam is recommended annually. Here is a list of your current Health Maintenance items with a due date: There are no preventive care reminders to display for this patient.

## 2018-04-25 NOTE — PROGRESS NOTES
Ramiro Franco is a 75 y.o. female (: 1950) presenting to address:    Chief Complaint   Patient presents with    Annual Wellness Visit    Headache     pain scale 8/10       Vitals:    18 0857   BP: 158/88   Pulse: 93   Resp: 16   Temp: 96.1 °F (35.6 °C)   TempSrc: Oral   SpO2: 97%   Weight: 157 lb (71.2 kg)   Height: 5' 2\" (1.575 m)   PainSc:   8   PainLoc: Head       Hearing/Vision:   No exam data present    Learning Assessment:     Learning Assessment 2015   PRIMARY LEARNER Patient   HIGHEST LEVEL OF EDUCATION - PRIMARY LEARNER  DID NOT GRADUATE HIGH SCHOOL   BARRIERS PRIMARY LEARNER NONE   CO-LEARNER CAREGIVER No   PRIMARY LANGUAGE ENGLISH    NEED -   LEARNER PREFERENCE PRIMARY OTHER (COMMENT)   ANSWERED BY patient   RELATIONSHIP SELF     Depression Screening:     PHQ over the last two weeks 2018   Little interest or pleasure in doing things Not at all   Feeling down, depressed or hopeless Not at all   Total Score PHQ 2 0     Fall Risk Assessment:     Fall Risk Assessment, last 12 mths 2018   Able to walk? Yes   Fall in past 12 months? No     Abuse Screening:     Abuse Screening Questionnaire 3/26/2014   Do you ever feel afraid of your partner? N   Are you in a relationship with someone who physically or mentally threatens you? N   Is it safe for you to go home? Y     Coordination of Care Questionaire:   1. Have you been to the ER, urgent care clinic since your last visit? Hospitalized since your last visit? NO    2. Have you seen or consulted any other health care providers outside of the [address] [place] [address] since your last visit? Include any pap smears or colon screening. NO    Advanced Directive:   1. Do you have an Advanced Directive? NO    2. Would you like information on Advanced Directives?  NO

## 2018-04-25 NOTE — MR AVS SNAPSHOT
[address] [facility] 
 
 
 [address] [facility] [id number] [phone] Patient: Rosemary No MRN: [id number] :1950 Visit Information Date & Time Provider Department Dept. Phone Encounter #  
 2018  9:00 AM Nadine Don, 3 [facility] [phone] [id number] Your Appointments 2018  1:30 PM  
Follow Up with Nadine Don MD  
3 [facility] Appt Note: 6 month f/u; r/s for 30 mins wellness due  
 [address] [facility] [address] [phone]  
  
   
 [address] 2018  8:00 AM  
Office Visit with Cristian Mari DO [address] [place] [address],B-1 OBGYN ([facility]) Appt Note: 3month follow up 100 [facility] [address] [facility] [address] [phone]  
  
   
 [address] [facility] [address] Upcoming Health Maintenance Date Due  
 EYE EXAM RETINAL OR DILATED Q1 10/7/2018* HEMOGLOBIN A1C Q6M 7/15/2018 FOOT EXAM Q1 2019 MICROALBUMIN Q1 2019 LIPID PANEL Q1 2019 MEDICARE YEARLY EXAM 2019 BREAST CANCER SCRN MAMMOGRAM 8/15/2019 GLAUCOMA SCREENING Q2Y 3/23/2020 COLONOSCOPY 10/21/2024 DTaP/Tdap/Td series (2 - Td) 3/7/2027 *Topic was postponed. The date shown is not the original due date. Allergies as of 2018  Review Complete On: 2018 By: Nadine Don MD  
  
 Severity Noted Reaction Type Reactions Cefdinir  2015    Rash Current Immunizations  Reviewed on 10/21/2014 Name Date Influenza High Dose Vaccine PF 2017, 2015 Influenza Vaccine 10/21/2014  2:56 PM  
  
 Not reviewed this visit You Were Diagnosed With   
  
 Codes Comments  Controlled type 2 diabetes mellitus without complication, without long-term current use of insulin ([facility]    -  Primary ICD-10-CM: E11.9 ICD-9-CM: 250.00 Acute non-recurrent frontal sinusitis     ICD-10-CM: J01.10 ICD-9-CM: 427.1 Seasonal allergic rhinitis, unspecified trigger     ICD-10-CM: J30.2 ICD-9-CM: 477.9 Screen for colon cancer     ICD-10-CM: Z12.11 ICD-9-CM: V76.51 Routine general medical examination at a health care facility     ICD-10-CM: Z00.00 ICD-9-CM: V70.0 Vitals BP Pulse Temp Resp Height(growth percentile) Weight(growth percentile) 158/88 (BP 1 Location: Right arm, BP Patient Position: Sitting) 93 96.1 °F (35.6 °C) (Oral) 16 5' 2\" (1.575 m) 157 lb (71.2 kg) SpO2 BMI OB Status Smoking Status 97% 28.72 kg/m2 Postmenopausal Former Smoker BMI and BSA Data Body Mass Index Body Surface Area 28.72 kg/m 2 1.76 m 2 Preferred Pharmacy Pharmacy Name Phone [address] [facility] [address], [address] [place] [address] [address] [facility] [phone] Your Updated Medication List  
  
   
This list is accurate as of 4/25/18 10:03 AM.  Always use your most recent med list.  
  
  
  
  
 * albuterol 1.25 mg/3 mL Nebu Commonly known as:  ACCUNEB  
3 mL by Nebulization route every four (4) hours as needed. * albuterol 90 mcg/actuation inhaler Commonly known as:  VENTOLIN HFA Take 2 Puffs by inhalation every six (6) hours as needed for Wheezing. * albuterol sulfate 2.5 mg/0.5 mL Nebu nebulizer solution Commonly known as:  PROVENTIL;VENTOLIN  
0.5 mL by Nebulization route every four (4) hours as needed for Wheezing. * albuterol 2.5 mg /3 mL (0.083 %) nebulizer solution Commonly known as:  PROVENTIL VENTOLIN  
USE ONE VIAL IN NEBULIZER EVERY 4 HOURS AS NEEDED FOR WHEEZING  
  
 azithromycin 500 mg Tab Commonly known as:  Dandre Many Take 1 Tab by mouth daily for 7 days. CALCIUM 600 + D 600-125 mg-unit Tab Generic drug:  calcium-cholecalciferol (d3) Take  by mouth. cloNIDine HCl 0.1 mg tablet Commonly known as:  CATAPRES  
1 in AM, 2 in PM  
  
 estradiol 0.01 % (0.1 mg/gram) vaginal cream  
Commonly known as:  ESTRACE Insert 0.5 gram into vagina nightly for 2 weeks, then use two times a week. fluticasone 50 mcg/actuation nasal spray Commonly known as:  Chele Villalta 2 Sprays by Both Nostrils route daily. fluticasone-salmeterol 500-50 mcg/dose diskus inhaler Commonly known as:  ADVAIR DISKUS Take 1 Puff by inhalation two (2) times a day. * glucose blood VI test strips strip Commonly known as:  ACCU-CHEK COMFORT CURVE TEST Test blood sugar bid and prn for diagnosis 250.00  
  
 * glucose blood VI test strips strip Commonly known as:  ACCU-CHEK SMARTVIEW TEST STRIP  
by Does Not Apply route See Admin Instructions. Test three times daily to follow hypoglycemia, labile glucoses  
  
 hydroxychloroquine 200 mg tablet Commonly known as:  PLAQUENIL Take 1 Tab by mouth two (2) times a day. ipratropium 0.02 % Soln Commonly known as:  ATROVENT  
2.5 mL by Nebulization route every four (4) hours as needed. Lancets Misc Commonly known as:  Eliza Crandall Test daily  
  
 losartan 100 mg tablet Commonly known as:  COZAAR Take 1 Tab by mouth daily. meclizine 25 mg tablet Commonly known as:  ANTIVERT Take 1 Tab by mouth three (3) times daily as needed for Dizziness. metFORMIN 1,000 mg tablet Commonly known as:  GLUCOPHAGE Take 1 Tab by mouth two (2) times daily (with meals). * omeprazole 40 mg capsule Commonly known as:  PRILOSEC Take 1 Cap by mouth daily. * omeprazole 40 mg capsule Commonly known as:  PRILOSEC  
TAKE ONE CAPSULE BY MOUTH ONCE DAILY  
  
 OTHER  
accuchek comfort curve monitor kit  
  
 pioglitazone 15 mg tablet Commonly known as:  ACTOS  
TAKE ONE TABLET BY MOUTH ONCE DAILY WITH  DINNER  
  
 VITAMIN D3 1,000 unit tablet Generic drug:  cholecalciferol Take  by mouth daily. zolpidem 10 mg tablet Commonly known as:  AMBIEN Take 1 Tab by mouth nightly as needed for Sleep. Max Daily Amount: 10 mg.  
  
 * Notice: This list has 8 medication(s) that are the same as other medications prescribed for you. Read the directions carefully, and ask your doctor or other care provider to review them with you. Prescriptions Sent to Pharmacy Refills  
 azithromycin (ZITHROMAX) 500 mg tab 0 Sig: Take 1 Tab by mouth daily for 7 days. Class: Normal  
 Pharmacy: [facility], [address] Ph #: [phone] Route: Oral  
 fluticasone (FLONASE) 50 mcg/actuation nasal spray 3 Si Sprays by Both Nostrils route daily. Class: Normal  
 Pharmacy: [facility], [address] Ph #: [phone] Route: Both Nostrils We Performed the Following REFERRAL FOR COLONOSCOPY [SRU810 Custom] Referral Information Referral ID Referred By Referred To  
  
 [id number] Migel Corley MD   
   [place] [address] [address] [place], [address] [place] [address] Phone: [phone] Fax: [phone] Visits Status Start Date End Date 1 New Request 18 If your referral has a status of pending review or denied, additional information will be sent to support the outcome of this decision. Patient Instructions Body Mass Index: Care Instructions Your Care Instructions Body mass index (BMI) can help you see if your weight is raising your risk for health problems. It uses a formula to compare how much you weigh with how tall you are. · A BMI lower than 18.5 is considered underweight. · A BMI between 18.5 and 24.9 is considered healthy. · A BMI between 25 and 29.9 is considered overweight. A BMI of 30 or higher is considered obese. If your BMI is in the normal range, it means that you have a lower risk for weight-related health problems. If your BMI is in the overweight or obese range, you may be at increased risk for weight-related health problems, such as high blood pressure, heart disease, stroke, arthritis or joint pain, and diabetes. If your BMI is in the underweight range, you may be at increased risk for health problems such as fatigue, lower protection (immunity) against illness, muscle loss, bone loss, hair loss, and hormone problems. BMI is just one measure of your risk for weight-related health problems. You may be at higher risk for health problems if you are not active, you eat an unhealthy diet, or you drink too much alcohol or use tobacco products. Follow-up care is a key part of your treatment and safety. Be sure to make and go to all appointments, and call your doctor if you are having problems. It's also a good idea to know your test results and keep a list of the medicines you take. How can you care for yourself at home? · Practice healthy eating habits. This includes eating plenty of fruits, vegetables, whole grains, lean protein, and low-fat dairy. · If your doctor recommends it, get more exercise. Walking is a good choice. Bit by bit, increase the amount you walk every day. Try for at least 30 minutes on most days of the week. · Do not smoke. Smoking can increase your risk for health problems. If you need help quitting, talk to your doctor about stop-smoking programs and medicines. These can increase your chances of quitting for good. · Limit alcohol to 2 drinks a day for men and 1 drink a day for women. Too much alcohol can cause health problems. If you have a BMI higher than 25 · Your doctor may do other tests to check your risk for weight-related health problems.  This may include measuring the distance around your waist. A waist measurement of more than 40 inches in men or 35 inches in women can increase the risk of weight-related health problems. · Talk with your doctor about steps you can take to stay healthy or improve your health. You may need to make lifestyle changes to lose weight and stay healthy, such as changing your diet and getting regular exercise. If you have a BMI lower than 18.5 · Your doctor may do other tests to check your risk for health problems. · Talk with your doctor about steps you can take to stay healthy or improve your health. You may need to make lifestyle changes to gain or maintain weight and stay healthy, such as getting more healthy foods in your diet and doing exercises to build muscle. Where can you learn more? Go to http://tom-suhail.info/. Enter S176 in the search box to learn more about \"Body Mass Index: Care Instructions. \" Current as of: October 13, 2016 Content Version: 11.4 © 8359-4331 [employer]. Care instructions adapted under license by [employer] (which disclaims liability or warranty for this information). If you have questions about a medical condition or this instruction, always ask your healthcare professional. [place] [address] any warranty or liability for your use of this information. Medicare Wellness Visit, Female The best way to live healthy is to have a healthy lifestyle by eating a well-balanced diet, exercising regularly, limiting alcohol and stopping smoking. Regular physical exams and screening tests are another way to keep healthy. Preventive exams provided by your health care provider can find health problems before they become diseases or illnesses. Preventive services including immunizations, screening tests, monitoring and exams can help you take care of your own health. All people over age 72 should have a pneumovax  and and a prevnar shot to prevent pneumonia. These are once in a lifetime unless you and your provider decide differently. All people over 65 should have a yearly flu shot and a tetanus vaccine every 10 years. A bone mass density to screen for osteoporosis or thinning of the bones should be done every 2 years after 65. Screening for diabetes mellitus with a blood sugar test should be done every year. Glaucoma is a disease of the eye due to increased ocular pressure that can lead to blindness and it should be done every year by an eye professional. 
 
Cardiovascular screening tests that check for elevated lipids (fatty part of blood) which can lead to heart disease and strokes should be done every 5 years. Colorectal screening that evaluates for blood or polyps in your colon should be done yearly as a stool test or every five years as a flexible sigmoidoscope or every 10 years as a colonoscopy up to age 76. Breast cancer screening with a mammogram is recommended biennially  for women age 54-69. Screening for cervical cancer with a pap smear and pelvic exam is recommended for women after age 72 years every 2 years up to age 79 or when the provider and patient decide to stop. If there is a history of cervical abnormalities or other increased risk for cancer then the test is recommended yearly. Hepatitis C screening is also recommended for anyone born between [birth year] through [address]. A shingles vaccine is also recommended once in a lifetime after age 61. Your Medicare Wellness Exam is recommended annually. Here is a list of your current Health Maintenance items with a due date: There are no preventive care reminders to display for this patient. Medicare Wellness Visit, Female The best way to live healthy is to have a healthy lifestyle by eating a well-balanced diet, exercising regularly, limiting alcohol and stopping smoking. Regular physical exams and screening tests are another way to keep healthy.  Preventive exams provided by your health care provider can find health problems before they become diseases or illnesses. Preventive services including immunizations, screening tests, monitoring and exams can help you take care of your own health. All people over age 72 should have a pneumovax  and and a prevnar shot to prevent pneumonia. These are once in a lifetime unless you and your provider decide differently. All people over 65 should have a yearly flu shot and a tetanus vaccine every 10 years. A bone mass density to screen for osteoporosis or thinning of the bones should be done every 2 years after 65. Screening for diabetes mellitus with a blood sugar test should be done every year. Glaucoma is a disease of the eye due to increased ocular pressure that can lead to blindness and it should be done every year by an eye professional. 
 
Cardiovascular screening tests that check for elevated lipids (fatty part of blood) which can lead to heart disease and strokes should be done every 5 years. Colorectal screening that evaluates for blood or polyps in your colon should be done yearly as a stool test or every five years as a flexible sigmoidoscope or every 10 years as a colonoscopy up to age 76. Breast cancer screening with a mammogram is recommended biennially  for women age 54-69. Screening for cervical cancer with a pap smear and pelvic exam is recommended for women after age 72 years every 2 years up to age 79 or when the provider and patient decide to stop. If there is a history of cervical abnormalities or other increased risk for cancer then the test is recommended yearly. Hepatitis C screening is also recommended for anyone born between [birth year] [address]. A shingles vaccine is also recommended once in a lifetime after age 61. Your Medicare Wellness Exam is recommended annually. Here is a list of your current Health Maintenance items with a due date: There are no preventive care reminders to display for this patient. Patient Instructions History Introducing [facility]! Darren Hernandez introduces [employer] patient portal. Now you can access parts of your medical record, email your doctor's office, and request medication refills online. 1. In your internet browser, go to [email or web address] 2. Click on the First Time User? Click Here link in the Sign In box. You will see the New Member Sign Up page. 3. Enter your [employer] Access Code exactly as it appears below. You will not need to use this code after youve completed the sign-up process. If you do not sign up before the expiration date, you must request a new code. · [employer] Access Code: [email or web address] Expires: 7/24/2018 10:03 AM 
 
4. Enter the last four digits of your Social Security Number (xxxx) and Date of Birth (mm/dd/yyyy) as indicated and click Submit. You will be taken to the next sign-up page. 5. Create a [employer] ID. This will be your [employer] login ID and cannot be changed, so think of one that is secure and easy to remember. 6. Create a [employer] password. You can change your password at any time. 7. Enter your Password Reset Question and Answer. This can be used at a later time if you forget your password. 8. Enter your e-mail address. You will receive e-mail notification when new information is available in [address] 9. Click Sign Up. You can now view and download portions of your medical record. 10. Click the Download Summary menu link to download a portable copy of your medical information. If you have questions, please visit the Frequently Asked Questions section of the [employer] website. Remember, [employer] is NOT to be used for urgent needs. For medical emergencies, dial 911. Now available from your iPhone and Android! Please provide this summary of care documentation to your next provider. Your primary care clinician is listed as Lorne Quesada.  If you have any questions after today's visit, please call [phone].

## 2018-04-25 NOTE — PROGRESS NOTES
Discussed the patient's BMI with her. The BMI follow up plan is as follows:     dietary management education, guidance, and counseling  encourage exercise  monitor weight  prescribed dietary intake    An After Visit Summary was printed and given to the patient. This is the Subsequent Medicare Annual Wellness Exam, performed 12 months or more after the Initial AWV or the last Subsequent AWV    I have reviewed the patient's medical history in detail and updated the computerized patient record. History     Past Medical History:   Diagnosis Date    Asthma     Diabetes ([facility]     Hypertension     Vaginitis due to Candida albicans 4/3/2014      Past Surgical History:   Procedure Laterality Date    HX BREAST BIOPSY      x 3-4    HX  SECTION      x2    HX TUBAL LIGATION       Current Outpatient Prescriptions   Medication Sig Dispense Refill    hydroxychloroquine (PLAQUENIL) 200 mg tablet Take 1 Tab by mouth two (2) times a day. 60 Tab 3    estradiol (ESTRACE) 0.01 % (0.1 mg/gram) vaginal cream Insert 0.5 gram into vagina nightly for 2 weeks, then use two times a week. 42.5 g 3    albuterol (PROVENTIL VENTOLIN) 2.5 mg /3 mL (0.083 %) nebulizer solution USE ONE VIAL IN NEBULIZER EVERY 4 HOURS AS NEEDED FOR WHEEZING 150 Each 3    pioglitazone (ACTOS) 15 mg tablet TAKE ONE TABLET BY MOUTH ONCE DAILY WITH  DINNER 90 Tab 1    omeprazole (PRILOSEC) 40 mg capsule TAKE ONE CAPSULE BY MOUTH ONCE DAILY 90 Cap 0    omeprazole (PRILOSEC) 40 mg capsule Take 1 Cap by mouth daily. 90 Cap 3    glucose blood VI test strips (ACCU-CHEK SMARTVIEW TEST STRIP) strip by Does Not Apply route See Admin Instructions. Test three times daily to follow hypoglycemia, labile glucoses 270 Strip 0    fluticasone-salmeterol (ADVAIR DISKUS) 500-50 mcg/dose diskus inhaler Take 1 Puff by inhalation two (2) times a day. 60 Each 3    zolpidem (AMBIEN) 10 mg tablet Take 1 Tab by mouth nightly as needed for Sleep.  Max Daily Amount: 10 mg. 30 Tab 2    Lancets (LANCETS,ULTRA THIN) misc Test daily 102 Each 3    albuterol (VENTOLIN HFA) 90 mcg/actuation inhaler Take 2 Puffs by inhalation every six (6) hours as needed for Wheezing. 1 Inhaler 11    ipratropium (ATROVENT) 0.02 % soln 2.5 mL by Nebulization route every four (4) hours as needed. 150 mL 3    albuterol sulfate (PROVENTIL;VENTOLIN) 2.5 mg/0.5 mL nebu nebulizer solution 0.5 mL by Nebulization route every four (4) hours as needed for Wheezing. 150 mL 3    cloNIDine HCl (CATAPRES) 0.1 mg tablet 1 in AM, 2 in  Tab 3    losartan (COZAAR) 100 mg tablet Take 1 Tab by mouth daily. 90 Tab 3    metFORMIN (GLUCOPHAGE) 1,000 mg tablet Take 1 Tab by mouth two (2) times daily (with meals). 180 Tab 3    meclizine (ANTIVERT) 25 mg tablet Take 1 Tab by mouth three (3) times daily as needed for Dizziness. 90 Tab 1    albuterol (ACCUNEB) 1.25 mg/3 mL nebu 3 mL by Nebulization route every four (4) hours as needed. 540 mL 3    glucose blood VI test strips (ACCU-CHEK COMFORT CURVE TEST) strip Test blood sugar bid and prn for diagnosis 250.00 100 Strip 3    calcium-cholecalciferol, d3, (CALCIUM 600 + D) 600-125 mg-unit tab Take  by mouth.  cholecalciferol (VITAMIN D3) 1,000 unit tablet Take  by mouth daily.       OTHER accuchek comfort curve monitor kit 1 Device 0     Allergies   Allergen Reactions    Cefdinir Rash     Family History   Problem Relation Age of Onset    Diabetes Mother     Hypertension Mother    [facility] Diabetes Father     Hypertension Father     Diabetes Sister      Social History   Substance Use Topics    Smoking status: Former Smoker    Smokeless tobacco: Never Used    Alcohol use No     Patient Active Problem List   Diagnosis Code    Moderate persistent asthma without complication Y60.89    Controlled type 2 diabetes mellitus without complication, without long-term current use of insulin ([facility]) E11.9    Essential hypertension, benign I10    Rheumatoid arthritis of multiple sites with negative rheumatoid factor (HCC) M06.09    Chronic insomnia F51.04       Depression Risk Factor Screening:     PHQ over the last two weeks 4/25/2018   Little interest or pleasure in doing things Not at all   Feeling down, depressed or hopeless Not at all   Total Score PHQ 2 0     Alcohol Risk Factor Screening: You do not drink alcohol or very rarely. Functional Ability and Level of Safety:   Hearing Loss  The patient needs further evaluation. Activities of Daily Living  The home contains: no safety equipment. Patient does total self care    Fall Risk  Fall Risk Assessment, last 12 mths 4/25/2018   Able to walk? Yes   Fall in past 12 months? No       Abuse Screen  Patient is not abused    Cognitive Screening   Evaluation of Cognitive Function:  Has your family/caregiver stated any concerns about your memory: no  Normal    Patient Care Team   Patient Care Team:  Gordo Meehan MD as PCP - General (Internal Medicine)  Rea Pa RN as Ambulatory Care Navigator  Love Tanner LPN as Ambulatory Care Navigator    Assessment/Plan   Education and counseling provided:  Are appropriate based on today's review and evaluation  End-of-Life planning (with patient's consent)  Pneumococcal Vaccine  Influenza Vaccine  Screening Mammography  Screening Pap and pelvic (covered once every 2 years)  Colorectal cancer screening tests  Bone mass measurement (DEXA)  Screening for glaucoma  Diabetes screening test    Colonoscopy due  Bone density may be due  All else UTD  There are no preventive care reminders to display for this patient.

## 2018-04-25 NOTE — PROGRESS NOTES
HISTORY OF PRESENT ILLNESS  Kwan Coronel is a 75 y.o. female. HPI  aodm stable  C/o headache, congestion x 3 weeks  Review of Systems   Respiratory: Positive for wheezing. Neurological: Positive for headaches. All other systems reviewed and are negative. Past Medical History:   Diagnosis Date    Asthma     Diabetes ([address]     Hypertension     Vaginitis due to Candida albicans 4/3/2014     Current Outpatient Prescriptions on File Prior to Visit   Medication Sig Dispense Refill    estradiol (ESTRACE) 0.01 % (0.1 mg/gram) vaginal cream Insert 0.5 gram into vagina nightly for 2 weeks, then use two times a week. 42.5 g 3    albuterol (PROVENTIL VENTOLIN) 2.5 mg /3 mL (0.083 %) nebulizer solution USE ONE VIAL IN NEBULIZER EVERY 4 HOURS AS NEEDED FOR WHEEZING 150 Each 3    pioglitazone (ACTOS) 15 mg tablet TAKE ONE TABLET BY MOUTH ONCE DAILY WITH  DINNER 90 Tab 1    omeprazole (PRILOSEC) 40 mg capsule TAKE ONE CAPSULE BY MOUTH ONCE DAILY 90 Cap 0    omeprazole (PRILOSEC) 40 mg capsule Take 1 Cap by mouth daily. 90 Cap 3    glucose blood VI test strips (ACCU-CHEK SMARTVIEW TEST STRIP) strip by Does Not Apply route See Admin Instructions. Test three times daily to follow hypoglycemia, labile glucoses 270 Strip 0    fluticasone-salmeterol (ADVAIR DISKUS) 500-50 mcg/dose diskus inhaler Take 1 Puff by inhalation two (2) times a day. 60 Each 3    zolpidem (AMBIEN) 10 mg tablet Take 1 Tab by mouth nightly as needed for Sleep. Max Daily Amount: 10 mg. 30 Tab 2    Lancets (LANCETS,ULTRA THIN) misc Test daily 102 Each 3    albuterol (VENTOLIN HFA) 90 mcg/actuation inhaler Take 2 Puffs by inhalation every six (6) hours as needed for Wheezing. 1 Inhaler 11    ipratropium (ATROVENT) 0.02 % soln 2.5 mL by Nebulization route every four (4) hours as needed.  150 mL 3    albuterol sulfate (PROVENTIL;VENTOLIN) 2.5 mg/0.5 mL nebu nebulizer solution 0.5 mL by Nebulization route every four (4) hours as needed for Wheezing. 150 mL 3    cloNIDine HCl (CATAPRES) 0.1 mg tablet 1 in AM, 2 in  Tab 3    losartan (COZAAR) 100 mg tablet Take 1 Tab by mouth daily. 90 Tab 3    metFORMIN (GLUCOPHAGE) 1,000 mg tablet Take 1 Tab by mouth two (2) times daily (with meals). 180 Tab 3    meclizine (ANTIVERT) 25 mg tablet Take 1 Tab by mouth three (3) times daily as needed for Dizziness. 90 Tab 1    albuterol (ACCUNEB) 1.25 mg/3 mL nebu 3 mL by Nebulization route every four (4) hours as needed. 540 mL 3    glucose blood VI test strips (ACCU-CHEK COMFORT CURVE TEST) strip Test blood sugar bid and prn for diagnosis 250.00 100 Strip 3    calcium-cholecalciferol, d3, (CALCIUM 600 + D) 600-125 mg-unit tab Take  by mouth.  cholecalciferol (VITAMIN D3) 1,000 unit tablet Take  by mouth daily.  OTHER accuchek comfort curve monitor kit 1 Device 0     No current facility-administered medications on file prior to visit. Visit Vitals    /88 (BP 1 Location: Right arm, BP Patient Position: Sitting)    Pulse 93    Temp 96.1 °F (35.6 °C) (Oral)    Resp 16    Ht 5' 2\" (1.575 m)    Wt 157 lb (71.2 kg)    SpO2 97%    BMI 28.72 kg/m2         Physical Exam   Constitutional: She appears well-developed and well-nourished. No distress. HENT:   Head: Normocephalic and atraumatic. Mouth/Throat: No oropharyngeal exudate. Tm dull x 2  Nasal mucosal edema  Tender R frontal sinus   Pulmonary/Chest: Effort normal. No respiratory distress. She has no wheezes. She has no rales. She exhibits no tenderness. Diminished breath sounds   Skin: She is not diaphoretic. Vitals reviewed.       ASSESSMENT and PLAN  acute sinusitis   Plan  zithromax  Fluticasone  Recheck as planned

## 2018-04-26 DIAGNOSIS — F51.04 CHRONIC INSOMNIA: ICD-10-CM

## 2018-04-26 RX ORDER — ZOLPIDEM TARTRATE 10 MG/1
TABLET ORAL
Qty: 30 TAB | Refills: 2 | OUTPATIENT
Start: 2018-04-26 | End: 2018-07-16 | Stop reason: SDUPTHER

## 2018-05-17 RX ORDER — METFORMIN HYDROCHLORIDE 1000 MG/1
TABLET ORAL
Qty: 180 TAB | Refills: 3 | Status: SHIPPED | OUTPATIENT
Start: 2018-05-17 | End: 2018-07-16 | Stop reason: SDUPTHER

## 2018-06-29 RX ORDER — IPRATROPIUM BROMIDE 0.5 MG/2.5ML
0.5 SOLUTION RESPIRATORY (INHALATION)
Qty: 150 ML | Refills: 3 | Status: SHIPPED | OUTPATIENT
Start: 2018-06-29 | End: 2018-06-29 | Stop reason: SDUPTHER

## 2018-06-29 RX ORDER — IPRATROPIUM BROMIDE 0.5 MG/2.5ML
0.5 SOLUTION RESPIRATORY (INHALATION)
Qty: 150 ML | Refills: 3 | Status: SHIPPED | OUTPATIENT
Start: 2018-06-29 | End: 2018-07-16 | Stop reason: SDUPTHER

## 2018-07-06 ENCOUNTER — HOSPITAL ENCOUNTER (OUTPATIENT)
Dept: LAB | Age: 68
Discharge: HOME | End: 2018-07-06

## 2018-07-06 ENCOUNTER — TELEPHONE (OUTPATIENT)
Dept: FAMILY MEDICINE CLINIC | Age: 68
End: 2018-07-06

## 2018-07-06 PROCEDURE — 99001 SPECIMEN HANDLING PT-LAB: CPT | Performed by: INTERNAL MEDICINE

## 2018-07-06 RX ORDER — ALBUTEROL SULFATE 0.83 MG/ML
2.5 SOLUTION RESPIRATORY (INHALATION)
Qty: 150 EACH | Refills: 3 | Status: SHIPPED | OUTPATIENT
Start: 2018-07-06 | End: 2018-07-16 | Stop reason: SDUPTHER

## 2018-07-06 NOTE — TELEPHONE ENCOUNTER
Pharmacy called for clarification on Albuterol that was sent over this AM. Needing to know if it was for inhaler or nebs. Advised we needed to refill nebs. Pharmacy stated that because patient has Medicare part B plan they cannot do a verbal order to change Rx, need new Rx sent over for albuterol nebs omitting Proventil; Ventolin from the name. Please advise.

## 2018-07-06 NOTE — TELEPHONE ENCOUNTER
Confirmation received by pharmacy but unable to fill because of how it reads. Unable to give verbal due to insurance. Pharmacist wants to to stat albuterol neb without Preventil in name.

## 2018-07-07 LAB
ALBUMIN SERPL-MCNC: 3.9 G/DL (ref 3.6–4.8)
ALBUMIN/CREAT UR: 78.9 MG/G CREAT (ref 0–30)
ALBUMIN/GLOB SERPL: 1.6 {RATIO} (ref 1.2–2.2)
ALP SERPL-CCNC: 71 IU/L (ref 39–117)
ALT SERPL-CCNC: 18 IU/L (ref 0–32)
AST SERPL-CCNC: 18 IU/L (ref 0–40)
BASOPHILS # BLD AUTO: 0 X10E3/UL (ref 0–0.2)
BASOPHILS NFR BLD AUTO: 0 %
BILIRUB SERPL-MCNC: 0.3 MG/DL (ref 0–1.2)
BUN SERPL-MCNC: 19 MG/DL (ref 8–27)
BUN/CREAT SERPL: 26 (ref 12–28)
CALCIUM SERPL-MCNC: 9.2 MG/DL (ref 8.7–10.3)
CHLORIDE SERPL-SCNC: 102 MMOL/L (ref 96–106)
CHOLEST SERPL-MCNC: 149 MG/DL (ref 100–199)
CO2 SERPL-SCNC: 25 MMOL/L (ref 20–29)
CREAT SERPL-MCNC: 0.74 MG/DL (ref 0.57–1)
CREAT UR-MCNC: 109 MG/DL
EOSINOPHIL # BLD AUTO: 0.1 X10E3/UL (ref 0–0.4)
EOSINOPHIL NFR BLD AUTO: 3 %
ERYTHROCYTE [DISTWIDTH] IN BLOOD BY AUTOMATED COUNT: 14.9 % (ref 12.3–15.4)
EST. AVERAGE GLUCOSE BLD GHB EST-MCNC: 154 MG/DL
GLOBULIN SER CALC-MCNC: 2.5 G/DL (ref 1.5–4.5)
GLUCOSE SERPL-MCNC: 138 MG/DL (ref 65–99)
HBA1C MFR BLD: 7 % (ref 4.8–5.6)
HCT VFR BLD AUTO: 29.5 % (ref 34–46.6)
HDLC SERPL-MCNC: 76 MG/DL
HGB BLD-MCNC: 9.5 G/DL (ref 11.1–15.9)
IMM GRANULOCYTES # BLD: 0 X10E3/UL (ref 0–0.1)
IMM GRANULOCYTES NFR BLD: 0 %
INTERPRETATION, 910389: NORMAL
LDLC SERPL CALC-MCNC: 59 MG/DL (ref 0–99)
LYMPHOCYTES # BLD AUTO: 1.2 X10E3/UL (ref 0.7–3.1)
LYMPHOCYTES NFR BLD AUTO: 30 %
Lab: NORMAL
MCH RBC QN AUTO: 27.9 PG (ref 26.6–33)
MCHC RBC AUTO-ENTMCNC: 32.2 G/DL (ref 31.5–35.7)
MCV RBC AUTO: 87 FL (ref 79–97)
MICROALBUMIN UR-MCNC: 86 UG/ML
MONOCYTES # BLD AUTO: 0.2 X10E3/UL (ref 0.1–0.9)
MONOCYTES NFR BLD AUTO: 6 %
NEUTROPHILS # BLD AUTO: 2.5 X10E3/UL (ref 1.4–7)
NEUTROPHILS NFR BLD AUTO: 61 %
PLATELET # BLD AUTO: 258 X10E3/UL (ref 150–379)
POTASSIUM SERPL-SCNC: 4.1 MMOL/L (ref 3.5–5.2)
PROT SERPL-MCNC: 6.4 G/DL (ref 6–8.5)
RBC # BLD AUTO: 3.41 X10E6/UL (ref 3.77–5.28)
SODIUM SERPL-SCNC: 142 MMOL/L (ref 134–144)
T4 FREE SERPL-MCNC: 1.39 NG/DL (ref 0.82–1.77)
TRIGL SERPL-MCNC: 70 MG/DL (ref 0–149)
TSH SERPL DL<=0.005 MIU/L-ACNC: 2.16 UIU/ML (ref 0.45–4.5)
VLDLC SERPL CALC-MCNC: 14 MG/DL (ref 5–40)
WBC # BLD AUTO: 4 X10E3/UL (ref 3.4–10.8)

## 2018-07-16 ENCOUNTER — OFFICE VISIT (OUTPATIENT)
Dept: FAMILY MEDICINE CLINIC | Age: 68
End: 2018-07-16

## 2018-07-16 VITALS
WEIGHT: 156 LBS | OXYGEN SATURATION: 98 % | BODY MASS INDEX: 28.71 KG/M2 | HEART RATE: 75 BPM | RESPIRATION RATE: 20 BRPM | DIASTOLIC BLOOD PRESSURE: 67 MMHG | HEIGHT: 62 IN | TEMPERATURE: 96.5 F | SYSTOLIC BLOOD PRESSURE: 145 MMHG

## 2018-07-16 DIAGNOSIS — J45.40 MODERATE PERSISTENT ASTHMA WITHOUT COMPLICATION: ICD-10-CM

## 2018-07-16 DIAGNOSIS — F40.240 CLAUSTROPHOBIA: ICD-10-CM

## 2018-07-16 DIAGNOSIS — G44.59 OTHER COMPLICATED HEADACHE SYNDROME: ICD-10-CM

## 2018-07-16 DIAGNOSIS — I10 ESSENTIAL HYPERTENSION, BENIGN: ICD-10-CM

## 2018-07-16 DIAGNOSIS — F51.04 CHRONIC INSOMNIA: ICD-10-CM

## 2018-07-16 DIAGNOSIS — E11.9 CONTROLLED TYPE 2 DIABETES MELLITUS WITHOUT COMPLICATION, WITHOUT LONG-TERM CURRENT USE OF INSULIN (HCC): Primary | ICD-10-CM

## 2018-07-16 PROBLEM — E11.21 TYPE 2 DIABETES WITH NEPHROPATHY (HCC): Status: ACTIVE | Noted: 2018-07-16

## 2018-07-16 RX ORDER — PIOGLITAZONEHYDROCHLORIDE 15 MG/1
15 TABLET ORAL DAILY
Qty: 90 TAB | Refills: 1 | Status: SHIPPED | OUTPATIENT
Start: 2018-07-16 | End: 2019-02-18 | Stop reason: SDUPTHER

## 2018-07-16 RX ORDER — CLONIDINE HYDROCHLORIDE 0.1 MG/1
TABLET ORAL
Qty: 270 TAB | Refills: 3 | Status: SHIPPED | OUTPATIENT
Start: 2018-07-16 | End: 2019-02-18 | Stop reason: SDUPTHER

## 2018-07-16 RX ORDER — IPRATROPIUM BROMIDE 0.5 MG/2.5ML
0.5 SOLUTION RESPIRATORY (INHALATION)
Qty: 150 ML | Refills: 3 | Status: SHIPPED | OUTPATIENT
Start: 2018-07-16

## 2018-07-16 RX ORDER — ALBUTEROL SULFATE 90 UG/1
2 AEROSOL, METERED RESPIRATORY (INHALATION)
Qty: 1 INHALER | Refills: 11 | Status: SHIPPED | OUTPATIENT
Start: 2018-07-16

## 2018-07-16 RX ORDER — FLUTICASONE PROPIONATE AND SALMETEROL 500; 50 UG/1; UG/1
1 POWDER RESPIRATORY (INHALATION) 2 TIMES DAILY
Qty: 60 EACH | Refills: 3 | Status: SHIPPED | OUTPATIENT
Start: 2018-07-16 | End: 2019-03-15 | Stop reason: SDUPTHER

## 2018-07-16 RX ORDER — METFORMIN HYDROCHLORIDE 1000 MG/1
1000 TABLET ORAL 2 TIMES DAILY WITH MEALS
Qty: 180 TAB | Refills: 3 | Status: SHIPPED | OUTPATIENT
Start: 2018-07-16 | End: 2019-02-18 | Stop reason: SDUPTHER

## 2018-07-16 RX ORDER — ZOLPIDEM TARTRATE 10 MG/1
10 TABLET ORAL
Qty: 30 TAB | Refills: 2 | Status: SHIPPED | OUTPATIENT
Start: 2018-07-16

## 2018-07-16 RX ORDER — OMEPRAZOLE 40 MG/1
40 CAPSULE, DELAYED RELEASE ORAL DAILY
Qty: 90 CAP | Refills: 3 | Status: SHIPPED | OUTPATIENT
Start: 2018-07-16

## 2018-07-16 RX ORDER — ALBUTEROL SULFATE 0.83 MG/ML
2.5 SOLUTION RESPIRATORY (INHALATION)
Qty: 150 EACH | Refills: 3 | Status: SHIPPED | OUTPATIENT
Start: 2018-07-16 | End: 2019-03-25 | Stop reason: RX

## 2018-07-16 RX ORDER — LOSARTAN POTASSIUM 100 MG/1
100 TABLET ORAL DAILY
Qty: 90 TAB | Refills: 3 | Status: SHIPPED | OUTPATIENT
Start: 2018-07-16 | End: 2019-02-18 | Stop reason: SDUPTHER

## 2018-07-16 RX ORDER — FLUTICASONE PROPIONATE 50 MCG
2 SPRAY, SUSPENSION (ML) NASAL DAILY
Qty: 1 BOTTLE | Refills: 3 | Status: SHIPPED | OUTPATIENT
Start: 2018-07-16

## 2018-07-16 RX ORDER — ESTRADIOL 0.1 MG/G
CREAM VAGINAL
Qty: 42.5 G | Refills: 3 | Status: SHIPPED | OUTPATIENT
Start: 2018-07-16 | End: 2018-07-23 | Stop reason: SDUPTHER

## 2018-07-16 RX ORDER — DIAZEPAM 10 MG/1
TABLET ORAL
Qty: 1 TAB | Refills: 0 | Status: ON HOLD | OUTPATIENT
Start: 2018-07-16 | End: 2020-11-18

## 2018-07-16 RX ORDER — MECLIZINE HYDROCHLORIDE 25 MG/1
25 TABLET ORAL
Qty: 90 TAB | Refills: 1 | Status: ON HOLD | OUTPATIENT
Start: 2018-07-16 | End: 2020-11-18

## 2018-07-16 NOTE — PROGRESS NOTES
HISTORY OF PRESENT ILLNESS  María Alfaro is a 75 y.o. female. HPI  aodm stable asthma stable  C/o headaches x 2 months  r sided  With nocturnal awakening  No sinus congestion   Not a/w visual or GI disturbance  Review of Systems   Neurological: Positive for headaches. All other systems reviewed and are negative. Past Medical History:   Diagnosis Date    Asthma     Diabetes ([facility]     Hypertension     Vaginitis due to Candida albicans 4/3/2014     Current Outpatient Prescriptions on File Prior to Visit   Medication Sig Dispense Refill    albuterol (PROVENTIL VENTOLIN) 2.5 mg /3 mL (0.083 %) nebulizer solution 3 mL by Nebulization route every four (4) hours as needed for Wheezing. J45.40 150 Each 3    ipratropium (ATROVENT) 0.02 % soln 2.5 mL by Nebulization route every four (4) hours as needed. J45.40 150 mL 3    metFORMIN (GLUCOPHAGE) 1,000 mg tablet TAKE ONE TABLET BY MOUTH TWICE DAILY 180 Tab 3    zolpidem (AMBIEN) 10 mg tablet TAKE ONE TABLET BY MOUTH NIGHTLY AS NEEDED FOR SLEEP. MAX. DAILY AMOUNT: 10MG 30 Tab 2    hydroxychloroquine (PLAQUENIL) 200 mg tablet Take 1 Tab by mouth two (2) times a day. 60 Tab 3    fluticasone (FLONASE) 50 mcg/actuation nasal spray 2 Sprays by Both Nostrils route daily. 1 Bottle 3    estradiol (ESTRACE) 0.01 % (0.1 mg/gram) vaginal cream Insert 0.5 gram into vagina nightly for 2 weeks, then use two times a week. 42.5 g 3    pioglitazone (ACTOS) 15 mg tablet TAKE ONE TABLET BY MOUTH ONCE DAILY WITH  DINNER 90 Tab 1    omeprazole (PRILOSEC) 40 mg capsule TAKE ONE CAPSULE BY MOUTH ONCE DAILY 90 Cap 0    omeprazole (PRILOSEC) 40 mg capsule Take 1 Cap by mouth daily. 90 Cap 3    glucose blood VI test strips (ACCU-CHEK SMARTVIEW TEST STRIP) strip by Does Not Apply route See Admin Instructions.  Test three times daily to follow hypoglycemia, labile glucoses 270 Strip 0    fluticasone-salmeterol (ADVAIR DISKUS) 500-50 mcg/dose diskus inhaler Take 1 Puff by inhalation two (2) times a day. 60 Each 3    Lancets (LANCETS,ULTRA THIN) misc Test daily 102 Each 3    albuterol (VENTOLIN HFA) 90 mcg/actuation inhaler Take 2 Puffs by inhalation every six (6) hours as needed for Wheezing. 1 Inhaler 11    albuterol sulfate (PROVENTIL;VENTOLIN) 2.5 mg/0.5 mL nebu nebulizer solution 0.5 mL by Nebulization route every four (4) hours as needed for Wheezing. 150 mL 3    cloNIDine HCl (CATAPRES) 0.1 mg tablet 1 in AM, 2 in  Tab 3    losartan (COZAAR) 100 mg tablet Take 1 Tab by mouth daily. 90 Tab 3    meclizine (ANTIVERT) 25 mg tablet Take 1 Tab by mouth three (3) times daily as needed for Dizziness. 90 Tab 1    albuterol (ACCUNEB) 1.25 mg/3 mL nebu 3 mL by Nebulization route every four (4) hours as needed. 540 mL 3    glucose blood VI test strips (ACCU-CHEK COMFORT CURVE TEST) strip Test blood sugar bid and prn for diagnosis 250.00 100 Strip 3    calcium-cholecalciferol, d3, (CALCIUM 600 + D) 600-125 mg-unit tab Take  by mouth.  cholecalciferol (VITAMIN D3) 1,000 unit tablet Take  by mouth daily.  OTHER accuchek comfort curve monitor kit 1 Device 0     No current facility-administered medications on file prior to visit. Visit Vitals    /67 (BP 1 Location: Right arm, BP Patient Position: Sitting)    Pulse 75    Temp 96.5 °F (35.8 °C) (Oral)    Resp 20    Ht 5' 2\" (1.575 m)    Wt 156 lb (70.8 kg)    SpO2 98%    BMI 28.53 kg/m2         Physical Exam   Constitutional: She appears well-developed and well-nourished. No distress. HENT:   Head: Normocephalic and atraumatic. Mouth/Throat: No oropharyngeal exudate. Mild congestion   Eyes: EOM are normal. Pupils are equal, round, and reactive to light. No scleral icterus. Neck: Normal range of motion. Neck supple. No thyromegaly present. - spinal tenderness  - tmj tenderness   Lymphadenopathy:     She has no cervical adenopathy. Skin: She is not diaphoretic. Vitals reviewed.   reviewed labs  hgb 9.5 (old)   A1C 7.2  ASSESSMENT and PLAN  aodm   Headache  Plan  Refills  MRI brain

## 2018-07-16 NOTE — PROGRESS NOTES
Gretchen Slade is a 75 y.o. female (: 1950) presenting to address:    Chief Complaint   Patient presents with    Diabetes     pain scale 0/10       Vitals:    18 1323   BP: 145/67   Pulse: 75   Resp: 20   Temp: 96.5 °F (35.8 °C)   TempSrc: Oral   SpO2: 98%   Weight: 156 lb (70.8 kg)   Height: 5' 2\" (1.575 m)   PainSc:   0 - No pain       Hearing/Vision:   No exam data present    Learning Assessment:     Learning Assessment 2015   PRIMARY LEARNER Patient   HIGHEST LEVEL OF EDUCATION - PRIMARY LEARNER  DID NOT GRADUATE HIGH SCHOOL   BARRIERS PRIMARY LEARNER NONE   CO-LEARNER CAREGIVER No   PRIMARY LANGUAGE ENGLISH    NEED -   LEARNER PREFERENCE PRIMARY OTHER (COMMENT)   ANSWERED BY patient   RELATIONSHIP SELF     Depression Screening:     PHQ over the last two weeks 2018   Little interest or pleasure in doing things Not at all   Feeling down, depressed or hopeless Not at all   Total Score PHQ 2 0     Fall Risk Assessment:     Fall Risk Assessment, last 12 mths 2018   Able to walk? Yes   Fall in past 12 months? No     Abuse Screening:     Abuse Screening Questionnaire 2018   Do you ever feel afraid of your partner? N   Are you in a relationship with someone who physically or mentally threatens you? N   Is it safe for you to go home? Y     Coordination of Care Questionaire:   1. Have you been to the ER, urgent care clinic since your last visit? Hospitalized since your last visit? NO    2. Have you seen or consulted any other health care providers outside of the [facility] since your last visit? Include any pap smears or colon screening. YES Dr. Nic Marin    Advanced Directive:   1. Do you have an Advanced Directive? NO    2. Would you like information on Advanced Directives?  NO

## 2018-07-16 NOTE — MR AVS SNAPSHOT
[address] [facility] 
 
 
 [address] [facility] [id number] [phone] Patient: Lon Mayorga MRN: [id number] :1950 Visit Information Date & Time Provider Department Dept. Phone Encounter #  
 2018  1:30 PM Haja Anthony, 3 [facility] [phone] [id number] Follow-up Instructions Return pending results. Follow-up and Disposition History Your Appointments 2018  8:00 AM  
Office Visit with Socorro Chaidez DO [address] [place] [address],B-1 [facility] Appt Note: 3month follow up [address] [facility] [address] [facility] [id number]  
[phone]  
  
   
 [address] [place] [address] Upcoming Health Maintenance Date Due  
 EYE EXAM RETINAL OR DILATED Q1 10/7/2018* Influenza Age 5 to Adult 2018 HEMOGLOBIN A1C Q6M 2019 FOOT EXAM Q1 2019 MEDICARE YEARLY EXAM 2019 MICROALBUMIN Q1 2019 LIPID PANEL Q1 2019 BREAST CANCER SCRN MAMMOGRAM 8/15/2019 GLAUCOMA SCREENING Q2Y 3/23/2020 COLONOSCOPY 10/21/2024 DTaP/Tdap/Td series (2 - Td) 3/7/2027 *Topic was postponed. The date shown is not the original due date. Allergies as of 2018  Review Complete On: 2018 By: Haja Anthony MD  
  
 Severity Noted Reaction Type Reactions Cefdinir  2015    Rash Current Immunizations  Reviewed on 10/21/2014 Name Date Influenza High Dose Vaccine PF 2017, 2015 Influenza Vaccine 10/21/2014  2:56 PM  
  
 Not reviewed this visit You Were Diagnosed With   
  
 Codes Comments Controlled type 2 diabetes mellitus without complication, without long-term current use of insulin ([facility]    -  Primary ICD-10-CM: E11.9 ICD-9-CM: 250.00 Chronic insomnia     ICD-10-CM: F51.04 
ICD-9-CM: 780.52 Moderate persistent asthma without complication     XSJ-55-DF: J45.40 ICD-9-CM: 493.90 Essential hypertension, benign     ICD-10-CM: I10 
ICD-9-CM: 401.1 Other complicated headache syndrome     ICD-10-CM: G44.59 
ICD-9-CM: 339.44 Claustrophobia     ICD-10-CM: F40.240 ICD-9-CM: 300.29 Vitals BP Pulse Temp Resp Height(growth percentile) Weight(growth percentile) 145/67 (BP 1 Location: Right arm, BP Patient Position: Sitting) 75 96.5 °F (35.8 °C) (Oral) 20 5' 2\" (1.575 m) 156 lb (70.8 kg) SpO2 BMI OB Status Smoking Status 98% 28.53 kg/m2 Postmenopausal Former Smoker BMI and BSA Data Body Mass Index Body Surface Area 28.53 kg/m 2 1.76 m 2 Preferred Pharmacy Pharmacy Name Phone [address] [facility] [address], [address] [place] [address],[address] [facility] [phone] Your Updated Medication List  
  
   
This list is accurate as of 7/16/18  2:15 PM.  Always use your most recent med list.  
  
  
  
  
 * albuterol 1.25 mg/3 mL Nebu Commonly known as:  ACCUNEB  
3 mL by Nebulization route every four (4) hours as needed. * albuterol sulfate 2.5 mg/0.5 mL Nebu nebulizer solution Commonly known as:  PROVENTIL;VENTOLIN  
0.5 mL by Nebulization route every four (4) hours as needed for Wheezing. * albuterol 2.5 mg /3 mL (0.083 %) nebulizer solution Commonly known as:  PROVENTIL VENTOLIN  
3 mL by Nebulization route every four (4) hours as needed for Wheezing. J45.40 * albuterol 90 mcg/actuation inhaler Commonly known as:  VENTOLIN HFA Take 2 Puffs by inhalation every six (6) hours as needed for Wheezing. CALCIUM 600 + D 600-125 mg-unit Tab Generic drug:  calcium-cholecalciferol (d3) Take  by mouth.  
  
 cloNIDine HCl 0.1 mg tablet Commonly known as:  CATAPRES  
1 in AM, 2 in PM  
  
 diazePAM 10 mg tablet Commonly known as:  VALIUM Take 1 hour before MRI scan  
  
 estradiol 0.01 % (0.1 mg/gram) vaginal cream  
Commonly known as:  ESTRACE  
 Insert 0.5 gram into vagina nightly for 2 weeks, then use two times a week. fluticasone 50 mcg/actuation nasal spray Commonly known as:  Euna Virgilio 2 Sprays by Both Nostrils route daily. fluticasone-salmeterol 500-50 mcg/dose diskus inhaler Commonly known as:  ADVAIR DISKUS Take 1 Puff by inhalation two (2) times a day. * glucose blood VI test strips strip Commonly known as:  ACCU-CHEK COMFORT CURVE TEST Test blood sugar bid and prn for diagnosis 250.00  
  
 * glucose blood VI test strips strip Commonly known as:  ACCU-CHEK SMARTVIEW TEST STRIP  
by Does Not Apply route See Admin Instructions. Test three times daily to follow hypoglycemia, labile glucoses  
  
 hydroxychloroquine 200 mg tablet Commonly known as:  PLAQUENIL Take 1 Tab by mouth two (2) times a day. ipratropium 0.02 % Soln Commonly known as:  ATROVENT  
2.5 mL by Nebulization route every four (4) hours as needed. J45.40 Lancets Misc Commonly known as:  Josias Kipper Test daily  
  
 losartan 100 mg tablet Commonly known as:  COZAAR Take 1 Tab by mouth daily. meclizine 25 mg tablet Commonly known as:  ANTIVERT Take 1 Tab by mouth three (3) times daily as needed for Dizziness. metFORMIN 1,000 mg tablet Commonly known as:  GLUCOPHAGE Take 1 Tab by mouth two (2) times daily (with meals). * omeprazole 40 mg capsule Commonly known as:  PRILOSEC  
TAKE ONE CAPSULE BY MOUTH ONCE DAILY  
  
 * omeprazole 40 mg capsule Commonly known as:  PRILOSEC Take 1 Cap by mouth daily. OTHER  
accuchek comfort curve monitor kit  
  
 pioglitazone 15 mg tablet Commonly known as:  ACTOS Take 1 Tab by mouth daily. VITAMIN D3 1,000 unit tablet Generic drug:  cholecalciferol Take  by mouth daily. zolpidem 10 mg tablet Commonly known as:  AMBIEN Take 1 Tab by mouth nightly as needed for Sleep. Max Daily Amount: 10 mg. * Notice: This list has 8 medication(s) that are the same as other medications prescribed for you. Read the directions carefully, and ask your doctor or other care provider to review them with you. Prescriptions Printed Refills  
 albuterol (PROVENTIL VENTOLIN) 2.5 mg /3 mL (0.083 %) nebulizer solution 3 Sig: 3 mL by Nebulization route every four (4) hours as needed for Wheezing. J45.40 Class: Print Route: Nebulization  
 ipratropium (ATROVENT) 0.02 % soln 3 Si.5 mL by Nebulization route every four (4) hours as needed. J45.40 Class: Print Route: Nebulization  
 metFORMIN (GLUCOPHAGE) 1,000 mg tablet 3 Sig: Take 1 Tab by mouth two (2) times daily (with meals). Class: Print Route: Oral  
 zolpidem (AMBIEN) 10 mg tablet 2 Sig: Take 1 Tab by mouth nightly as needed for Sleep. Max Daily Amount: 10 mg.  
 Class: Print Route: Oral  
 fluticasone (FLONASE) 50 mcg/actuation nasal spray 3 Si Sprays by Both Nostrils route daily. Class: Print Route: Both Nostrils  
 estradiol (ESTRACE) 0.01 % (0.1 mg/gram) vaginal cream 3 Sig: Insert 0.5 gram into vagina nightly for 2 weeks, then use two times a week. Class: Print  
 pioglitazone (ACTOS) 15 mg tablet 1 Sig: Take 1 Tab by mouth daily. Class: Print Route: Oral  
 omeprazole (PRILOSEC) 40 mg capsule 3 Sig: Take 1 Cap by mouth daily. Class: Print Route: Oral  
 fluticasone-salmeterol (ADVAIR DISKUS) 500-50 mcg/dose diskus inhaler 3 Sig: Take 1 Puff by inhalation two (2) times a day. Class: Print Route: Inhalation  
 albuterol (VENTOLIN HFA) 90 mcg/actuation inhaler 11 Sig: Take 2 Puffs by inhalation every six (6) hours as needed for Wheezing. Class: Print Route: Inhalation  
 cloNIDine HCl (CATAPRES) 0.1 mg tablet 3 Si in AM, 2 in PM  
 Class: Print  
 losartan (COZAAR) 100 mg tablet 3 Sig: Take 1 Tab by mouth daily. Class: Print  Route: Oral  
 meclizine (ANTIVERT) 25 mg tablet 1 Sig: Take 1 Tab by mouth three (3) times daily as needed for Dizziness. Class: Print Route: Oral  
 diazePAM (VALIUM) 10 mg tablet 0 Sig: Take 1 hour before MRI scan Class: Print Follow-up Instructions Return pending results. To-Do List   
 07/16/2018 Imaging:  MRI BRAIN W WO CONT Referral Information Referral ID Referred By Referred To  
  
 [id number] Abdon Meehan Not Available Visits Status Start Date End Date 1 New Request 7/16/18 7/16/19 If your referral has a status of pending review or denied, additional information will be sent to support the outcome of this decision. Introducing [facility]! Stone Finley introduces PathJump patient portal. Now you can access parts of your medical record, email your doctor's office, and request medication refills online. 1. In your internet browser, go to [email or web address] 2. Click on the First Time User? Click Here link in the Sign In box. You will see the New Member Sign Up page. 3. Enter your PathJump Access Code exactly as it appears below. You will not need to use this code after youve completed the sign-up process. If you do not sign up before the expiration date, you must request a new code. · PathJump Access Code: [email or web address] Expires: 7/24/2018 10:03 AM 
 
4. Enter the last four digits of your Social Security Number (xxxx) and Date of Birth (mm/dd/yyyy) as indicated and click Submit. You will be taken to the next sign-up page. 5. Create a [email or web address] ID. This will be your PathJump login ID and cannot be changed, so think of one that is secure and easy to remember. 6. Create a PathJump password. You can change your password at any time. 7. Enter your Password Reset Question and Answer. This can be used at a later time if you forget your password. 8. Enter your e-mail address.  You will receive e-mail notification when new information is available in [employer]. 9. Click Sign Up. You can now view and download portions of your medical record. 10. Click the Download Summary menu link to download a portable copy of your medical information. If you have questions, please visit the Frequently Asked Questions section of the [employer] website. Remember, [employer] is NOT to be used for urgent needs. For medical emergencies, dial 911. Now available from your iPhone and Android! Please provide this summary of care documentation to your next provider. Your primary care clinician is listed as Julee Iniguez. If you have any questions after today's visit, please call [phone].

## 2018-07-18 DIAGNOSIS — R51.9 NONINTRACTABLE HEADACHE, UNSPECIFIED CHRONICITY PATTERN, UNSPECIFIED HEADACHE TYPE: Primary | ICD-10-CM

## 2018-07-23 ENCOUNTER — OFFICE VISIT (OUTPATIENT)
Dept: OBGYN CLINIC | Age: 68
End: 2018-07-23

## 2018-07-23 VITALS — BODY MASS INDEX: 28.52 KG/M2 | HEIGHT: 62 IN | WEIGHT: 155 LBS | OXYGEN SATURATION: 98 % | RESPIRATION RATE: 18 BRPM

## 2018-07-23 DIAGNOSIS — N95.2 ATROPHIC VAGINITIS: Primary | ICD-10-CM

## 2018-07-23 DIAGNOSIS — Z12.39 BREAST SCREENING: ICD-10-CM

## 2018-07-23 DIAGNOSIS — N94.10 DYSPAREUNIA, FEMALE: ICD-10-CM

## 2018-07-23 RX ORDER — ESTRADIOL 0.1 MG/G
CREAM VAGINAL
Qty: 42.5 G | Refills: 3 | Status: SHIPPED | OUTPATIENT
Start: 2018-07-23

## 2018-07-23 NOTE — PROGRESS NOTES
Subjective:      Patient presents for follow up of atrophic vaginitis and dyspareunia. She was last seen 3 months ago, at which time she was prescribed Estrace cream. She states that she fells her vaginal dryness and sensitivity have improved, but she has not had intercourse because she lacks desire. She states that this is OK with her, and she is happy with her response to the medication. She has no other complaints, but a review of records shows that she is due for a mammogram next month. Current Outpatient Prescriptions   Medication Sig Dispense Refill    estradiol (ESTRACE) 0.01 % (0.1 mg/gram) vaginal cream Insert 0.5 gram into vagina two times a week. 42.5 g 3    albuterol (PROVENTIL VENTOLIN) 2.5 mg /3 mL (0.083 %) nebulizer solution 3 mL by Nebulization route every four (4) hours as needed for Wheezing. J45.40 150 Each 3    ipratropium (ATROVENT) 0.02 % soln 2.5 mL by Nebulization route every four (4) hours as needed. J45.40 150 mL 3    metFORMIN (GLUCOPHAGE) 1,000 mg tablet Take 1 Tab by mouth two (2) times daily (with meals). 180 Tab 3    zolpidem (AMBIEN) 10 mg tablet Take 1 Tab by mouth nightly as needed for Sleep. Max Daily Amount: 10 mg. 30 Tab 2    fluticasone (FLONASE) 50 mcg/actuation nasal spray 2 Sprays by Both Nostrils route daily. 1 Bottle 3    pioglitazone (ACTOS) 15 mg tablet Take 1 Tab by mouth daily. 90 Tab 1    omeprazole (PRILOSEC) 40 mg capsule Take 1 Cap by mouth daily. 90 Cap 3    fluticasone-salmeterol (ADVAIR DISKUS) 500-50 mcg/dose diskus inhaler Take 1 Puff by inhalation two (2) times a day. 60 Each 3    albuterol (VENTOLIN HFA) 90 mcg/actuation inhaler Take 2 Puffs by inhalation every six (6) hours as needed for Wheezing. 1 Inhaler 11    cloNIDine HCl (CATAPRES) 0.1 mg tablet 1 in AM, 2 in  Tab 3    losartan (COZAAR) 100 mg tablet Take 1 Tab by mouth daily.  90 Tab 3    meclizine (ANTIVERT) 25 mg tablet Take 1 Tab by mouth three (3) times daily as needed for Dizziness. 90 Tab 1    diazePAM (VALIUM) 10 mg tablet Take 1 hour before MRI scan 1 Tab 0    hydroxychloroquine (PLAQUENIL) 200 mg tablet Take 1 Tab by mouth two (2) times a day. 60 Tab 3    omeprazole (PRILOSEC) 40 mg capsule TAKE ONE CAPSULE BY MOUTH ONCE DAILY 90 Cap 0    glucose blood VI test strips (ACCU-CHEK SMARTVIEW TEST STRIP) strip by Does Not Apply route See Admin Instructions. Test three times daily to follow hypoglycemia, labile glucoses 270 Strip 0    Lancets (LANCETS,ULTRA THIN) misc Test daily 102 Each 3    albuterol sulfate (PROVENTIL;VENTOLIN) 2.5 mg/0.5 mL nebu nebulizer solution 0.5 mL by Nebulization route every four (4) hours as needed for Wheezing. 150 mL 3    albuterol (ACCUNEB) 1.25 mg/3 mL nebu 3 mL by Nebulization route every four (4) hours as needed. 540 mL 3    glucose blood VI test strips (ACCU-CHEK COMFORT CURVE TEST) strip Test blood sugar bid and prn for diagnosis 250.00 100 Strip 3    calcium-cholecalciferol, d3, (CALCIUM 600 + D) 600-125 mg-unit tab Take  by mouth.  cholecalciferol (VITAMIN D3) 1,000 unit tablet Take  by mouth daily.  OTHER accuchek comfort curve monitor kit 1 Device 0     Allergies   Allergen Reactions    Cefdinir Rash     Past Medical History:   Diagnosis Date    Asthma     Diabetes ([address]     Hypertension     Vaginitis due to Candida albicans 4/3/2014     Past Surgical History:   Procedure Laterality Date    HX BREAST BIOPSY      x 3-4    HX  SECTION      x2    HX TUBAL LIGATION       Family History   Problem Relation Age of Onset    Diabetes Mother     Hypertension Mother     Diabetes Father     Hypertension Father     Diabetes Sister      Social History   Substance Use Topics    Smoking status: Former Smoker    Smokeless tobacco: Never Used    Alcohol use No      Review of Systems    A comprehensive review of systems was negative except for that written in the HPI.        Objective:     Visit Vitals    Resp 18    Ht 5' 2\" (1.575 m)    Wt 155 lb (70.3 kg)    SpO2 98%    BMI 28.35 kg/m2     General appearance: alert, cooperative, no distress, appears stated age  Exam deferred       Assessment/Plan:     Atrophic vaginitis, improved with Estrace cream.  Need for screening mammogram, order written. Follow up prn. Plan of care discussed. Patient expressed understanding. The entirety of the 15 minute visit was spent in counseling.

## 2018-08-17 DIAGNOSIS — G44.59 OTHER COMPLICATED HEADACHE SYNDROME: ICD-10-CM

## 2018-11-26 RX ORDER — PIOGLITAZONEHYDROCHLORIDE 15 MG/1
TABLET ORAL
Qty: 90 TAB | Refills: 1 | OUTPATIENT
Start: 2018-11-26

## 2018-11-26 NOTE — TELEPHONE ENCOUNTER
No further refills will be authorized. Dr. Shi Galan no longer at this practice. Pt needs to establish care with new PCP.

## 2019-02-18 ENCOUNTER — OFFICE VISIT (OUTPATIENT)
Dept: FAMILY MEDICINE CLINIC | Age: 69
End: 2019-02-18

## 2019-02-18 VITALS
DIASTOLIC BLOOD PRESSURE: 86 MMHG | SYSTOLIC BLOOD PRESSURE: 144 MMHG | OXYGEN SATURATION: 100 % | TEMPERATURE: 96.4 F | BODY MASS INDEX: 27.6 KG/M2 | HEART RATE: 95 BPM | RESPIRATION RATE: 16 BRPM | WEIGHT: 150 LBS | HEIGHT: 62 IN

## 2019-02-18 DIAGNOSIS — I10 ESSENTIAL HYPERTENSION, BENIGN: ICD-10-CM

## 2019-02-18 DIAGNOSIS — J45.30 MILD PERSISTENT ASTHMA WITHOUT COMPLICATION: ICD-10-CM

## 2019-02-18 DIAGNOSIS — E11.9 CONTROLLED TYPE 2 DIABETES MELLITUS WITHOUT COMPLICATION, WITHOUT LONG-TERM CURRENT USE OF INSULIN (HCC): Primary | ICD-10-CM

## 2019-02-18 LAB — HBA1C MFR BLD HPLC: 6.3 %

## 2019-02-18 RX ORDER — ALBUTEROL SULFATE 2.5 MG/.5ML
2.5 SOLUTION RESPIRATORY (INHALATION)
Qty: 150 ML | Refills: 3 | Status: SHIPPED | OUTPATIENT
Start: 2019-02-18 | End: 2019-03-25 | Stop reason: RX

## 2019-02-18 RX ORDER — DICLOFENAC SODIUM 75 MG/1
TABLET, DELAYED RELEASE ORAL
Status: ON HOLD | COMMUNITY
End: 2020-11-18

## 2019-02-18 RX ORDER — ALBUTEROL SULFATE 1.25 MG/3ML
1.25 SOLUTION RESPIRATORY (INHALATION)
Qty: 540 ML | Refills: 3 | Status: SHIPPED | OUTPATIENT
Start: 2019-02-18 | End: 2019-08-29 | Stop reason: ALTCHOICE

## 2019-02-18 RX ORDER — PIOGLITAZONEHYDROCHLORIDE 15 MG/1
15 TABLET ORAL DAILY
Qty: 90 TAB | Refills: 1 | Status: SHIPPED | OUTPATIENT
Start: 2019-02-18

## 2019-02-18 RX ORDER — LOSARTAN POTASSIUM 100 MG/1
100 TABLET ORAL DAILY
Qty: 90 TAB | Refills: 3 | Status: SHIPPED | OUTPATIENT
Start: 2019-02-18 | End: 2020-03-17

## 2019-02-18 RX ORDER — TRAZODONE HYDROCHLORIDE 50 MG/1
50 TABLET ORAL
Qty: 30 TAB | Refills: 0 | Status: SHIPPED | OUTPATIENT
Start: 2019-02-18 | End: 2019-04-15 | Stop reason: SDUPTHER

## 2019-02-18 RX ORDER — BISMUTH SUBSALICYLATE 262 MG
1 TABLET,CHEWABLE ORAL DAILY
COMMUNITY

## 2019-02-18 RX ORDER — METFORMIN HYDROCHLORIDE 1000 MG/1
1000 TABLET ORAL 2 TIMES DAILY WITH MEALS
Qty: 180 TAB | Refills: 3 | Status: SHIPPED | OUTPATIENT
Start: 2019-02-18

## 2019-02-18 RX ORDER — CLONIDINE HYDROCHLORIDE 0.1 MG/1
TABLET ORAL
Qty: 270 TAB | Refills: 3 | Status: SHIPPED | OUTPATIENT
Start: 2019-02-18

## 2019-02-18 NOTE — PATIENT INSTRUCTIONS
Diabetes Foot Health: Care Instructions  Your Care Instructions    When you have diabetes, your feet need extra care and attention. Diabetes can damage the nerve endings and blood vessels in your feet, making you less likely to notice when your feet are injured. Diabetes also limits your body's ability to fight infection and get blood to areas that need it. If you get a minor foot injury, it could become an ulcer or a serious infection. With good foot care, you can prevent most of these problems. Caring for your feet can be quick and easy. Most of the care can be done when you are bathing or getting ready for bed. Follow-up care is a key part of your treatment and safety. Be sure to make and go to all appointments, and call your doctor if you are having problems. It's also a good idea to know your test results and keep a list of the medicines you take. How can you care for yourself at home? · Keep your blood sugar close to normal by watching what and how much you eat, monitoring blood sugar, taking medicines if prescribed, and getting regular exercise. · Do not smoke. Smoking affects blood flow and can make foot problems worse. If you need help quitting, talk to your doctor about stop-smoking programs and medicines. These can increase your chances of quitting for good. · Eat a diet that is low in fats. High fat intake can cause fat to build up in your blood vessels and decrease blood flow. · Inspect your feet daily for blisters, cuts, cracks, or sores. If you cannot see well, use a mirror or have someone help you. · Take care of your feet:  ? Wash your feet every day. Use warm (not hot) water. Check the water temperature with your wrists or other part of your body, not your feet. ? Dry your feet well. Pat them dry. Do not rub the skin on your feet too hard. Dry well between your toes. If the skin on your feet stays moist, bacteria or a fungus can grow, which can lead to infection. ?  Keep your skin soft. Use moisturizing skin cream to keep the skin on your feet soft and prevent calluses and cracks. But do not put the cream between your toes, and stop using any cream that causes a rash. ? Clean underneath your toenails carefully. Do not use a sharp object to clean underneath your toenails. Use the blunt end of a nail file or other rounded tool. ? Trim and file your toenails straight across to prevent ingrown toenails. Use a nail clipper, not scissors. Use an emery board to smooth the edges. · Change socks daily. Socks without seams are best, because seams often rub the feet. You can find socks for people with diabetes from specialty catalogs. · Look inside your shoes every day for things like gravel or torn linings, which could cause blisters or sores. · Buy shoes that fit well:  ? Look for shoes that have plenty of space around the toes. This helps prevent bunions and blisters. ? Try on shoes while wearing the kind of socks you will usually wear with the shoes. ? Avoid plastic shoes. They may rub your feet and cause blisters. Good shoes should be made of materials that are flexible and breathable, such as leather or cloth. ? Break in new shoes slowly by wearing them for no more than an hour a day for several days. Take extra time to check your feet for red areas, blisters, or other problems after you wear new shoes. · Do not go barefoot. Do not wear sandals, and do not wear shoes with very thin soles. Thin soles are easy to puncture. They also do not protect your feet from hot pavement or cold weather. · Have your doctor check your feet during each visit. If you have a foot problem, see your doctor. Do not try to treat an early foot problem at home. Home remedies or treatments that you can buy without a prescription (such as corn removers) can be harmful. · Always get early treatment for foot problems. A minor irritation can lead to a major problem if not properly cared for early.   When should you call for help? Call your doctor now or seek immediate medical care if:    · You have a foot sore, an ulcer or break in the skin that is not healing after 4 days, bleeding corns or calluses, or an ingrown toenail.     · You have blue or black areas, which can mean bruising or blood flow problems.     · You have peeling skin or tiny blisters between your toes or cracking or oozing of the skin.     · You have a fever for more than 24 hours and a foot sore.     · You have new numbness or tingling in your feet that does not go away after you move your feet or change positions.     · You have unexplained or unusual swelling of the foot or ankle.    Watch closely for changes in your health, and be sure to contact your doctor if:    · You cannot do proper foot care. Where can you learn more? Go to http://tom-suhail.info/. Enter A739 in the search box to learn more about \"Diabetes Foot Health: Care Instructions. \"  Current as of: July 25, 2018  Content Version: 11.9  © 0845-4788 [employer]. Care instructions adapted under license by [employer] (which disclaims liability or warranty for this information). If you have questions about a medical condition or this instruction, always ask your healthcare professional. [address] any warranty or liability for your use of this information.

## 2019-02-18 NOTE — PROGRESS NOTES
Subjective:     Jimbo Avina is a 70 y.o. female seen for follow up of diabetes. She also has hypertension. Diabetic Review of Systems - medication compliance: compliant all of the time, diabetic diet compliance: compliant most of the time, home glucose monitoring: is performed regularly. Other symptoms and concerns: needs asthma meds refilled. Sleep concerns ongoing was on [place]. Current Outpatient Medications   Medication Sig Dispense Refill    Ferrous Sulfate 27 mg iron tab Take  by mouth.  multivitamin (ONE A DAY) tablet Take 1 Tab by mouth daily.  diclofenac EC (VOLTAREN) 75 mg EC tablet Take  by mouth.  metFORMIN (GLUCOPHAGE) 1,000 mg tablet Take 1 Tab by mouth two (2) times daily (with meals). 180 Tab 3    pioglitazone (ACTOS) 15 mg tablet Take 1 Tab by mouth daily. 90 Tab 1    albuterol sulfate (PROVENTIL;VENTOLIN) 2.5 mg/0.5 mL nebu nebulizer solution 0.5 mL by Nebulization route every four (4) hours as needed for Wheezing. 150 mL 3    albuterol (ACCUNEB) 1.25 mg/3 mL nebu 3 mL by Nebulization route every four (4) hours as needed. 540 mL 3    traZODone (DESYREL) 50 mg tablet Take 1 Tab by mouth nightly. 30 Tab 0    cloNIDine HCl (CATAPRES) 0.1 mg tablet 1 in AM, 2 in  Tab 3    albuterol (PROVENTIL VENTOLIN) 2.5 mg /3 mL (0.083 %) nebulizer solution 3 mL by Nebulization route every four (4) hours as needed for Wheezing. J45.40 150 Each 3    ipratropium (ATROVENT) 0.02 % soln 2.5 mL by Nebulization route every four (4) hours as needed. J45.40 150 mL 3    fluticasone (FLONASE) 50 mcg/actuation nasal spray 2 Sprays by Both Nostrils route daily. 1 Bottle 3    fluticasone-salmeterol (ADVAIR DISKUS) 500-50 mcg/dose diskus inhaler Take 1 Puff by inhalation two (2) times a day. 60 Each 3    albuterol (VENTOLIN HFA) 90 mcg/actuation inhaler Take 2 Puffs by inhalation every six (6) hours as needed for Wheezing.  1 Inhaler 11    hydroxychloroquine (PLAQUENIL) 200 mg tablet Take 1 Tab by mouth two (2) times a day. 60 Tab 3    glucose blood VI test strips (ACCU-CHEK SMARTVIEW TEST STRIP) strip by Does Not Apply route See Admin Instructions. Test three times daily to follow hypoglycemia, labile glucoses 270 Strip 0    Lancets (LANCETS,ULTRA THIN) misc Test daily 102 Each 3    calcium-cholecalciferol, d3, (CALCIUM 600 + D) 600-125 mg-unit tab Take  by mouth.  cholecalciferol (VITAMIN D3) 1,000 unit tablet Take  by mouth daily.  OTHER accuchek comfort curve monitor kit 1 Device 0    losartan (COZAAR) 100 mg tablet Take 1 Tab by mouth daily. 90 Tab 3    estradiol (ESTRACE) 0.01 % (0.1 mg/gram) vaginal cream Insert 0.5 gram into vagina two times a week. 42.5 g 3    zolpidem (AMBIEN) 10 mg tablet Take 1 Tab by mouth nightly as needed for Sleep. Max Daily Amount: 10 mg. 30 Tab 2    omeprazole (PRILOSEC) 40 mg capsule Take 1 Cap by mouth daily. 90 Cap 3    meclizine (ANTIVERT) 25 mg tablet Take 1 Tab by mouth three (3) times daily as needed for Dizziness. 90 Tab 1    diazePAM (VALIUM) 10 mg tablet Take 1 hour before MRI scan 1 Tab 0    omeprazole (PRILOSEC) 40 mg capsule TAKE ONE CAPSULE BY MOUTH ONCE DAILY 90 Cap 0    glucose blood VI test strips (ACCU-CHEK COMFORT CURVE TEST) strip Test blood sugar bid and prn for diagnosis 250.00 100 Strip 3     Allergies   Allergen Reactions    Cefdinir Rash        Lab Results   Component Value Date/Time    Hemoglobin A1c 7.0 (H) 07/06/2018 10:04 AM    Hemoglobin A1c (POC) 6.3 02/18/2019 10:11 AM         Review of Systems  Pertinent items are noted in HPI. Objective:     Visit Vitals  /86 (BP 1 Location: Right arm, BP Patient Position: Sitting)   Pulse 95   Temp 96.4 °F (35.8 °C) (Oral)   Resp 16   Ht 5' 2\" (1.575 m)   Wt 150 lb (68 kg)   SpO2 100%   BMI 27.44 kg/m²     Appearance: alert, well appearing, and in no distress.   Exam: heart sounds normal rate, regular rhythm, normal S1, S2, no murmurs, rubs, clicks or gallops, chest clear, no hepatosplenomegaly, no carotid bruits, feet: warm, good capillary refill  Lab review: labs are reviewed, up to date and normal.    Assessment/Plan:     diabetes well controlled, hypertension stable. Diabetic issues reviewed with her: all medications, side effects and compliance discussed carefully, foot care discussed and Podiatry visits discussed and annual eye examinations at Ophthalmology discussed. For sleep will trial trazodone. rtc 6 months    Encounter Diagnoses   Name Primary?     Controlled type 2 diabetes mellitus without complication, without long-term current use of insulin (HCC) Yes    Mild persistent asthma without complication     Essential hypertension, benign      Orders Placed This Encounter    AMB POC HEMOGLOBIN A1C    Ferrous Sulfate 27 mg iron tab    multivitamin (ONE A DAY) tablet    diclofenac EC (VOLTAREN) 75 mg EC tablet    metFORMIN (GLUCOPHAGE) 1,000 mg tablet    pioglitazone (ACTOS) 15 mg tablet    albuterol sulfate (PROVENTIL;VENTOLIN) 2.5 mg/0.5 mL nebu nebulizer solution    albuterol (ACCUNEB) 1.25 mg/3 mL nebu    traZODone (DESYREL) 50 mg tablet    cloNIDine HCl (CATAPRES) 0.1 mg tablet

## 2019-02-18 NOTE — PROGRESS NOTES
Luci Ramirez is a 70 y.o. female (: 1950) presenting to address:    Chief Complaint   Patient presents with    Asthma    Hypertension    Diabetes       Vitals:    19 0916 19 0926 19 1015   BP: 166/86 152/82 144/86   Pulse: 95     Resp: 16     Temp: 96.4 °F (35.8 °C)     TempSrc: Oral     SpO2: 100%     Weight: 150 lb (68 kg)     Height: 5' 2\" (1.575 m)     PainSc:   0 - No pain         Hearing/Vision:   No exam data present    Learning Assessment:     Learning Assessment 2015   PRIMARY LEARNER Patient   HIGHEST LEVEL OF EDUCATION - PRIMARY LEARNER  DID NOT GRADUATE HIGH SCHOOL   BARRIERS PRIMARY LEARNER NONE   CO-LEARNER CAREGIVER No   PRIMARY LANGUAGE ENGLISH    NEED -   LEARNER PREFERENCE PRIMARY OTHER (COMMENT)   ANSWERED BY patient   RELATIONSHIP SELF     Depression Screening:     3 most recent PHQ Screens 2019   Little interest or pleasure in doing things Not at all   Feeling down, depressed, irritable, or hopeless Not at all   Total Score PHQ 2 0     Fall Risk Assessment:     Fall Risk Assessment, last 12 mths 2018   Able to walk? Yes   Fall in past 12 months? No     Abuse Screening:     Abuse Screening Questionnaire 2018   Do you ever feel afraid of your partner? N   Are you in a relationship with someone who physically or mentally threatens you? N   Is it safe for you to go home? Y     Coordination of Care Questionaire:   1. Have you been to the ER, urgent care clinic since your last visit? Hospitalized since your last visit? NO    2. Have you seen or consulted any other health care providers outside of the [address] [place] [address] since your last visit? Include any pap smears or colon screening. YES; Rheumatology, Dr. Madhu Vasquez    Advanced Directive:   1. Do you have an Advanced Directive? NO    2. Would you like information on Advanced Directives?  YES

## 2019-03-14 ENCOUNTER — TELEPHONE (OUTPATIENT)
Dept: FAMILY MEDICINE CLINIC | Age: 69
End: 2019-03-14

## 2019-03-14 NOTE — TELEPHONE ENCOUNTER
Pt called wanted to know why her prescription for Advair 500/50mg has not been sent over to her pharmacy.

## 2019-03-15 RX ORDER — FLUTICASONE PROPIONATE AND SALMETEROL 500; 50 UG/1; UG/1
1 POWDER RESPIRATORY (INHALATION) 2 TIMES DAILY
Qty: 60 EACH | Refills: 3 | Status: SHIPPED | OUTPATIENT
Start: 2019-03-15

## 2019-03-25 ENCOUNTER — TELEPHONE (OUTPATIENT)
Dept: FAMILY MEDICINE CLINIC | Age: 69
End: 2019-03-25

## 2019-03-25 RX ORDER — ALBUTEROL SULFATE 0.83 MG/ML
2.5 SOLUTION RESPIRATORY (INHALATION)
Qty: 150 EACH | Refills: 0 | Status: SHIPPED | OUTPATIENT
Start: 2019-03-25 | End: 2019-06-06 | Stop reason: SDUPTHER

## 2019-03-25 NOTE — TELEPHONE ENCOUNTER
Orders Placed This Encounter    albuterol (PROVENTIL VENTOLIN) 2.5 mg /3 mL (0.083 %) nebulizer solution     Sig: 3 mL by Nebulization route every four (4) hours as needed for Wheezing. J45.40     Dispense:  150 Each     Refill:  0     On behalf of RALEIGH Hernandes.

## 2019-03-25 NOTE — TELEPHONE ENCOUNTER
Pt call stating that [address] [place] [address] would not fill prescription due to it being written wrong  albuterol sulfate (PROVENTIL;VENTOLIN) 2.5 mg/0.5 mL

## 2019-03-25 NOTE — TELEPHONE ENCOUNTER
Spoke with pharmacist at [facility], patient needs the albuterol (Proventil Ventolin) 2.5 mg/3mL ordered, it does not require pre-mixing, it is ready to use; please advise.

## 2019-06-07 RX ORDER — ALBUTEROL SULFATE 0.83 MG/ML
SOLUTION RESPIRATORY (INHALATION)
Qty: 360 EACH | Refills: 0 | Status: SHIPPED | OUTPATIENT
Start: 2019-06-07 | End: 2019-08-29 | Stop reason: ALTCHOICE

## 2019-07-19 RX ORDER — TRAZODONE HYDROCHLORIDE 50 MG/1
TABLET ORAL
Qty: 30 TAB | Refills: 2 | Status: SHIPPED | OUTPATIENT
Start: 2019-07-19

## 2019-08-26 ENCOUNTER — TELEPHONE (OUTPATIENT)
Dept: FAMILY MEDICINE CLINIC | Age: 69
End: 2019-08-26

## 2019-08-26 NOTE — TELEPHONE ENCOUNTER
Patient is requesting refills: Ventolin  Last Filled: 06/07/2019  Qty: 360  Refills: 0  Last Visit:02/18/2019   No future appointments.   Pharmacy Confirmed

## 2019-08-29 RX ORDER — ALBUTEROL SULFATE 0.83 MG/ML
SOLUTION RESPIRATORY (INHALATION)
Qty: 360 EACH | Refills: 0 | Status: SHIPPED | OUTPATIENT
Start: 2019-08-29

## 2019-12-30 RX ORDER — METFORMIN HYDROCHLORIDE 1000 MG/1
1000 TABLET ORAL 2 TIMES DAILY WITH MEALS
Qty: 180 TAB | Refills: 3 | OUTPATIENT
Start: 2019-12-30

## 2019-12-30 NOTE — TELEPHONE ENCOUNTER
Patient is requesting refills: metformin  Last Filled: 02/18/2019  Qty: 180  Refills:3   Last Visit: 02/18/2019  No future appointments.   Pharmacy Confirmed

## 2020-03-17 NOTE — TELEPHONE ENCOUNTER
Last OV 02/18/2019    Last refill 02/18/2019 qty 90 with 3 refills    No future appointments. NP Randall Avery is no longer practicing.   Pt to re-establish care for further refills

## 2020-03-18 RX ORDER — LOSARTAN POTASSIUM 100 MG/1
TABLET ORAL
Qty: 30 TAB | Refills: 0 | Status: SHIPPED | OUTPATIENT
Start: 2020-03-18

## 2020-06-04 RX ORDER — METFORMIN HYDROCHLORIDE 1000 MG/1
TABLET ORAL
Qty: 180 TAB | Refills: 0 | OUTPATIENT
Start: 2020-06-04

## 2020-06-04 RX ORDER — CLONIDINE HYDROCHLORIDE 0.1 MG/1
TABLET ORAL
Qty: 270 TAB | Refills: 0 | OUTPATIENT
Start: 2020-06-04

## 2021-04-28 ENCOUNTER — HOSPITAL ENCOUNTER (OUTPATIENT)
Dept: PHYSICAL THERAPY | Age: 71
Discharge: HOME | End: 2021-04-28
Payer: MEDICARE

## 2021-04-28 PROCEDURE — 97140 MANUAL THERAPY 1/> REGIONS: CPT

## 2021-04-28 PROCEDURE — 97110 THERAPEUTIC EXERCISES: CPT

## 2021-04-28 PROCEDURE — 97161 PT EVAL LOW COMPLEX 20 MIN: CPT

## 2021-04-28 NOTE — PROGRESS NOTES
[address] [place] [address] PHYSICAL THERAPY   [facility], Samy Roy [address],[facility], 70 [facility] - Phone: [phone]  Fax: [phone] / [address] [facility]  Patient Name: Moi Hunter : 1950   Medical   Diagnosis: Low back pain [M54.5] Treatment Diagnosis: Low back pain [M54.5]   Onset Date: 2020     Referral Source: Jm Delgado MD [facility]): 2021   Prior Hospitalization: See medical history Provider #: [id number]   Prior Level of Function: Was in [facility] prior to surgery, was able to walk and stand and transfer without pain prior to injury   Comorbidities: RA, minieres, prior surgery, back pain, [id number], HTN,    Medications: Verified on Patient Summary List   The Plan of Care and following information is based on the information from the initial evaluation.   ===========================================================================================  Subjective: Says had microdiscectomy in 2020. She continued to have inflammation in the right leg and hip. Fall  in bedroom. Had Xray but no result. Been walking with walker since the surgery, used to use a WC before surgery. Sitting in recliner is ok. Transfering is painful. Can't walk normally, Must walk with walker to relieve pressure. Rest helps. Stand for 2 minutes then the pain. Symptoms go from the right hip down to the knee. Wishes to walk and stand for a wedding at the end of May. Assessment / key information:  Pt presents to [facility] at [facility], [facility]. 5 months s/p microdiscectomy with signs and symptoms congruent with a diagnosis of Low back pain, significant muscle guarding/tone along the right QL and paraspinals and postural compensations. Pt with Right LE symptoms along the posterior glute, laterally to the knee. This was provoked with weight bearing and right side bending.  This affects her standing tolerance, walking tolerance, transfer ability and QOL. Patient would benefit from skilled intervention to address the above deficits, improve quality of life and return patient to maximum level of prior function. OBJECTIVE:   Gait/Posture: Gait with 4WW, flexed and leaned to the left. Noted left curvature of the spine, shoulder left compared to hips, lumbar and lower thoracic kyphosis and upper thoracic flatterning. Pt unable to stand upright, lacking 10 deg from neutral. Standing posture shows an overall 5 deg left shift   Trunk AROM  Flexion: finger-tips to ankles, Right LBP  Extension: (-10) deg  Side Bend: R: pain and reproduced RIGHT LE symptoms. Rotation: R: 45 deg,  L: 55 deg Right hip P! Slump: (-)  Reflexes: hypo, 1+ at lowers  Palpation: TTP to the RIGHT QL and illiacus along illium. Transfer/Squat: Uses 2UEs to rise and sit, antalgic, slow. HEP:  Access Code: [email or web address]  URL: [email or web address]: 04/28/2021Prepared by: Guerry Blade   Seated Thoracic Lumbar Extension - 3 x daily - 7 x weekly - 1 sets - 10 reps   Seated Trunk Rotation - Arms Crossed - 3 x daily - 7 x weekly - 1 sets - 10 reps   Seated Sidebending - 3 x daily - 7 x weekly - 1 sets - 10 reps   Seated Posterior Pelvic Tilt - 3 x daily - 7 x weekly - 1 sets - 10 reps  ===========================================================================================  Eval Complexity: History HIGH Complexity :3+ comorbidities / personal factors will impact the outcome/ POC ;  Examination  LOW Complexity : 1-2 Standardized tests and measures addressing body structure, function, activity limitation and / or participation in recreation ; Presentation LOW Complexity : Stable, uncomplicated ;  Decision Making Other outcome measures FOTO NT today, will assess NV  MEDIUM;  Overall Complexity LOW   Problem List: pain affecting function, decrease ROM, decrease strength, impaired gait/ balance, decrease ADL/ functional abilitiies, decrease activity tolerance and decrease transfer abilities   Treatment Plan may include any combination of the following: Therapeutic exercise, Therapeutic activities, Neuromuscular re-education, Physical agent/modality, Gait/balance training, Manual therapy, Patient education, Self Care training and Functional mobility training  Patient / Family readiness to learn indicated by: asking questions and interest  Persons(s) to be included in education: patient (P)  Barriers to Learning/Limitations: None  Measures taken, if barriers to learning:    Patient Goal (s): \"less pain\" better walking, standing, transfers   Patient self reported health status: fair  Rehabilitation Potential: good   Short Term Goals: To be accomplished in  3  weeks  STG1 Pt to report adherence and demonstrate compliance with basic HEP to allow progress between visits  STG2 Pt to report pain <5/10 at worst to allow improved function and QOL  STG3 Pt to demonstrate standing to neutral with 4WW to allow normalization of ADLs in home  STG4 Pt to report RIGHT leg pain <3/7 days last week to indicate reduced frequency of nerve symptoms   Long Term Goals: To be accomplished in  6  weeks  LTG1 Pt to improve FOTO score by 15+ points indicating improved function in daily tasks  LTG2 Pt to improve lumbar AROM extension to 5 deg, without increase in pain to allow improved function in the home  LTG3 Pt to demonstrate 10 minutes of walking in clinic and report 20 minutes in the community without increase in pain to indicate improved community engagement.     Frequency / Duration:   Patient to be seen  2  times per week for 6  weeks  Patient / Caregiver education and instruction: self care and exercises  Therapist Signature: Charlotte Pollard, PT Date: 7/30/4095   Certification Period: 4/28/21 - 7/27/21 Time: 3:17 PM   ===========================================================================================  I certify that the above Physical Therapy Services are being furnished while the patient is under my care. I agree with the treatment plan and certify that this therapy is necessary. Physician Signature:        Date:       Time:                                        Blanche Essex, MD  Please sign and return to [facility] at [facility], [facility]. or you may fax the signed copy to [phone]. Thank you.

## 2021-04-28 NOTE — PROGRESS NOTES
PHYSICAL THERAPY - DAILY TREATMENT NOTE    Patient Name: Ward Cost        Date: 2021  : 1950   YES Patient  Verified  Visit #:     Insurance: Payor: Rukhsana Gomes / Plan: [facility] HU[employer] MEDICARE CHOICE PPO/PFFS / Product Type: Managed Care Medicare /      In time: 3:15 Out time: 4:15   Total Treatment Time: 60     Medicare/[facility] Time Tracking (below)   Total Timed Codes (min):  25 1:1 Treatment Time:  60     TREATMENT AREA =  Low back pain [M54.5]    SUBJECTIVE    Pain Level (on 0 to 10 scale):  10  / 10   Medication Changes/New allergies or changes in medical history, any new surgeries or procedures? NO    If yes, update Summary List   Subjective Functional Status/Changes:  []  No changes reported     See POC          OBJECTIVE  10 min Manual Therapy: Side lying STM/ischemic pressure to the Right QL, Right hip extension stretch, illiacus STM  *pt walked faster,more upright and reports feels better after intervention*   Rationale:      decrease pain, increase ROM and increase tissue extensibility to improve patient's ability to move the trunk, reduce tone, walk upright  The manual therapy interventions were performed at a separate and distinct time from the therapeutic activities interventions. 10 min Therapeutic Exercise:  [x]  See flow sheet   Rationale:      increase ROM and improve coordination to improve the patients ability to make gains between visits     5 min Self Care: Reviewed diagnosis, prognosis, therapy progression   Rationale:    Improve understanding of injury and therapy to have realistic expectation of therapy to improve compliance/adherence and satisfaction    Billed With/As:   [] TE   [] TA   [] Neuro   [x] Self Care Patient Education: [x] Review HEP    [] Progressed/Changed HEP based on:   [x] Addressed barriers and behaviors     [] Therapeutic Neuroscience Pain Education, metaphor, reframing, contexts.     [x] Sleep Education   [] Body Mechanics [x] Healing Timeframe     [] Self STM with ball at \"the spot\"  [x] Walking Program/Global Activity   [] other:         Other Objective/Functional Measures:    See POC note     Post Treatment Pain Level (on 0 to 10) scale:   8 / 10     ASSESSMENT  Assessment/Changes in Function:     See POC     []  See Progress Note/Recertification   Patient will continue to benefit from skilled PT services to modify and progress therapeutic interventions, address functional mobility deficits, address ROM deficits, address strength deficits, analyze and address soft tissue restrictions, analyze and cue movement patterns, analyze and modify body mechanics/ergonomics and assess and modify postural abnormalities to attain goals per POC. Progress toward goals / Updated goals:    Pt evaluated today.  See POC     PLAN  [x]  Upgrade activities as tolerated YES Continue plan of care   []  Discharge due to :    []  Other:      Therapist: Kelly Mulligan, PT, DPT, OCS    Date: 4/28/2021 Time: 3:17 PM

## 2021-04-30 ENCOUNTER — HOSPITAL ENCOUNTER (OUTPATIENT)
Dept: PHYSICAL THERAPY | Age: 71
Discharge: HOME | End: 2021-04-30
Payer: MEDICARE

## 2021-04-30 PROCEDURE — 97140 MANUAL THERAPY 1/> REGIONS: CPT

## 2021-04-30 PROCEDURE — 97014 ELECTRIC STIMULATION THERAPY: CPT

## 2021-04-30 PROCEDURE — 97110 THERAPEUTIC EXERCISES: CPT

## 2021-04-30 NOTE — PROGRESS NOTES
PHYSICAL THERAPY - DAILY TREATMENT NOTE    Patient Name: Donaldo Calvin        Date: 2021  : 1950   YES Patient  Verified  Visit #:      of   12  Insurance: Payor: Cookie  / Plan: [facility] HU[employer]NA MEDICARE CHOICE PPO/PFFS / Product Type: Managed Care Medicare /      In time: 96 Out time: 1110   Total Treatment Time: 55     Medicare/[facility] Time Tracking (below)   Total Timed Codes (min):  45 1:1 Treatment Time:  45     TREATMENT AREA =  Low back pain [M54.5]    SUBJECTIVE  Pain Level (on 0 to 10 scale):  10  / 10   Medication Changes/New allergies or changes in medical history, any new surgeries or procedures? NO If yes, update Summary List   Subjective Functional Status/Changes:  []  No changes reported     Reports felt better after last time, walked straighter. Reports right hip pain (anterior). OBJECTIVE  Modalities Rationale:     decrease inflammation, decrease pain and increase tissue extensibility to improve patient's ability to stand upright and walk  10 min [x] Estim, type/location: L S/L to right QL and glute                                     []  att     [x]  unatt     []  w/US     []  w/ice    []  w/heat    min []  Mechanical Traction: type/lbs                   []  pro   []  sup   []  int   []  cont    []  before manual    []  after manual    min []  Ice     []  Heat    location/position:     min []  Vasopneumatic Device, press/temp:     min []  Other:    [] Skin assessment post-treatment (if applicable):    [x]  intact    []  redness- no adverse reaction     []redness  adverse reaction:        30 min Therapeutic Exercise:  [x]  See flow sheet   Rationale:      increase ROM, increase strength and improve coordination to improve the patients ability to stand upright, extend the hip and trunk     15 min Manual Therapy: DTM/STM Right QL and illiacus in Left SL. Hip ext stretch.    Rationale:      decrease pain, increase ROM and increase tissue extensibility to improve patient's ability to stand upright, extend the hip and trunk  The manual therapy interventions were performed at a separate and distinct time from the therapeutic activities interventions. Billed With/As:   [x] TE   [] TA   [] Neuro   [] Self Care Patient Education: [x] Review HEP    [] Progressed/Changed HEP based on:   [] Addressed barriers and behaviors     [] Therapeutic Neuroscience Pain Education, metaphor, reframing, contexts. [] Sleep Education   [] Body Mechanics [] Healing Timeframe     [] Self STM with ball at \"the spot\"  [x] Walking Program/Global Activity   [] other:      Other Objective/Functional Measures:    See Flowsheet for drills, loads and volume. Post Treatment Pain Level (on 0 to 10) scale:   7  / 10     ASSESSMENT  Assessment/Changes in Function:     Pt requires skilled instruction for initiation, form and follow-through for all drills in session. Pt responds well to instruction and demo. Primary focus on trunk ext and global motion, hip extension. Pt with improved standing posture after active drills. Manual to finish to address pains. []  See Progress Note/Recertification   Patient will continue to benefit from skilled PT services to modify and progress therapeutic interventions, address functional mobility deficits, address ROM deficits, address strength deficits, analyze and address soft tissue restrictions, analyze and cue movement patterns, analyze and modify body mechanics/ergonomics and assess and modify postural abnormalities to attain remaining goals. Progress toward goals / Updated goals: · Short Term Goals:  To be accomplished in  3  weeks  STG1 Pt to report adherence and demonstrate compliance with basic HEP to allow progress between visits MET to date  STG2 Pt to report pain <5/10 at worst to allow improved function and QOL  STG3 Pt to demonstrate standing to neutral with 4WW to allow normalization of ADLs in home  STG4 Pt to report RIGHT leg pain <3/7 days last week to indicate reduced frequency of nerve symptoms  · Long Term Goals: To be accomplished in  6  weeks  LTG1 Pt to improve FOTO score by 15+ points indicating improved function in daily tasks  LTG2 Pt to improve lumbar AROM extension to 5 deg, without increase in pain to allow improved function in the home  LTG3 Pt to demonstrate 10 minutes of walking in clinic and report 20 minutes in the community without increase in pain to indicate improved community engagement.      PLAN  [x]  Upgrade activities as tolerated YES Continue plan of care   []  Discharge due to :    []  Other:      Therapist: Lisa Malagon, PT    Date: 4/30/2021 Time: 10:16 AM     Future Appointments   Date Time Provider Chaparrita Kuhn   5/4/2021 10:00 AM Song, Prerna Mustache [facility]   5/7/2021 10:15 AM Song, Prerna Mustache [facility]   5/11/2021  9:45 AM SongJameson   5/14/2021 10:15 AM Song, Prerna Mustache [facility]   5/18/2021 10:45 AM Song, Prerna Mustache [facility]   5/21/2021 10:15 AM Song, Eloisa Nap [facility]   5/25/2021 10:30 AM Song, Eloisa Nap [facility]   5/28/2021  9:30 AM Song, [facility] Nap [facility]   6/1/2021 10:45 AM Song, Eloisa Nap [facility]   6/4/2021 10:15 AM Song, [facility]isa Nap [facility]   6/8/2021 10:30 AM Song, Eloisa Nap [facility]

## 2021-05-04 ENCOUNTER — HOSPITAL ENCOUNTER (OUTPATIENT)
Dept: PHYSICAL THERAPY | Age: 71
Discharge: HOME | End: 2021-05-04
Payer: MEDICARE

## 2021-05-04 PROCEDURE — 97110 THERAPEUTIC EXERCISES: CPT

## 2021-05-04 PROCEDURE — 97014 ELECTRIC STIMULATION THERAPY: CPT

## 2021-05-04 PROCEDURE — 97140 MANUAL THERAPY 1/> REGIONS: CPT

## 2021-05-04 NOTE — PROGRESS NOTES
PHYSICAL THERAPY - DAILY TREATMENT NOTE    Patient Name: Joanne Abel        Date: 2021  : 1950   YES Patient  Verified  Visit #:   3   of   12  Insurance: Payor: [address] [place] [address], [place] / Plan: [facility] HU[employer] MEDICARE CHOICE PPO/PFFS / Product Type: Managed Care Medicare /      In time: 9:50 Out time: 10:40   Total Treatment Time: 50     Medicare/[employer] Time Tracking (below)   Total Timed Codes (min):  40 1:1 Treatment Time:  40     TREATMENT AREA =  Low back pain [M54.5]    SUBJECTIVE  Pain Level (on 0 to 10 scale):  8  / 10   Medication Changes/New allergies or changes in medical history, any new surgeries or procedures? NO If yes, update Summary List   Subjective Functional Status/Changes:  []  No changes reported     Patient reports feeling much better than last time. States that she notices more sharp pain in the R anterior hip into the posterolateral thigh (rarely beyond the knee) with prolonged standing and walking.         OBJECTIVE  Modalities Rationale:     decrease inflammation, decrease pain and increase tissue extensibility to improve patient's ability to stand upright and walk  10 min [x] Estim, type/location: P-Mod to L S/L to right QL and glute                                     []  att     [x]  unatt     []  w/US     [x]  w/ice    []  w/heat    min []  Mechanical Traction: type/lbs                   []  pro   []  sup   []  int   []  cont    []  before manual    []  after manual    min []  Ice     []  Heat    location/position:     min []  Vasopneumatic Device, press/temp:     min []  Other:    [] Skin assessment post-treatment (if applicable):    [x]  intact    []  redness- no adverse reaction     []redness  adverse reaction:        15 min Therapeutic Exercise:  [x]  See flow sheet   Rationale:      increase ROM, increase strength and improve coordination to improve the patients ability to stand upright, extend the hip and trunk     15 min Manual Therapy: L sidelying: DTM/STM Right QL, R TFL, and R glute med; Hip ext stretch. Supine: STM to iliopsoas    Rationale:      decrease pain, increase ROM and increase tissue extensibility to improve patient's ability to stand upright, extend the hip and trunk  The manual therapy interventions were performed at a separate and distinct time from the therapeutic activities interventions. Billed With/As:   [x] TE   [] TA   [] Neuro   [] Self Care Patient Education: [x] Review HEP    [] Progressed/Changed HEP based on:   [] Addressed barriers and behaviors     [] Therapeutic Neuroscience Pain Education, metaphor, reframing, contexts. [] Sleep Education   [] Body Mechanics [] Healing Timeframe     [] Self STM with ball at \"the spot\"  [x] Walking Program/Global Activity   [] other:      Other Objective/Functional Measures:    1:1 TE = 15'    *added sit to stands and lumbar extension to improve strength and mobility      Post Treatment Pain Level (on 0 to 10) scale:  7  / 10     ASSESSMENT  Assessment/Changes in Function:     Patient noted increase pressure in R low back and R posterior hip following therex. Noted anterior hip pain during rest after ambulation around clinic. However patient denied any sharp, radicular R LE pain during PT session. []  See Progress Note/Recertification   Patient will continue to benefit from skilled PT services to modify and progress therapeutic interventions, address functional mobility deficits, address ROM deficits, address strength deficits, analyze and address soft tissue restrictions, analyze and cue movement patterns, analyze and modify body mechanics/ergonomics and assess and modify postural abnormalities to attain remaining goals. Progress toward goals / Updated goals: · Short Term Goals:  To be accomplished in  3  weeks  STG1 Pt to report adherence and demonstrate compliance with basic HEP to allow progress between visits MET to date  STG2 Pt to report pain <5/10 at worst to allow improved function and QOL slowly progressing 05/04 indicated by pre vs post tx pain levels  STG3 Pt to demonstrate standing to neutral with 4WW to allow normalization of ADLs in home progressing 05/04; able with cues   STG4 Pt to report RIGHT leg pain <3/7 days last week to indicate reduced frequency of nerve symptoms  · Long Term Goals: To be accomplished in  6  weeks  LTG1 Pt to improve FOTO score by 15+ points indicating improved function in daily tasks  LTG2 Pt to improve lumbar AROM extension to 5 deg, without increase in pain to allow improved function in the home  LTG3 Pt to demonstrate 10 minutes of walking in clinic and report 20 minutes in the community without increase in pain to indicate improved community engagement.      PLAN  [x]  Upgrade activities as tolerated YES Continue plan of care   []  Discharge due to :    []  Other:      Therapist: Dontrell Chowdhury    Date: 5/4/2021 Time: 11:28 AM     Future Appointments   Date Time Provider Chaparrita Kuhn   5/7/2021 10:15 AM Haresh Jaegers [facility]   5/11/2021  9:45 AM [facility]resh [facility]egers [facility]   5/14/2021 10:15 AM Haresh Jaegers [facility]   5/18/2021 10:45 AM Haresh Jaegers [facility]   5/21/2021 10:15 AM Julisa Jarales [address] [facility]   5/25/2021 10:30 AM Julisa Jarales [address] [facility]   5/28/2021  9:30 AM Julisa Jarales [address] [facility]   6/1/2021 10:45 AM Julisa Jarales [address] [facility]   6/4/2021 10:15 AM Julisa Jarales [address] [facility]   6/8/2021 10:30 AM Julisa Jarales [address] [facility]

## 2021-05-07 ENCOUNTER — HOSPITAL ENCOUNTER (OUTPATIENT)
Dept: PHYSICAL THERAPY | Age: 71
Discharge: HOME | End: 2021-05-07
Payer: MEDICARE

## 2021-05-07 PROCEDURE — 97014 ELECTRIC STIMULATION THERAPY: CPT

## 2021-05-07 PROCEDURE — 97110 THERAPEUTIC EXERCISES: CPT

## 2021-05-07 PROCEDURE — 97140 MANUAL THERAPY 1/> REGIONS: CPT

## 2021-05-07 NOTE — PROGRESS NOTES
PHYSICAL THERAPY - DAILY TREATMENT NOTE    Patient Name: Jordyn Campos        Date: 2021  : 1950   YES Patient  Verified  Visit #:      12  Insurance: Payor: Chance Files / Plan: [facility] [employer] MEDICARE CHOICE PPO/PFFS / Product Type: Managed Care Medicare /      In time: 10:08 Out time: 10:59   Total Treatment Time: 51     Medicare/[facility] Time Tracking (below)   Total Timed Codes (min):  41 1:1 Treatment Time:  41     TREATMENT AREA =  Low back pain [M54.5]    SUBJECTIVE  Pain Level (on 0 to 10 scale):  5  / 10   Medication Changes/New allergies or changes in medical history, any new surgeries or procedures? NO If yes, update Summary List   Subjective Functional Status/Changes:  []  No changes reported     Patient reports feeling much better since doing her HEP at home. SEE PN/RECERT       OBJECTIVE  Modalities Rationale:     decrease inflammation, decrease pain and increase tissue extensibility to improve patient's ability to stand upright and walk  10 min [x] Estim, type/location: P-Mod to L S/L to right QL and glute                                     []  att     [x]  unatt     []  w/US     [x]  w/ice    []  w/heat    min []  Mechanical Traction: type/lbs                   []  pro   []  sup   []  int   []  cont    []  before manual    []  after manual    min []  Ice     []  Heat    location/position:     min []  Vasopneumatic Device, press/temp:     min []  Other:    [x] Skin assessment post-treatment (if applicable):    [x]  intact    []  redness- no adverse reaction     []redness  adverse reaction:        16 min Therapeutic Exercise:  [x]  See flow sheet   Rationale:      increase ROM, increase strength and improve coordination to improve the patients ability to stand upright, extend the hip and trunk     15 min Manual Therapy: Reassessment; L sidelying: DTM/STM Right QL, R TFL, and R glute med; Hip ext stretch.   Supine: STM to iliopsoas    Rationale:      decrease pain, increase ROM and increase tissue extensibility to improve patient's ability to stand upright, extend the hip and trunk  The manual therapy interventions were performed at a separate and distinct time from the therapeutic activities interventions. 10 min Self Care: Reassessment. Issued and reviewed updated long-term HEP. Educated pt on use of SPC vs rollator/FWW   Rationale:  to improve understanding of current diagnosis with realistic expectation of PT to improve compliance/adherence and satisfaction      Billed With/As:   [x] TE   [] TA   [] Neuro   [] Self Care Patient Education: [x] Review HEP    [] Progressed/Changed HEP based on:   [] Addressed barriers and behaviors     [] Therapeutic Neuroscience Pain Education, metaphor, reframing, contexts. [] Sleep Education   [] Body Mechanics [] Healing Timeframe     [] Self STM with ball at \"the spot\"  [x] Walking Program/Global Activity   [] other:      Other Objective/Functional Measures:    SEE PN/RECERT     Post Treatment Pain Level (on 0 to 10) scale:  4  / 10     ASSESSMENT  Assessment/Changes in Function:     SEE PN/RECERT     []  See Progress Note/Recertification   Patient will continue to benefit from skilled PT services to modify and progress therapeutic interventions, address functional mobility deficits, address ROM deficits, address strength deficits, analyze and address soft tissue restrictions, analyze and cue movement patterns, analyze and modify body mechanics/ergonomics and assess and modify postural abnormalities to attain remaining goals.    Progress toward goals / Updated goals:    SEE PN/RECERT     PLAN  [x]  Upgrade activities as tolerated YES Continue plan of care   []  Discharge due to :    []  Other:      Therapist: Kashmir Truong    Date: 5/7/2021 Time: 12:59 PM     Future Appointments   Date Time Provider Chaparrita Kuhn   5/7/2021 10:15 AM Leskae [facility]   5/11/2021  9:45 AM Leskae Rough [facility]   5/14/2021 10:15 AM Dorma Box [facility]   5/18/2021 10:45 AM Dorma Box [facility]   5/21/2021 10:15 AM Dorma Box [facility]   5/25/2021 10:30 AM [facility]   5/28/2021  9:30 AM [facility]   6/1/2021 10:45 AM [facility]   6/4/2021 10:15 AM [facility]   6/8/2021 10:30 AM [facility]

## 2021-05-07 NOTE — PROGRESS NOTES
[address] [facility] PHYSICAL THERAPY   [facility], [address],[place], [address] [facility] - Phone: [phone]  Fax: [phone]  CONTINUED PLAN OF CARE/RECERTIFICATION FOR PHYSICAL THERAPY          Patient Name: Aida Mancilla : 1950   Treatment/Medical Diagnosis: Low back pain [M54.5]   Onset Date: 2020    Referral Source: Arianne Ramos MD Start of [facility]): 21   Prior Hospitalization: See Medical History Provider #: [id number]   Prior Level of Function: Was in [facility] prior to surgery, was able to walk and stand and transfer without pain prior to injury   Comorbidities: RA, minieres, prior surgery, back pain, [id number], HTN,    Medications: Verified on Patient Summary List   Visits from [facility]: 4 Missed Visits: 0     · Short Term Goals: To be accomplished in  3  weeks  STG1 Pt to report adherence and demonstrate compliance with basic HEP to allow progress between visits              Status at last note/certification: goal established               Current status: I and compliant with basic HEP              Goal met?: yes    STG2 Pt to report pain <5/10 at worst to allow improved function and QOL              Status at last note/certification: goal established               Current status: 10/10              Goal met?: no    STG3 Pt to demonstrate standing to neutral with 4WW to allow normalization of ADLs in home              Status at last note/certification: goal established               Current status: able              Goal met?: yes    STG4 Pt to report RIGHT leg pain <3/7 days last week to indicate reduced frequency of nerve symptoms              Status at last note/certification: goal established               Current status: 5/7               Goal met?: progressing        Key Functional Changes/Progress: Patient has made good progress thus far with PT interventions for low back pain. In the last 2 weeks pain has ranged between 4-10/10.  Denies any falls since initial evaluation. Notes most pain on the R low back into the R anterior thigh. Notes most improvement with overall reduced pain with functional mobility activities and ADLs. Able to tolerate standing for 5 minutes and walk for 5 minutes before onset of pain. Able to ambulate household distances with [facility] and rollator for community distances. Able to maintain neutral upright position during ambulation, however demo fatigue (>50 feet) and requires rest. Demonstrates good HEP compliance and understanding of self management of symptoms with modalities at home. Current FOTO 42/100 (increase 22 points) and +7 on GROC indicating functional improvement. Patient would continue to benefit from skilled PT to continue to improve functional ROM, strength, stability, and endurance to complete household activities and improve tolerance with functional mobility activities. Patient's insurance has authorized limited visits, therefore may limit patient's future PT attendance. Insurance initially authorized only 4 visits, PT office has submitted request for additional authorization for PT visits. Problem List: pain affecting function, decrease ROM, decrease strength, impaired gait/ balance, decrease ADL/ functional abilitiies, decrease activity tolerance, decrease flexibility/ joint mobility and decrease transfer abilities   Treatment Plan may include any combination of the following: Therapeutic exercise, Therapeutic activities, Neuromuscular re-education, Physical agent/modality, Manual therapy, Patient education, Self Care training, Functional mobility training, Home safety training and Stair training  Patient Goal(s) has been updated and includes:      Goals for this certification period include and are to be achieved in   4 more  weeks:  · Long Term Goals:  To be accomplished  LTG1 Pt to improve FOTO score by 15+ points indicating improved function in daily tasks  LTG2 Pt to improve lumbar AROM extension to 5 deg, without increase in pain to allow improved function in the home  LTG3 Pt to demonstrate 10 minutes of walking in clinic and report 20 minutes in the community without increase in pain to indicate improved community engagement. Frequency / Duration:   Patient to be seen   2   times per week for   4 more    weeks:    Assessments/Recommendations: Continue towards goals  If you have any questions/comments please contact us directly at [phone]. Thank you for allowing us to assist in the care of your patient. Therapist Signature: Shonna Felton PT DPT [facility] Date: 4/1/0296   Certification Period:  Reporting Period: 4/28/21 - 7/27/21 4/28/21 - 5/7/21 Time: 4:19 PM   NOTE TO PHYSICIAN:  PLEASE COMPLETE THE ORDERS BELOW AND FAX TO   [facility]: [phone]  If you are unable to process this request in 24 hours please contact our office: [phone]    ___ I have read the above report and request that my patient continue as recommended.   ___ I have read the above report and request that my patient continue therapy with the following changes/special instructions:_________________________________________________________   ___ I have read the above report and request that my patient be discharged from therapy.      Physician Signature:        Date:       Time:                                 Karyn Landa MD

## 2021-05-11 ENCOUNTER — APPOINTMENT (OUTPATIENT)
Dept: PHYSICAL THERAPY | Age: 71
End: 2021-05-11
Payer: MEDICARE

## 2021-05-14 ENCOUNTER — APPOINTMENT (OUTPATIENT)
Dept: PHYSICAL THERAPY | Age: 71
End: 2021-05-14
Payer: MEDICARE

## 2021-05-18 ENCOUNTER — HOSPITAL ENCOUNTER (OUTPATIENT)
Dept: PHYSICAL THERAPY | Age: 71
Discharge: HOME | End: 2021-05-18
Payer: MEDICARE

## 2021-05-18 PROCEDURE — 97110 THERAPEUTIC EXERCISES: CPT

## 2021-05-18 PROCEDURE — 97014 ELECTRIC STIMULATION THERAPY: CPT

## 2021-05-18 PROCEDURE — 97116 GAIT TRAINING THERAPY: CPT

## 2021-05-18 NOTE — PROGRESS NOTES
PHYSICAL THERAPY - DAILY TREATMENT NOTE    Patient Name: Lisa Mendez        Date: 2021  : 1950   YES Patient  Verified  Visit #:     Insurance: Payor: Kevin Delgado / Plan: [facility] [employer] MEDICARE CHOICE PPO/PFFS / Product Type: Managed Care Medicare /      In time: 10:48 Out time: 11:34   Total Treatment Time: 46     Medicare/[facility] Time Tracking (below)   Total Timed Codes (min):  46 1:1 Treatment Time:  46     TREATMENT AREA =  Low back pain [M54.5]    SUBJECTIVE  Pain Level (on 0 to 10 scale): 5  / 10   Medication Changes/New allergies or changes in medical history, any new surgeries or procedures? NO If yes, update Summary List   Subjective Functional Status/Changes:  []  No changes reported     Patient reports practicing walking with her cane at home. States that she has been doing HEP consistently. OBJECTIVE  Modalities Rationale:     decrease inflammation, decrease pain and increase tissue extensibility to improve patient's ability to stand upright and walk  10 min [x] Estim, type/location: P-Mod to L S/L to right QL and glute                                     []  att     [x]  unatt     []  w/US     [x]  w/ice    []  w/heat    min []  Mechanical Traction: type/lbs                   []  pro   []  sup   []  int   []  cont    []  before manual    []  after manual    min []  Ice     []  Heat    location/position:     min []  Vasopneumatic Device, press/temp:     min []  Other:    [x] Skin assessment post-treatment (if applicable):    [x]  intact    []  redness- no adverse reaction     []redness  adverse reaction:        26 min Therapeutic Exercise:  [x]  See flow sheet   Rationale:      increase ROM, increase strength and improve coordination to improve the patients ability to stand upright, extend the hip and trunk     10 min Gait Trainin feet with single point cane on level surfaces with stand by level of assistance.  Sidestepping and retro walking in parallel bars (3x)    Rationale: To improve ambulation safety and efficiency in order to improve patient's ability to safely ambulate at home for self care. Billed With/As:   [x] TE   [] TA   [] Neuro   [] Self Care Patient Education: [x] Review HEP    [] Progressed/Changed HEP based on:   [] Addressed barriers and behaviors     [] Therapeutic Neuroscience Pain Education, metaphor, reframing, contexts. [] Sleep Education   [] Body Mechanics [] Healing Timeframe     [] Self STM with ball at \"the spot\"  [x] Walking Program/Global Activity   [] other:      Other Objective/Functional Measures:    1:1 TE + GT = 36' (1 TE billed)    *able to ambulate with SPC around 2nd floor of clinic without rest breaks   *progressed exercises to improve LE strength and functional balance (see flowsheet)     Post Treatment Pain Level (on 0 to 10) scale:  4  / 10     ASSESSMENT  Assessment/Changes in Function:     Patient noted reduced pain and improved mobility following PT session. Encouraged pt to ambulate around home with timer to assess activity tolerance and progress towards PT goals. Patient acknowledged understanding      []  See Progress Note/Recertification   Patient will continue to benefit from skilled PT services to modify and progress therapeutic interventions, address functional mobility deficits, address ROM deficits, address strength deficits, analyze and address soft tissue restrictions, analyze and cue movement patterns, analyze and modify body mechanics/ergonomics and assess and modify postural abnormalities to attain remaining goals. Progress toward goals / Updated goals:    · Long Term Goals:  To be accomplished  LTG1 Pt to improve FOTO score by 15+ points indicating improved function in daily tasks  LTG2 Pt to improve lumbar AROM extension to 5 deg, without increase in pain to allow improved function in the home  LTG3 Pt to demonstrate 10 minutes of walking in clinic and report 20 minutes in the community without increase in pain to indicate improved community engagement.  Progressing 05/18; able to ambulate 4 minutes around clinic        PLAN  [x]  Upgrade activities as tolerated YES Continue plan of care   []  Discharge due to :    []  Other:      Therapist: MARGARETTE Conte    Date: 5/18/2021 Time: 1:44 PM     Future Appointments   Date Time Provider Chaparrita Kuhn   5/18/2021 10:45 AM Mirza Romero [facility]   5/21/2021 10:15 AM Mirza Romero [facility]   5/25/2021 10:30 AM Twin [place]ip[place] [address] [facility]   5/28/2021  9:30 AM Twin Gipson [address] [facility]   6/1/2021 10:45 AM [facility] [address]   6/4/2021 10:15 AM Twin Gipson [address] [facility]   6/8/2021 10:30 AM Twin Gipson [address] [facility]

## 2021-05-21 ENCOUNTER — HOSPITAL ENCOUNTER (OUTPATIENT)
Dept: PHYSICAL THERAPY | Age: 71
Discharge: HOME | End: 2021-05-21
Payer: MEDICARE

## 2021-05-21 PROCEDURE — 97116 GAIT TRAINING THERAPY: CPT

## 2021-05-21 PROCEDURE — 97140 MANUAL THERAPY 1/> REGIONS: CPT

## 2021-05-21 PROCEDURE — 97110 THERAPEUTIC EXERCISES: CPT

## 2021-05-21 PROCEDURE — 97014 ELECTRIC STIMULATION THERAPY: CPT

## 2021-05-21 NOTE — PROGRESS NOTES
PHYSICAL THERAPY - DAILY TREATMENT NOTE    Patient Name: Elina Sanchez        Date: 2021  : 1950   YES Patient  Verified  Visit #:     Insurance: Payor: Teresita Machuca / Plan: [facility] [employer] MEDICARE CHOICE PPO/PFFS / Product Type: Managed Care Medicare /      In time: 10:08 Out time: 11:10   Total Treatment Time: 62     Medicare/[facility] Time Tracking (below)   Total Timed Codes (min):  52 1:1 Treatment Time:  52     TREATMENT AREA =  Low back pain [M54.5]    SUBJECTIVE  Pain Level (on 0 to 10 scale): 6  / 10   Medication Changes/New allergies or changes in medical history, any new surgeries or procedures? NO If yes, update Summary List   Subjective Functional Status/Changes:  []  No changes reported     Patient reports being a solid 6/10 today because she walked 20 minutes around [facility] yesterday. OBJECTIVE  Modalities Rationale:     decrease inflammation, decrease pain and increase tissue extensibility to improve patient's ability to stand upright and walk  10 min [x] Estim, type/location: P-Mod to L S/L to R glute and R hip flexor                                    []  att     [x]  unatt     []  w/US     [x]  w/ice    []  w/heat    min []  Mechanical Traction: type/lbs                   []  pro   []  sup   []  int   []  cont    []  before manual    []  after manual    min []  Ice     []  Heat    location/position:     min []  Vasopneumatic Device, press/temp:     min []  Other:    [x] Skin assessment post-treatment (if applicable):    [x]  intact    []  redness- no adverse reaction     []redness  adverse reaction:        22 min Therapeutic Exercise:  [x]  See flow sheet   Rationale:      increase ROM, increase strength and improve coordination to improve the patients ability to stand upright, extend the hip and trunk     15 min Gait Trainin minutes of ambulation with single point cane on level surfaces with stand by level of assistance.  Sidestepping and retro walking in parallel bars (3x)    Rationale: To improve ambulation safety and efficiency in order to improve patient's ability to safely ambulate at home for self care. 15 min Manual Therapy: L sidelying: DTM/STM Right QL, R TFL, and R glute med; Hip ext stretch. Supine: STM to R iliopsoas and R RF   Rationale:      decrease pain, increase ROM and increase tissue extensibility to improve patient's ability to stand upright, extend the hip and trunk  The manual therapy interventions were performed at a separate and distinct time from the therapeutic activities interventions. Billed With/As:   [x] TE   [] TA   [] Neuro   [] Self Care Patient Education: [x] Review HEP    [] Progressed/Changed HEP based on:   [] Addressed barriers and behaviors     [] Therapeutic Neuroscience Pain Education, metaphor, reframing, contexts. [] Sleep Education   [] Body Mechanics [] Healing Timeframe     [] Self STM with ball at \"the spot\"  [x] Walking Program/Global Activity   [] other:      Other Objective/Functional Measures:    1:1 TE + GT = 37'    *able to ambulate 6 minutes before elevation in R LE pain. Required standing rest break (performed BORIS at wall to reduce symptoms)  *added NuStep to improve activity tolerance/endurance   *moderate hypertonicity with increase tenderness in iliopsoas and RF noted during MT     Post Treatment Pain Level (on 0 to 10) scale:  5  / 10     ASSESSMENT  Assessment/Changes in Function:     Educated pt at length about activity pacing and performing stretches during prolonged activity. Reviewed HEP to assure proper technique during rest breaks.       []  See Progress Note/Recertification   Patient will continue to benefit from skilled PT services to modify and progress therapeutic interventions, address functional mobility deficits, address ROM deficits, address strength deficits, analyze and address soft tissue restrictions, analyze and cue movement patterns, analyze and modify body mechanics/ergonomics and assess and modify postural abnormalities to attain remaining goals. Progress toward goals / Updated goals:    · Long Term Goals: To be accomplished  LTG1 Pt to improve FOTO score by 15+ points indicating improved function in daily tasks  LTG2 Pt to improve lumbar AROM extension to 5 deg, without increase in pain to allow improved function in the home  LTG3 Pt to demonstrate 10 minutes of walking in clinic and report 20 minutes in the community without increase in pain to indicate improved community engagement.  Progressing 05/21; able to ambulate 6 minutes around clinic before onset of R LE pain       PLAN  [x]  Upgrade activities as tolerated YES Continue plan of care   []  Discharge due to :    []  Other:      Therapist: MARGARETTE Conte    Date: 5/21/2021 Time: 12:40 PM     Future Appointments   Date Time Provider Chaparrita Kuhn   5/21/2021 10:15 AM Mirza Romero [facility]   5/25/2021 10:30 AM [facility]   5/28/2021  9:30 AM [facility]   6/1/2021 10:45 AM [facility] [address]   6/4/2021 10:15 AM [facility]   6/8/2021 10:30 AM [facility]

## 2021-05-25 ENCOUNTER — HOSPITAL ENCOUNTER (OUTPATIENT)
Dept: PHYSICAL THERAPY | Age: 71
Discharge: HOME | End: 2021-05-25
Payer: MEDICARE

## 2021-05-25 PROCEDURE — 97140 MANUAL THERAPY 1/> REGIONS: CPT

## 2021-05-25 PROCEDURE — 97116 GAIT TRAINING THERAPY: CPT

## 2021-05-25 PROCEDURE — 97014 ELECTRIC STIMULATION THERAPY: CPT

## 2021-05-25 PROCEDURE — 97110 THERAPEUTIC EXERCISES: CPT

## 2021-05-25 NOTE — PROGRESS NOTES
PHYSICAL THERAPY - DAILY TREATMENT NOTE    Patient Name: Cordell Nageotte        Date: 2021  : 1950   YES Patient  Verified  Visit #:     Insurance: Payor: Zee Bills / Plan: [facility] HUMANA MEDICARE CHOICE PPO/PFFS / Product Type: Managed Care Medicare /      In time: 10:30 Out time: 11:24   Total Treatment Time: 54     Medicare/[facility] Time Tracking (below)   Total Timed Codes (min):  44 1:1 Treatment Time:  44     TREATMENT AREA =  Low back pain [M54.5]    SUBJECTIVE  Pain Level (on 0 to 10 scale): 5 / 10   Medication Changes/New allergies or changes in medical history, any new surgeries or procedures? NO If yes, update Summary List   Subjective Functional Status/Changes:  []  No changes reported     Patient reports doing a lot over the weekend so she was standing and walking a lot. OBJECTIVE  Modalities Rationale:     decrease inflammation, decrease pain and increase tissue extensibility to improve patient's ability to stand upright and walk  10 min [x] Estim, type/location: P-Mod to L S/L to R glute and R hip flexor                                    []  att     [x]  unatt     []  w/US     [x]  w/ice    []  w/heat    min []  Mechanical Traction: type/lbs                   []  pro   []  sup   []  int   []  cont    []  before manual    []  after manual    min []  Ice     []  Heat    location/position:     min []  Vasopneumatic Device, press/temp:     min []  Other:    [x] Skin assessment post-treatment (if applicable):    [x]  intact    []  redness- no adverse reaction     []redness  adverse reaction:        20 min Therapeutic Exercise:  [x]  See flow sheet   Rationale:      increase ROM, increase strength and improve coordination to improve the patients ability to stand upright, extend the hip and trunk     10 min Gait Training:   ambulation around clinic with single point cane on level surfaces with stand by level of assistance.  Sidestepping and retro walking in parallel bars (4x). Long step in hallway (2x)    Rationale: To improve ambulation safety and efficiency in order to improve patient's ability to safely ambulate at home for self care. 14 min Manual Therapy: L sidelying: DTM/STM to R TFL, and R glute med;  Supine: STM to R iliopsoas and R RF   Rationale:      decrease pain, increase ROM and increase tissue extensibility to improve patient's ability to stand upright, extend the hip and trunk  The manual therapy interventions were performed at a separate and distinct time from the therapeutic activities interventions. Billed With/As:   [x] TE   [] TA   [] Neuro   [] Self Care Patient Education: [x] Review HEP    [] Progressed/Changed HEP based on:   [] Addressed barriers and behaviors     [] Therapeutic Neuroscience Pain Education, metaphor, reframing, contexts. [] Sleep Education   [] Body Mechanics [] Healing Timeframe     [] Self STM with ball at \"the spot\"  [x] Walking Program/Global Activity   [] other:      Other Objective/Functional Measures:    1:1 TE + GT = 30'    *need to adjust SPC to correct height to prevent patient from leaning to L side during ambulation   *added long steps down hallway to improve gait mechanics   *added theraband around knees during sit to stands to improve strength     Post Treatment Pain Level (on 0 to 10) scale:  4  / 10     ASSESSMENT  Assessment/Changes in Function:     Patient noted reduced pain and improved ability to stand upright during balance exercises.  Patient reports having L hip OA and R hip \"sciatica\" and RA which could contribute to patient's reduction in activity tolerance and reduced LE strength     []  See Progress Note/Recertification   Patient will continue to benefit from skilled PT services to modify and progress therapeutic interventions, address functional mobility deficits, address ROM deficits, address strength deficits, analyze and address soft tissue restrictions, analyze and cue movement patterns, analyze and modify body mechanics/ergonomics and assess and modify postural abnormalities to attain remaining goals. Progress toward goals / Updated goals:    · Long Term Goals: To be accomplished  LTG1 Pt to improve FOTO score by 15+ points indicating improved function in daily tasks  LTG2 Pt to improve lumbar AROM extension to 5 deg, without increase in pain to allow improved function in the home  LTG3 Pt to demonstrate 10 minutes of walking in clinic and report 20 minutes in the community without increase in pain to indicate improved community engagement.  Progressing 05/21; able to ambulate 6 minutes around clinic before onset of R LE pain       PLAN  [x]  Upgrade activities as tolerated YES Continue plan of care   []  Discharge due to :    []  Other:      Therapist: Dontrell Chowdhury    Date: 5/25/2021 Time: 11:55 AM     Future Appointments   Date Time Provider Chaparrita Kuhn   5/28/2021  9:30 AM Haresh Martell [facility]   [facility]   6/4/2021 10:15 AM [facility]   6/8/2021 10:30 AM [facility]

## 2021-05-28 ENCOUNTER — HOSPITAL ENCOUNTER (OUTPATIENT)
Dept: PHYSICAL THERAPY | Age: 71
Discharge: HOME | End: 2021-05-28
Payer: MEDICARE

## 2021-05-28 PROCEDURE — 97014 ELECTRIC STIMULATION THERAPY: CPT

## 2021-05-28 PROCEDURE — 97140 MANUAL THERAPY 1/> REGIONS: CPT

## 2021-05-28 PROCEDURE — 97116 GAIT TRAINING THERAPY: CPT

## 2021-05-28 NOTE — PROGRESS NOTES
PHYSICAL THERAPY - DAILY TREATMENT NOTE    Patient Name: Link Mccormick        Date: 2021  : 1950   YES Patient  Verified  Visit #:     Insurance: Payor: Ishmael Charles / Plan: [facility] HUMANA MEDICARE CHOICE PPO/PFFS / Product Type: Managed Care Medicare /      In time: 9:32 Out time: 10:19   Total Treatment Time: 47     Medicare/[facility] Time Tracking (below)   Total Timed Codes (min):  37 1:1 Treatment Time:  37     TREATMENT AREA =  Low back pain [M54.5]    SUBJECTIVE  Pain Level (on 0 to 10 scale): 5 / 10   Medication Changes/New allergies or changes in medical history, any new surgeries or procedures? NO If yes, update Summary List   Subjective Functional Status/Changes:  []  No changes reported     Patient reports only noticing pain in the front of the R hip. States that she has been doing her exercises and taking it easy so it doesn't feel as bad as it did like LV.         OBJECTIVE  Modalities Rationale:     decrease inflammation, decrease pain and increase tissue extensibility to improve patient's ability to stand upright and walk  10 min [x] Estim, type/location: P-Mod to L S/L to R glute and R hip flexor                                    []  att     [x]  unatt     []  w/US     [x]  w/ice    []  w/heat    min []  Mechanical Traction: type/lbs                   []  pro   []  sup   []  int   []  cont    []  before manual    []  after manual    min []  Ice     []  Heat    location/position:     min []  Vasopneumatic Device, press/temp:     min []  Other:    [x] Skin assessment post-treatment (if applicable):    [x]  intact    []  redness- no adverse reaction     []redness  adverse reaction:        7NC min Therapeutic Exercise:  [x]  See flow sheet   Rationale:      increase ROM, increase strength and improve coordination to improve the patients ability to stand upright, extend the hip and trunk     20 min Gait Training:  3 minutes of ambulation around clinic with single point cane on level surfaces with stand by level of assistance. Sidestepping and retro walking outside of parallel bars with SBA15 steps/ea). Long step in hallway (2x50 feet) . Small arturo negotiation reciprocally in // bars with L UE only (4x)    Rationale: To improve ambulation safety and efficiency in order to improve patient's ability to safely ambulate at home for self care. 10 min Manual Therapy: L sidelying: DTM/STM to R TFL, and R glute med;  Supine: STM to R iliopsoas and R RF   Rationale:      decrease pain, increase ROM and increase tissue extensibility to improve patient's ability to stand upright, extend the hip and trunk  The manual therapy interventions were performed at a separate and distinct time from the therapeutic activities interventions. Billed With/As:   [x] AJ   [] ERIC   [] Neuro   [] Self Care Patient Education: [x] Review HEP    [] Progressed/Changed HEP based on:   [] Addressed barriers and behaviors     [] Therapeutic Neuroscience Pain Education, metaphor, reframing, contexts. [] Sleep Education   [] Body Mechanics [] Healing Timeframe     [] Self STM with ball at \"the spot\"  [x] Walking Program/Global Activity   [] other:      Other Objective/Functional Measures:    *demo increase chelsea and speed with ambulation around clinic with SPC  *required min cues for full upright posture during sit to stands   *able to negotiate small hurdles with min cues to avoid hip circumduction     Post Treatment Pain Level (on 0 to 10) scale:  4  / 10     ASSESSMENT  Assessment/Changes in Function:     Patient continues to note reduced pain following PT sessions.  Will continue to progress PT program to continue to improve core/LE strength/stability for improved activity tolerance      []  See Progress Note/Recertification   Patient will continue to benefit from skilled PT services to modify and progress therapeutic interventions, address functional mobility deficits, address ROM deficits, address strength deficits, analyze and address soft tissue restrictions, analyze and cue movement patterns, analyze and modify body mechanics/ergonomics and assess and modify postural abnormalities to attain remaining goals. Progress toward goals / Updated goals:    · Long Term Goals: To be accomplished  LTG1 Pt to improve FOTO score by 15+ points indicating improved function in daily tasks  LTG2 Pt to improve lumbar AROM extension to 5 deg, without increase in pain to allow improved function in the home  LTG3 Pt to demonstrate 10 minutes of walking in clinic and report 20 minutes in the community without increase in pain to indicate improved community engagement.  Progressing 05/21; able to ambulate 6 minutes around clinic before onset of R LE pain       PLAN  [x]  Upgrade activities as tolerated YES Continue plan of care   []  Discharge due to :    []  Other:      Therapist: MARGARETTE Cavazos    Date: 5/28/2021 Time: 10:34 AM     Future Appointments   Date Time Provider Chaparrita Kuhn   5/28/2021  9:30 AM Sinda Gravbrittany [facility]   6/1/2021 10:45 AM Gemma Rolly [address]   6/4/2021 10:15 AM [facility]   6/8/2021 10:30 AM [facility]

## 2021-06-01 ENCOUNTER — APPOINTMENT (OUTPATIENT)
Dept: PHYSICAL THERAPY | Age: 71
End: 2021-06-01
Payer: MEDICARE

## 2021-06-04 ENCOUNTER — HOSPITAL ENCOUNTER (OUTPATIENT)
Dept: PHYSICAL THERAPY | Age: 71
End: 2021-06-04
Payer: MEDICARE

## 2021-06-08 ENCOUNTER — HOSPITAL ENCOUNTER (OUTPATIENT)
Dept: PHYSICAL THERAPY | Age: 71
Discharge: HOME | End: 2021-06-08
Payer: MEDICARE

## 2021-06-08 PROCEDURE — 97116 GAIT TRAINING THERAPY: CPT

## 2021-06-08 PROCEDURE — 97140 MANUAL THERAPY 1/> REGIONS: CPT

## 2021-06-08 PROCEDURE — 97014 ELECTRIC STIMULATION THERAPY: CPT

## 2021-06-08 NOTE — PROGRESS NOTES
PHYSICAL THERAPY - DAILY TREATMENT NOTE    Patient Name: Rajesh Swift        Date: 2021  : 1950   YES Patient  Verified  Visit #:     Insurance: Payor: Johanna Cramer / Plan: [facility] HUMANA MEDICARE CHOICE PPO/PFFS / Product Type: Managed Care Medicare /      In time: 10:30 Out time: 11:18   Total Treatment Time: 48     Medicare/[facility] Time Tracking (below)   Total Timed Codes (min):  38 1:1 Treatment Time:  38     TREATMENT AREA =  Low back pain [M54.5]    SUBJECTIVE  Pain Level (on 0 to 10 scale): 8 / 10   Medication Changes/New allergies or changes in medical history, any new surgeries or procedures? NO If yes, update Summary List   Subjective Functional Status/Changes:  []  No changes reported     Patient was unable to attend PT last week due to feeling ill. Pt reports more pain today in the R groin. States that she began noticing pain in the R groin over this past weekend. Reports trying to do her HEP 3x/day \"because I thought doing more would get me better faster\"   States that she went to the wedding the weekend of the . Took her w/c and cane, barely walked when she was there, but still moved.   SEE PN/RECERT       OBJECTIVE  Modalities Rationale:     decrease inflammation, decrease pain and increase tissue extensibility to improve patient's ability to stand upright and walk  10 min [x] Estim, type/location: P-Mod to L S/L to R glute and R hip flexor                                    []  att     [x]  unatt     []  w/US     [x]  w/ice    []  w/heat    min []  Mechanical Traction: type/lbs                   []  pro   []  sup   []  int   []  cont    []  before manual    []  after manual    min []  Ice     []  Heat    location/position:     min []  Vasopneumatic Device, press/temp:     min []  Other:    [x] Skin assessment post-treatment (if applicable):    [x]  intact    []  redness- no adverse reaction     []redness  adverse reaction:        - min Therapeutic Exercise:  [x] See flow sheet   Rationale:      increase ROM, increase strength and improve coordination to improve the patients ability to stand upright, extend the hip and trunk     10 min Gait Training:  3 minutes of ambulation around clinic with single point cane on level surfaces with stand by level of assistance. Parallel bars: sidestepping and retro walking, small arturo negotiation NR forward with L hand on bars, small arturo negotiation NR lateral  Long step in hallway with FWW (2x50 feet) . Rationale: To improve ambulation safety and efficiency in order to improve patient's ability to safely ambulate at home for self care. 20 min Manual Therapy: L sidelying: DTM/STM to R TFL, and R glute med;  Supine: STM to R iliopsoas and R RF   Rationale:      decrease pain, increase ROM and increase tissue extensibility to improve patient's ability to stand upright, extend the hip and trunk  The manual therapy interventions were performed at a separate and distinct time from the therapeutic activities interventions. [address] min Self Care: Reassessment. Educated pt on activity pacing. Reviewed HEP (emphasis on daily and weekly frequency to prevent exacerbation of symptoms)    Rationale:  to improve understanding of current diagnosis with realistic expectation of PT to improve compliance/adherence and satisfaction        Billed With/As:   [x] TE   [] TA   [] Neuro   [] Self Care Patient Education: [x] Review HEP    [] Progressed/Changed HEP based on:   [] Addressed barriers and behaviors     [] Therapeutic Neuroscience Pain Education, metaphor, reframing, contexts.     [] Sleep Education   [] Body Mechanics [] Healing Timeframe     [] Self STM with ball at \"the spot\"  [x] Walking Program/Global Activity   [] other:      Other Objective/Functional Measures:    SEE PN/RECERT   Post Treatment Pain Level (on 0 to 10) scale:  6  / 10     ASSESSMENT  Assessment/Changes in Function:     SEE PN/RECERT     []  See Progress Note/Recertification   Patient will continue to benefit from skilled PT services to modify and progress therapeutic interventions, address functional mobility deficits, address ROM deficits, address strength deficits, analyze and address soft tissue restrictions, analyze and cue movement patterns, analyze and modify body mechanics/ergonomics and assess and modify postural abnormalities to attain remaining goals.    Progress toward goals / Updated goals:    SEE PN/RECERT       PLAN  [x]  Upgrade activities as tolerated YES Continue plan of care   []  Discharge due to :    []  Other:      Therapist: Halima Gudino    Date: 6/8/2021 Time: 11:58 AM     Future Appointments   Date Time Provider Chaparrita Kuhn   6/8/2021 10:30 AM Roderick Knowles

## 2021-06-08 NOTE — PROGRESS NOTES
[address], [facility] [address],[facility], [address] [facility] - Phone: [phone]  Fax: [phone] OF CARE/RECERTIFICATION FOR PHYSICAL THERAPY          Patient Name: Rajesh Swift : 1950   Treatment/Medical Diagnosis: Low back pain [M54.5]   Onset Date: 2020    Referral Source: Leona Baeza MD Start of Care [facility]): 21   Prior Hospitalization: See Medical History Provider #: [id number]   Prior Level of Function: Was in [facility] prior to surgery, was able to walk and stand and transfer without pain prior to injury   Comorbidities: RA, minieres, prior surgery, back pain, [id number], HTN,    Medications: Verified on Patient Summary List   Visits from [facility]: 8 Missed Visits: 0     Long Term Goals: To be accomplished in  3  weeks  LTG1 Pt to improve FOTO score by 15+ points indicating improved function in daily tasks              Status at last note/certification:               Current status: 38/100              Goal met?: no, regression. LTG2 Pt to improve lumbar AROM extension to 5 deg, without increase in pain to allow improved function in the home              Status at last note/certification: -10 deg              Current status: -5 deg              Goal met?: progressing    LTG3 Pt to demonstrate 10 minutes of walking in clinic and report 20 minutes in the community without increase in pain to indicate improved community engagement. Status at last note/certification: 5 minutes of walking before onset of pain              Current status: 10 minutes of walking with FWW in the clinic, 7 minutes in the community              Goal met?:partially met    Key Functional Changes/Progress: Patient was making good progress with PT interventions for LBP with R LE pain.  She missed last week's PT sessions due to being out of town and returned today with elevated pain (8/10), primarily in the R lateral hip into the R groin upon immediate weightbearing. Contributes elevated pain to \"doing HEP 3x/day every day to get better faster\". Prior to this week, patient's max pain was 6/10 with prolonged walking and standing. Patient has been advised to return to using FWW/rollator to prevent further exacerbation of symptoms. Current objective findings: ambulation with SPC: increase forward/ with slight L lateral trunk lean, decrease step length, decrease R LE stance time; ambulation with FWW: slight forward trunk, improved step length; moderate hypertonicity with significant tenderness in R iliopsoas and R RF     Patient's insurance has authorized limited visits, therefore may limit patient's future PT attendance. Insurance has authorized 12 visits until 7/10/21. Patient has 3 remaining visits. Patient would continue to benefit from skilled PT, primarily to emphasize patient education to self manage symptoms and assure compliance with HEP in prep for D/C to home. Problem List: pain affecting function, decrease ROM, decrease strength, impaired gait/ balance, decrease ADL/ functional abilitiies, decrease activity tolerance, decrease flexibility/ joint mobility and decrease transfer abilities   Treatment Plan may include any combination of the following: Therapeutic exercise, Therapeutic activities, Neuromuscular re-education, Physical agent/modality, Manual therapy, Patient education, Self Care training, Functional mobility training, Home safety training and Stair training  Patient Goal(s) has been updated and includes:     Goals for this certification period are to be achieved in  3 more visits. Frequency / Duration:   Patient to be seen   2   times per week for   3 more visits    Assessments/Recommendations: Continue towards goals  If you have any questions/comments please contact us directly at [phone]. Thank you for allowing us to assist in the care of your patient.     Therapist Signature: Reyes Homans, MARGARETTE Suarez PT DPT [facility] Date: 4/7/5062   Certification Period:  Reporting Period: 4/28/21 - 7/27/21 5/7/21 - 6/8/21 Time: 2:24 PM   NOTE TO PHYSICIAN:  PLEASE COMPLETE THE ORDERS BELOW AND FAX TO   [facility]: [phone]  If you are unable to process this request in 24 hours please contact our office: [phone]    ___ I have read the above report and request that my patient continue as recommended.   ___ I have read the above report and request that my patient continue therapy with the following changes/special instructions:_________________________________________________________   ___ I have read the above report and request that my patient be discharged from therapy.      Physician Signature:        Date:       Time:                                 Merissa Rhodes MD

## 2021-06-17 ENCOUNTER — HOSPITAL ENCOUNTER (OUTPATIENT)
Dept: PHYSICAL THERAPY | Age: 71
Discharge: HOME | End: 2021-06-17
Payer: MEDICARE

## 2021-06-17 PROCEDURE — 97110 THERAPEUTIC EXERCISES: CPT

## 2021-06-17 PROCEDURE — 97014 ELECTRIC STIMULATION THERAPY: CPT

## 2021-06-17 PROCEDURE — 97140 MANUAL THERAPY 1/> REGIONS: CPT

## 2021-06-17 NOTE — PROGRESS NOTES
PHYSICAL THERAPY - DAILY TREATMENT NOTE    Patient Name: Joanne Abel        Date: 2021  : 1950   YES Patient  Verified  Visit #:     Insurance: Payor: Yoselin Crow / Plan: [facility] HUMANA MEDICARE CHOICE PPO/PFFS / Product Type: Managed Care Medicare /      In time: 2:38 Out time: 3:29   Total Treatment Time: 51     Medicare/[facility] Time Tracking (below)   Total Timed Codes (min):  41 1:1 Treatment Time:  41     TREATMENT AREA =  Low back pain [M54.5]    SUBJECTIVE  Pain Level (on 0 to 10 scale): 8 / 10   Medication Changes/New allergies or changes in medical history, any new surgeries or procedures? NO If yes, update Summary List   Subjective Functional Status/Changes:  []  No changes reported     Patient reports seeing one of her MDs and per pt, MD stated that her R hip has more arthritis causing her more pain. MD increase dose of arthritis medication to help with the pain. Reports her low back pain has been okay.        OBJECTIVE  Modalities Rationale:     decrease inflammation, decrease pain and increase tissue extensibility to improve patient's ability to stand upright and walk  10 min [x] Estim, type/location: IFC to L S/L to R anterolateral hip in L sidelying                                    []  att     [x]  unatt     []  w/US     [x]  w/ice    []  w/heat    min []  Mechanical Traction: type/lbs                   []  pro   []  sup   []  int   []  cont    []  before manual    []  after manual    min []  Ice     []  Heat    location/position:     min []  Vasopneumatic Device, press/temp:     min []  Other:    [x] Skin assessment post-treatment (if applicable):    [x]  intact    []  redness- no adverse reaction     []redness  adverse reaction:        16 min Therapeutic Exercise:  [x]  See flow sheet   Rationale:      increase ROM, increase strength and improve coordination to improve the patients ability to stand upright, extend the hip and trunk     10 min Manual Therapy: Supine with 3 pillows: STM to R iliopsoas and R RF   Rationale:      decrease pain, increase ROM and increase tissue extensibility to improve patient's ability to stand upright, extend the hip and trunk  The manual therapy interventions were performed at a separate and distinct time from the therapeutic activities interventions. 15  NC min Self Care: Educated pt on use of rolling stick/rolling pin to manage muscle aches in anterior thigh. Issued and reviewed updated HEP . Rationale:  to improve understanding of current diagnosis with realistic expectation of PT to improve compliance/adherence and satisfaction        Billed With/As:   [x] TE   [] TA   [] Neuro   [] Self Care Patient Education: [x] Review HEP    [] Progressed/Changed HEP based on:   [] Addressed barriers and behaviors     [] Therapeutic Neuroscience Pain Education, metaphor, reframing, contexts. [] Sleep Education   [] Body Mechanics [] Healing Timeframe     [] Self STM with ball at \"the spot\"  [x] Walking Program/Global Activity   [] other:      Other Objective/Functional Measures:    Access Code: [email or web address]  URL: [email or web address]  Date: 06/17/2021  Prepared by: Damien Guillen    Program Notes  PERFORM ALL EXERCISES TO YOUR TOLERANCE. IF SHARP PAIN OR RED FLAGS OCCUR, IMMEDIATELY STOP EXERCISE.       Exercises  Standing Back Extension - 2 x daily - 7 x weekly - 10 reps  Side Stepping with Counter Support - 1 x daily - 4 x weekly - 6 down the length of your countertop  Standing March with Counter Support - 1 x daily - 4 x weekly - 2 sets - 10 reps  Heel rises with counter support - 1 x daily - 4 x weekly - 2 sets - 10 reps  Sit to Stand with Hands on Knees - 2 x daily - 5 x weekly - 10 reps    *modified exercises today due to patient's elevated pain (see flowsheet)     Post Treatment Pain Level (on 0 to 10) scale:  5  / 10     ASSESSMENT  Assessment/Changes in Function:     Patient would continue to benefit from skilled PT, more for education and assessing carryover with education in between remaining 3 PT sessions. However patient's symptoms present more R hip focused from progressing arthritis vs low back pain. Will continue to modify interventions per pt's tolerance. []  See Progress Note/Recertification   Patient will continue to benefit from skilled PT services to modify and progress therapeutic interventions, address functional mobility deficits, address ROM deficits, address strength deficits, analyze and address soft tissue restrictions, analyze and cue movement patterns, analyze and modify body mechanics/ergonomics and assess and modify postural abnormalities to attain remaining goals. Progress toward goals / Updated goals:    · Long Term Goals: To be accomplished in  3  weeks  LTG1 Pt to improve FOTO score by 15+ points indicating improved function in daily tasks              Status at last note/certification: 37/078  LTG2 Pt to improve lumbar AROM extension to 5 deg, without increase in pain to allow improved function in the home              Status at last note/certification: -5 deg  LTG3 Pt to demonstrate 10 minutes of walking in clinic and report 20 minutes in the community without increase in pain to indicate improved community engagement.               Status at last note/certification: 10 minutes of walking with FWW in the clinic, 7 minutes in the community       PLAN  [x]  Upgrade activities as tolerated YES Continue plan of care   []  Discharge due to :    []  Other:      Therapist: Jules Mondragon    Date: 6/17/2021 Time: 3:47 PM     Future Appointments   Date Time Provider Chaparrita Kuhn   6/17/2021  2:45 PM Angelic Dimas [facility]   6/24/2021  2:45 PM Sunny Collazo [facility]   7/1/2021  2:45 PM Dirk Cockayne

## 2021-06-24 ENCOUNTER — HOSPITAL ENCOUNTER (OUTPATIENT)
Dept: PHYSICAL THERAPY | Age: 71
Discharge: HOME | End: 2021-06-24
Payer: MEDICARE

## 2021-06-24 PROCEDURE — 97140 MANUAL THERAPY 1/> REGIONS: CPT

## 2021-06-24 PROCEDURE — 97110 THERAPEUTIC EXERCISES: CPT

## 2021-06-24 NOTE — PROGRESS NOTES
PHYSICAL THERAPY - DAILY TREATMENT NOTE    Patient Name: Moi Hunter        Date: 2021  : 1950   YES Patient  Verified  Visit #:   10   of   12  Insurance: Payor: Nena Co / Plan: [facility] HUMANA MEDICARE CHOICE PPO/PFFS / Product Type: Managed Care Medicare /      In time: 2:46 Out time: 3:35   Total Treatment Time: 49     Medicare/[employer] Time Tracking (below)   Total Timed Codes (min):  39 1:1 Treatment Time:  39     TREATMENT AREA =  Low back pain [M54.5]    SUBJECTIVE  Pain Level (on 0 to 10 scale): 2-3 10   Medication Changes/New allergies or changes in medical history, any new surgeries or procedures? NO If yes, update Summary List   Subjective Functional Status/Changes:  []  No changes reported     Patient reports not feeling too bad in the back. States that she has been doing her HEP and doing it every other day vs every day. Reports going to see the arthritis MD   Patient also reports being able to mop part of her home. Mopped for 10 consecutive minutes without back pain.         OBJECTIVE  Modalities Rationale:     decrease inflammation, decrease pain and increase tissue extensibility to improve patient's ability to stand upright and walk   min [] Estim, type/location:                                     []  att     []  unatt     []  w/US     []  w/ice    []  w/heat    min []  Mechanical Traction: type/lbs                   []  pro   []  sup   []  int   []  cont    []  before manual    []  after manual   10 Min [x]  Ice     []  Heat    Location/position: To R hip in L sidelying with 1 pillow in between knees post-session    min []  Vasopneumatic Device, press/temp:     min []  Other:    [x] Skin assessment post-treatment (if applicable):    [x]  intact    []  redness- no adverse reaction     []redness  adverse reaction:        29 min Therapeutic Exercise:  [x]  See flow sheet   Rationale:      increase ROM, increase strength and improve coordination to improve the patients ability to stand upright, extend the hip and trunk     10 min Manual Therapy: Supine with 3 pillows: STM to R iliopsoas and R RF   Rationale:      decrease pain, increase ROM and increase tissue extensibility to improve patient's ability to stand upright, extend the hip and trunk  The manual therapy interventions were performed at a separate and distinct time from the therapeutic activities interventions. Billed With/As:   [x] TE   [] TA   [] Neuro   [] Self Care Patient Education: [x] Review HEP    [] Progressed/Changed HEP based on:   [] Addressed barriers and behaviors     [] Therapeutic Neuroscience Pain Education, metaphor, reframing, contexts. [] Sleep Education   [] Body Mechanics [] Healing Timeframe     [] Self STM with ball at \"the spot\"  [x] Walking Program/Global Activity   [] other:      Other Objective/Functional Measures:    *walked on TM for 6 minutes at 1.0 mph. Heavy cues for upright position and increase stride length  *increase resistance on NuStep to improve LE strength   *performed marches, sidestepping, and retro walking outside of parallel bars to improve functional endurance, strength, and mobility     Post Treatment Pain Level (on 0 to 10) scale:  0  / 10     ASSESSMENT  Assessment/Changes in Function:     Patient noted no pain in the low back following PT session. Educated pt on performing marches or weight shifts during prolonged static standing activities to reduce c/o back pain. Patient appropriate to transition toward DC at NV if pain levels continue to be low and are managed with HEP.       []  See Progress Note/Recertification   Patient will continue to benefit from skilled PT services to modify and progress therapeutic interventions, address functional mobility deficits, address ROM deficits, address strength deficits, analyze and address soft tissue restrictions, analyze and cue movement patterns, analyze and modify body mechanics/ergonomics and assess and modify postural abnormalities to attain remaining goals. Progress toward goals / Updated goals:    · Long Term Goals: To be accomplished in  3  weeks  LTG1 Pt to improve FOTO score by 15+ points indicating improved function in daily tasks              Status at last note/certification: 13/564  LTG2 Pt to improve lumbar AROM extension to 5 deg, without increase in pain to allow improved function in the home              Status at last note/certification: -5 deg  LTG3 Pt to demonstrate 10 minutes of walking in clinic and report 20 minutes in the community without increase in pain to indicate improved community engagement.               Status at last note/certification: 10 minutes of walking with FWW in the clinic, 7 minutes in the community progressing 06/24; able to walk 6 minutes on TM        PLAN  [x]  Upgrade activities as tolerated YES Continue plan of care   []  Discharge due to :    []  Other:      Therapist: Dallin Griffith    Date: 6/24/2021 Time: 3:56 PM     Future Appointments   Date Time Provider hCaparrita Kuhn   7/1/2021  2:45 PM Tyler Lester

## 2021-07-01 ENCOUNTER — HOSPITAL ENCOUNTER (OUTPATIENT)
Dept: PHYSICAL THERAPY | Age: 71
Discharge: HOME | End: 2021-07-01
Payer: MEDICARE

## 2021-07-01 PROCEDURE — 97116 GAIT TRAINING THERAPY: CPT

## 2021-07-01 PROCEDURE — 97140 MANUAL THERAPY 1/> REGIONS: CPT

## 2021-07-01 PROCEDURE — 97535 SELF CARE MNGMENT TRAINING: CPT

## 2021-07-01 NOTE — PROGRESS NOTES
PHYSICAL THERAPY - DAILY TREATMENT NOTE    Patient Name: Kari Cure        Date: 2021  : 1950   YES Patient  Verified  Visit #:    Insurance: Payor: Viktor Puente / Plan: [facility] HUMANA MEDICARE CHOICE PPO/PFFS / Product Type: Managed Care Medicare /      In time: 2:43 Out time: 3:36   Total Treatment Time: 53     Medicare/[facility] Time Tracking (below)   Total Timed Codes (min):  43 1:1 Treatment Time:  43     TREATMENT AREA =  Low back pain [M54.5]    SUBJECTIVE  Pain Level (on 0 to 10 scale):  10   Medication Changes/New allergies or changes in medical history, any new surgeries or procedures? NO If yes, update Summary List   Subjective Functional Status/Changes:  []  No changes reported     SEE D/C       OBJECTIVE  Modalities Rationale:     decrease inflammation, decrease pain and increase tissue extensibility to improve patient's ability to stand upright and walk   min [x] Estim, type/location: P                                    []  att     []  unatt     []  w/US     []  w/ice    []  w/heat    min []  Mechanical Traction: type/lbs                   []  pro   []  sup   []  int   []  cont    []  before manual    []  after manual   10 Min [x]  Ice     []  Heat    Location/position: To R lateral trunk/hip in semirecline with R knee on pillow    min []  Vasopneumatic Device, press/temp:     min []  Other:    [x] Skin assessment post-treatment (if applicable):    [x]  intact    []  redness- no adverse reaction     []redness  adverse reaction:          10 min Gait Trainin minutes on TM (1.0-1.5 mph) with use of (B) handrails. Ambulation around clinic for 4 minutes with rollator. Rationale: To improve ambulation safety and efficiency in order to improve patient's ability to safely ambulate at home for self care.     15 min Manual Therapy: Supine: IASTM/STM to R iliopsoas and R RF   Rationale:      decrease pain, increase ROM and increase tissue extensibility to improve patient's ability to stand upright, extend the hip and trunk  The manual therapy interventions were performed at a separate and distinct time from the therapeutic activities interventions. 18 min Self Care: Reassessment. Educated pt on activity pacing. Reviewed HEP and emphasized on compliance. Rationale:  to improve understanding of current diagnosis with self management of symptoms in prep for D/C        Billed With/As:   [] TE   [] TA   [] Neuro   [x] Self Care Patient Education: [x] Review HEP    [] Progressed/Changed HEP based on:   [] Addressed barriers and behaviors     [] Therapeutic Neuroscience Pain Education, metaphor, reframing, contexts. [] Sleep Education   [] Body Mechanics [] Healing Timeframe     [] Self STM with ball at \"the spot\"  [x] Walking Program/Global Activity   [] other:      Other Objective/Functional Measures:    SEE D/C   Post Treatment Pain Level (on 0 to 10) scale:  0  / 10     ASSESSMENT  Assessment/Changes in Function:     SEE D/C     []  See Progress Note/Recertification   Patient will continue to benefit from skilled PT services to modify and progress therapeutic interventions, address functional mobility deficits, address ROM deficits, address strength deficits, analyze and address soft tissue restrictions, analyze and cue movement patterns, analyze and modify body mechanics/ergonomics and assess and modify postural abnormalities to attain remaining goals.    Progress toward goals / Updated goals:    SEE D/C       PLAN  []  Upgrade activities as tolerated no Continue plan of care   [x]  Discharge due to : Completed PT program. Progressing/met PT goals   []  Other:      Therapist: MARGARETTE Yarbrough    Date: 7/1/2021 Time: 4:15 PM     Future Appointments   Date Time Provider Chaparrita Kuhn   7/1/2021  2:45 PM Darwin Gaffney

## 2021-07-01 NOTE — PROGRESS NOTES
[address], [facility] [address],[facility], [address] [facility] - Phone: [phone]  Fax: [phone] FOR PHYSICAL THERAPY          Patient Name: Shilpi Aburto : 1950   Treatment/Medical Diagnosis: Low back pain [M54.5]   Onset Date: 2020    Referral Source: Татьяна Thornton MD [facility]): 21   Prior Hospitalization: See Medical History Provider #: [id number]   Prior Level of Function: Was in [facility] prior to surgery, was able to walk and stand and transfer without pain prior to injury   Comorbidities: RA, minieres, prior surgery, back pain, [id number], HTN,    Medications: Verified on Patient Summary List   Visits from [facility]: 12 Missed Visits: 0     Long Term Goals: To be accomplished in  3  weeks  LTG1 Pt to improve FOTO score by 15+ points indicating improved function in daily tasks              Status at last note/certification: 36/590              Current status: 52/100              Goal met?: yes     LTG2 Pt to improve lumbar AROM extension to 5 deg, without increase in pain to allow improved function in the home              Status at last note/certification: -10 deg              Current status: 3 deg              Goal met?: progressing     LTG3 Pt to demonstrate 10 minutes of walking in clinic and report 20 minutes in the community without increase in pain to indicate improved community engagement. Status at last note/certification: 10 minutes of walking with FWW in the clinic, 7 minutes in the community              Current status: 10 minutes in the clinic, 30 minutes in the community before onset of pain              Goal met?: yes    Key Functional Changes/Progress: Patient has made good gains made from PT for low back and R LE pain. Patient reports a lot of improvement with low back pain and pain has ranged between 0-3/10.  Notes elevated pain level with prolonged walking (>30 minutes) and standing (>5 minutes). Patient ambulates community distances with rollator due to R hip pain. Patient is now discharged from physical therapy due to progressing/meeting PT goals and demonstrating good understanding of self management of symptoms with HEP and modalities. Thank you for this referral.     Assessments/Recommendations: Discontinue therapy. Progressing towards or have reached established goals. Thank you for this referral.     If you have any questions/comments please contact us directly at [phone]. Thank you for allowing us to assist in the care of your patient. Therapist Signature: MARGARETTE Coronel Date: 07/01/21   Reporting Period: 06/08/21 to 07/01/21 Time: 4:08 PM   NOTE TO PHYSICIAN:  Your patient's insurance requires this discharge note be signed and returned. PLEASE COMPLETE THE ORDERS BELOW AND RETURN TO:  [facility] PHYSICAL [facility]ERAP[facility]    ___ I have read the above report and request that my patient be discharged from therapy.      Physician Signature:        Date:       Time:    Fiona Weiner MD

## 2022-03-19 PROBLEM — M06.09 RHEUMATOID ARTHRITIS OF MULTIPLE SITES WITH NEGATIVE RHEUMATOID FACTOR (HCC): Status: ACTIVE | Noted: 2017-12-18

## 2022-03-19 PROBLEM — E11.21 TYPE 2 DIABETES WITH NEPHROPATHY (HCC): Status: ACTIVE | Noted: 2018-07-16

## 2022-03-19 PROBLEM — F51.04 CHRONIC INSOMNIA: Status: ACTIVE | Noted: 2018-01-15

## 2024-03-04 ENCOUNTER — TELEPHONE (OUTPATIENT)
Dept: FAMILY MEDICINE CLINIC | Facility: CLINIC | Age: 74
End: 2024-03-04

## 2024-03-04 NOTE — TELEPHONE ENCOUNTER
Spoke w/pt and pt was seen at [facility] for ear infection; pt was given steroid and ABX; pt has completed the steroid and still taking ABX until 3/7/24; pt will call if not feeling better; pt has appt on:   Future Appointments   Date Time Provider Department Center   3/18/2024 10:00 AM Damian Box DO [facility]     A referral to ENT will be discussed at that visit.

## 2024-03-04 NOTE — TELEPHONE ENCOUNTER
Pt scheduled for a new pt appt but advised she is in need of an ENT referral due to persistent infections. Let her know I would need to send a message to the clinical staff to confirm.    Future Appointments   Date Time Provider Department Center   3/18/2024 10:00 AM Damian Box DO [facility]

## 2024-03-07 NOTE — TELEPHONE ENCOUNTER
Spoke w/pt regarding request for ENT referral; pt would like to be seen sooner w/PCP; will contact pt if any cancellations.

## 2024-03-11 ENCOUNTER — OFFICE VISIT (OUTPATIENT)
Dept: FAMILY MEDICINE CLINIC | Facility: CLINIC | Age: 74
End: 2024-03-11

## 2024-03-11 ENCOUNTER — NURSE ONLY (OUTPATIENT)
Dept: FAMILY MEDICINE CLINIC | Facility: CLINIC | Age: 74
End: 2024-03-11

## 2024-03-11 VITALS
SYSTOLIC BLOOD PRESSURE: 130 MMHG | TEMPERATURE: 97.5 F | RESPIRATION RATE: 15 BRPM | HEIGHT: 61 IN | WEIGHT: 156.8 LBS | HEART RATE: 85 BPM | OXYGEN SATURATION: 97 % | BODY MASS INDEX: 29.6 KG/M2 | DIASTOLIC BLOOD PRESSURE: 62 MMHG

## 2024-03-11 DIAGNOSIS — J45.40 MODERATE PERSISTENT ASTHMA WITHOUT COMPLICATION: ICD-10-CM

## 2024-03-11 DIAGNOSIS — F51.04 CHRONIC INSOMNIA: ICD-10-CM

## 2024-03-11 DIAGNOSIS — I10 ESSENTIAL HYPERTENSION, BENIGN: ICD-10-CM

## 2024-03-11 DIAGNOSIS — H60.333 ACUTE SWIMMER'S EAR OF BOTH SIDES: ICD-10-CM

## 2024-03-11 DIAGNOSIS — M06.09 RHEUMATOID ARTHRITIS OF MULTIPLE SITES WITH NEGATIVE RHEUMATOID FACTOR (HCC): ICD-10-CM

## 2024-03-11 DIAGNOSIS — Z96.643 H/O BILATERAL HIP REPLACEMENTS: ICD-10-CM

## 2024-03-11 DIAGNOSIS — E11.9 TYPE 2 DIABETES MELLITUS WITHOUT COMPLICATION, WITHOUT LONG-TERM CURRENT USE OF INSULIN (HCC): Primary | ICD-10-CM

## 2024-03-11 DIAGNOSIS — H91.93 BILATERAL HEARING LOSS, UNSPECIFIED HEARING LOSS TYPE: ICD-10-CM

## 2024-03-11 DIAGNOSIS — E11.9 TYPE 2 DIABETES MELLITUS WITHOUT COMPLICATION, WITHOUT LONG-TERM CURRENT USE OF INSULIN (HCC): ICD-10-CM

## 2024-03-11 LAB — HBA1C MFR BLD: 6.7 %

## 2024-03-11 RX ORDER — AMLODIPINE BESYLATE 10 MG/1
10 TABLET ORAL DAILY
Qty: 90 TABLET | Refills: 1 | Status: SHIPPED | OUTPATIENT
Start: 2024-03-11

## 2024-03-11 RX ORDER — CETIRIZINE HYDROCHLORIDE 10 MG/1
10 TABLET ORAL DAILY
COMMUNITY

## 2024-03-11 RX ORDER — CLONIDINE HYDROCHLORIDE 0.1 MG/1
TABLET ORAL
Qty: 270 TABLET | Refills: 1 | Status: SHIPPED | OUTPATIENT
Start: 2024-03-11

## 2024-03-11 RX ORDER — BACLOFEN 10 MG/1
10 TABLET ORAL 3 TIMES DAILY
COMMUNITY

## 2024-03-11 RX ORDER — FLUTICASONE FUROATE, UMECLIDINIUM BROMIDE AND VILANTEROL TRIFENATATE 100; 62.5; 25 UG/1; UG/1; UG/1
1 POWDER RESPIRATORY (INHALATION) DAILY
COMMUNITY

## 2024-03-11 RX ORDER — METOPROLOL SUCCINATE 25 MG/1
25 TABLET, EXTENDED RELEASE ORAL DAILY
Qty: 90 TABLET | Refills: 1 | Status: SHIPPED | OUTPATIENT
Start: 2024-03-11

## 2024-03-11 RX ORDER — PRAVASTATIN SODIUM 20 MG
20 TABLET ORAL DAILY
COMMUNITY
End: 2024-03-11 | Stop reason: SDUPTHER

## 2024-03-11 RX ORDER — TRAZODONE HYDROCHLORIDE 150 MG/1
150 TABLET ORAL NIGHTLY
Qty: 90 TABLET | Refills: 1 | Status: SHIPPED | OUTPATIENT
Start: 2024-03-11

## 2024-03-11 RX ORDER — HYDROCHLOROTHIAZIDE 25 MG/1
25 TABLET ORAL DAILY
COMMUNITY
End: 2024-03-11 | Stop reason: SDUPTHER

## 2024-03-11 RX ORDER — AMLODIPINE BESYLATE 10 MG/1
10 TABLET ORAL DAILY
COMMUNITY
End: 2024-03-11 | Stop reason: SDUPTHER

## 2024-03-11 RX ORDER — HYDROCHLOROTHIAZIDE 25 MG/1
25 TABLET ORAL DAILY
Qty: 90 TABLET | Refills: 1 | Status: SHIPPED | OUTPATIENT
Start: 2024-03-11

## 2024-03-11 RX ORDER — LOSARTAN POTASSIUM 100 MG/1
100 TABLET ORAL DAILY
Qty: 90 TABLET | Refills: 1 | Status: SHIPPED | OUTPATIENT
Start: 2024-03-11

## 2024-03-11 RX ORDER — PRAVASTATIN SODIUM 20 MG
20 TABLET ORAL DAILY
Qty: 90 TABLET | Refills: 1 | Status: SHIPPED | OUTPATIENT
Start: 2024-03-11

## 2024-03-11 RX ORDER — ASCORBIC ACID 500 MG
500 TABLET ORAL DAILY
COMMUNITY

## 2024-03-11 SDOH — ECONOMIC STABILITY: HOUSING INSECURITY
IN THE LAST 12 MONTHS, WAS THERE A TIME WHEN YOU DID NOT HAVE A STEADY PLACE TO SLEEP OR SLEPT IN A SHELTER (INCLUDING NOW)?: NO

## 2024-03-11 SDOH — ECONOMIC STABILITY: FOOD INSECURITY: WITHIN THE PAST 12 MONTHS, THE FOOD YOU BOUGHT JUST DIDN'T LAST AND YOU DIDN'T HAVE MONEY TO GET MORE.: NEVER TRUE

## 2024-03-11 SDOH — ECONOMIC STABILITY: FOOD INSECURITY: WITHIN THE PAST 12 MONTHS, YOU WORRIED THAT YOUR FOOD WOULD RUN OUT BEFORE YOU GOT MONEY TO BUY MORE.: NEVER TRUE

## 2024-03-11 SDOH — ECONOMIC STABILITY: INCOME INSECURITY: HOW HARD IS IT FOR YOU TO PAY FOR THE VERY BASICS LIKE FOOD, HOUSING, MEDICAL CARE, AND HEATING?: NOT HARD AT ALL

## 2024-03-11 ASSESSMENT — PATIENT HEALTH QUESTIONNAIRE - PHQ9
SUM OF ALL RESPONSES TO PHQ QUESTIONS 1-9: 0
2. FEELING DOWN, DEPRESSED OR HOPELESS: 0
SUM OF ALL RESPONSES TO PHQ QUESTIONS 1-9: 0
SUM OF ALL RESPONSES TO PHQ QUESTIONS 1-9: 0
1. LITTLE INTEREST OR PLEASURE IN DOING THINGS: 0
SUM OF ALL RESPONSES TO PHQ9 QUESTIONS 1 & 2: 0
SUM OF ALL RESPONSES TO PHQ QUESTIONS 1-9: 0

## 2024-03-11 ASSESSMENT — ENCOUNTER SYMPTOMS
CONSTIPATION: 0
NAUSEA: 0
BACK PAIN: 0
COUGH: 0
SORE THROAT: 0
CHEST TIGHTNESS: 0
DIARRHEA: 0
EYE PAIN: 0
ABDOMINAL PAIN: 0
VOMITING: 0
SHORTNESS OF BREATH: 0
RHINORRHEA: 0

## 2024-03-11 NOTE — PROGRESS NOTES
[facility]      MR#: [id number]    HISTORY OF PRESENT ILLNESS  Nida Anthony  is a 73 y.o.  female who presents to establish new PCP.    A1c 6.7  Metformin 1000 mg twice daily  Previously on Actos   On losartan  No statin  Blood sugars in the 120s  Blood pressure controlled     Previous PCP: Dr. Nino  Rheumatologist: Dr. Fraire   Pulmonologist: Dr. Bethea    Past medical history:  Type 2 diabetes  Hypertension  Moderate persistent asthma, history of intubation;hospitalization  Rheumatoid arthritis  Chronic insomnia  Lower back pain  Bilateral hearing loss  Bilateral hip replacement    Social:  Tobacco use: Former  Alcohol use: Denies  Illicit drug use: Denies  Housing: Lives in [place] with    Employment: Retired     Health maintenance:  Colon cancer screening: Due in 2024  Breast cancer screening: Due in 2024  DEXA: Complete   COVID: Pfizer x 1  Influenza: Due  PCV: Due  Shingles: Due      Family History   Problem Relation Age of Onset    Diabetes Sister     Hypertension Father     Diabetes Father     Hypertension Mother     Diabetes Mother        Allergies   Allergen Reactions    Latex      Other reaction(s): Unknown (comments)    Cefdinir Rash       Social History     Tobacco Use   Smoking Status Former    Current packs/day: 0.00    Types: Cigarettes    Quit date: 1997    Years since quittin.5   Smokeless Tobacco Never       Social History     Substance and Sexual Activity   Alcohol Use No       Immunization History   Administered Date(s) Administered    COVID-19, PFIZER PURPLE top, DILUTE for use, (age 12 y+), 30mcg/0.3mL 10/12/2021    Influenza Trivalent 10/21/2014    Influenza, High Dose (Fluzone 65 yrs and older) 2015, 2017, 10/06/2021         Current Outpatient Medications:     TRELEGY ELLIPTA 100-62.5-25 MCG/ACT AEPB inhaler, Inhale 1 puff into the lungs daily, Disp: , Rfl:     vitamin C (ASCORBIC ACID) 500 MG tablet, Take 1

## 2024-03-11 NOTE — PROGRESS NOTES
Nida Anthony is a 73 y.o. female (: 1950) presenting to address:    Chief Complaint   Patient presents with    Establish Care     C/o hissing sound in bilat ears       Vitals:    24 1314   BP: 130/62   Pulse: 85   Resp: 15   Temp: 97.5 °F (36.4 °C)   SpO2: 97%       \"Have you been to the ER, urgent care clinic since your last visit?  Hospitalized since your last visit?\"    YES - When: approximately 3  weeks ago.  Where and Why: [facility] for ear ringing; [facility] for pneumonia 2023.    “Have you seen or consulted any other health care providers outside of [facility] since your last visit?”    NO

## 2024-03-12 LAB
ALBUMIN SERPL-MCNC: 4.6 G/DL (ref 3.8–4.8)
ALBUMIN/CREAT UR: 19 MG/G CREAT (ref 0–29)
ALBUMIN/GLOB SERPL: 1.8 {RATIO} (ref 1.2–2.2)
ALP SERPL-CCNC: 115 IU/L (ref 44–121)
ALT SERPL-CCNC: 9 IU/L (ref 0–32)
AST SERPL-CCNC: 15 IU/L (ref 0–40)
BASOPHILS # BLD AUTO: 0 X10E3/UL (ref 0–0.2)
BASOPHILS NFR BLD AUTO: 1 %
BILIRUB SERPL-MCNC: 0.2 MG/DL (ref 0–1.2)
BUN SERPL-MCNC: 27 MG/DL (ref 8–27)
BUN/CREAT SERPL: 28 (ref 12–28)
CALCIUM SERPL-MCNC: 9.7 MG/DL (ref 8.7–10.3)
CHLORIDE SERPL-SCNC: 106 MMOL/L (ref 96–106)
CHOLEST SERPL-MCNC: 154 MG/DL (ref 100–199)
CO2 SERPL-SCNC: 22 MMOL/L (ref 20–29)
CREAT SERPL-MCNC: 0.97 MG/DL (ref 0.57–1)
CREAT UR-MCNC: 125.9 MG/DL
EGFRCR SERPLBLD CKD-EPI 2021: 61 ML/MIN/1.73
EOSINOPHIL # BLD AUTO: 0.4 X10E3/UL (ref 0–0.4)
EOSINOPHIL NFR BLD AUTO: 5 %
ERYTHROCYTE [DISTWIDTH] IN BLOOD BY AUTOMATED COUNT: 12.4 % (ref 11.7–15.4)
GLOBULIN SER CALC-MCNC: 2.6 G/DL (ref 1.5–4.5)
GLUCOSE SERPL-MCNC: 110 MG/DL (ref 70–99)
HCT VFR BLD AUTO: 36.5 % (ref 34–46.6)
HDLC SERPL-MCNC: 92 MG/DL
HGB BLD-MCNC: 12.1 G/DL (ref 11.1–15.9)
IMM GRANULOCYTES # BLD AUTO: 0 X10E3/UL (ref 0–0.1)
IMM GRANULOCYTES NFR BLD AUTO: 0 %
LDLC SERPL CALC-MCNC: 44 MG/DL (ref 0–99)
LYMPHOCYTES # BLD AUTO: 1.6 X10E3/UL (ref 0.7–3.1)
LYMPHOCYTES NFR BLD AUTO: 20 %
MCH RBC QN AUTO: 29.6 PG (ref 26.6–33)
MCHC RBC AUTO-ENTMCNC: 33.2 G/DL (ref 31.5–35.7)
MCV RBC AUTO: 89 FL (ref 79–97)
MICROALBUMIN UR-MCNC: 24.1 UG/ML
MONOCYTES # BLD AUTO: 0.6 X10E3/UL (ref 0.1–0.9)
MONOCYTES NFR BLD AUTO: 8 %
NEUTROPHILS # BLD AUTO: 5.3 X10E3/UL (ref 1.4–7)
NEUTROPHILS NFR BLD AUTO: 66 %
PLATELET # BLD AUTO: 286 X10E3/UL (ref 150–450)
POTASSIUM SERPL-SCNC: 4.7 MMOL/L (ref 3.5–5.2)
PROT SERPL-MCNC: 7.2 G/DL (ref 6–8.5)
RBC # BLD AUTO: 4.09 X10E6/UL (ref 3.77–5.28)
SODIUM SERPL-SCNC: 143 MMOL/L (ref 134–144)
SPECIMEN STATUS REPORT: NORMAL
TRIGL SERPL-MCNC: 104 MG/DL (ref 0–149)
VLDLC SERPL CALC-MCNC: 18 MG/DL (ref 5–40)
WBC # BLD AUTO: 7.9 X10E3/UL (ref 3.4–10.8)

## 2024-03-18 ENCOUNTER — TELEPHONE (OUTPATIENT)
Dept: FAMILY MEDICINE CLINIC | Facility: CLINIC | Age: 74
End: 2024-03-18

## 2024-03-18 NOTE — TELEPHONE ENCOUNTER
Pt called stating that she was in the office on 4/11/24. Pt was prescribed ear drops neomycin-polymyxin-hydrocortisone (CORTISPORIN) [id number] otic solution. Pt stated that she was informed by PCP that if the symptoms fail to improve to call the office. Pt stated that she has finished the medication an there is no improvement. Please advise

## 2024-03-20 NOTE — TELEPHONE ENCOUNTER
Spoke with pt and pt daughter about the debrox ear way removal kit told her she can pick it up at any pharmacy they voiced their understanding.

## 2024-03-25 ENCOUNTER — TELEPHONE (OUTPATIENT)
Dept: FAMILY MEDICINE CLINIC | Facility: CLINIC | Age: 74
End: 2024-03-25

## 2024-03-25 DIAGNOSIS — H92.03 OTALGIA OF BOTH EARS: Primary | ICD-10-CM

## 2024-03-25 NOTE — TELEPHONE ENCOUNTER
Pt called and stated the prescribed medicine and OTC treatment Dr. Box have not worked for her and she would like a referral to see an ENT.

## 2024-06-11 ENCOUNTER — OFFICE VISIT (OUTPATIENT)
Dept: FAMILY MEDICINE CLINIC | Facility: CLINIC | Age: 74
End: 2024-06-11
Payer: MEDICARE

## 2024-06-11 VITALS
WEIGHT: 158.4 LBS | HEIGHT: 61 IN | RESPIRATION RATE: 15 BRPM | SYSTOLIC BLOOD PRESSURE: 110 MMHG | TEMPERATURE: 97.3 F | OXYGEN SATURATION: 98 % | DIASTOLIC BLOOD PRESSURE: 64 MMHG | BODY MASS INDEX: 29.91 KG/M2 | HEART RATE: 73 BPM

## 2024-06-11 DIAGNOSIS — E11.9 TYPE 2 DIABETES MELLITUS WITHOUT COMPLICATION, WITHOUT LONG-TERM CURRENT USE OF INSULIN (HCC): ICD-10-CM

## 2024-06-11 DIAGNOSIS — I10 ESSENTIAL HYPERTENSION, BENIGN: ICD-10-CM

## 2024-06-11 DIAGNOSIS — M06.09 RHEUMATOID ARTHRITIS OF MULTIPLE SITES WITH NEGATIVE RHEUMATOID FACTOR (HCC): ICD-10-CM

## 2024-06-11 DIAGNOSIS — Z00.00 MEDICARE ANNUAL WELLNESS VISIT, SUBSEQUENT: Primary | ICD-10-CM

## 2024-06-11 LAB — HBA1C MFR BLD: 6.7 %

## 2024-06-11 PROCEDURE — 3074F SYST BP LT 130 MM HG: CPT | Performed by: STUDENT IN AN ORGANIZED HEALTH CARE EDUCATION/TRAINING PROGRAM

## 2024-06-11 PROCEDURE — 99214 OFFICE O/P EST MOD 30 MIN: CPT | Performed by: STUDENT IN AN ORGANIZED HEALTH CARE EDUCATION/TRAINING PROGRAM

## 2024-06-11 PROCEDURE — 1123F ACP DISCUSS/DSCN MKR DOCD: CPT | Performed by: STUDENT IN AN ORGANIZED HEALTH CARE EDUCATION/TRAINING PROGRAM

## 2024-06-11 PROCEDURE — 83036 HEMOGLOBIN GLYCOSYLATED A1C: CPT | Performed by: STUDENT IN AN ORGANIZED HEALTH CARE EDUCATION/TRAINING PROGRAM

## 2024-06-11 PROCEDURE — 3078F DIAST BP <80 MM HG: CPT | Performed by: STUDENT IN AN ORGANIZED HEALTH CARE EDUCATION/TRAINING PROGRAM

## 2024-06-11 PROCEDURE — G0439 PPPS, SUBSEQ VISIT: HCPCS | Performed by: STUDENT IN AN ORGANIZED HEALTH CARE EDUCATION/TRAINING PROGRAM

## 2024-06-11 ASSESSMENT — ENCOUNTER SYMPTOMS
EYE PAIN: 0
DIARRHEA: 1
COUGH: 0
RHINORRHEA: 0
CONSTIPATION: 0
SORE THROAT: 0
NAUSEA: 0
SHORTNESS OF BREATH: 0
CHEST TIGHTNESS: 0
ABDOMINAL PAIN: 0
VOMITING: 0
BACK PAIN: 0

## 2024-06-11 ASSESSMENT — LIFESTYLE VARIABLES
HOW MANY STANDARD DRINKS CONTAINING ALCOHOL DO YOU HAVE ON A TYPICAL DAY: PATIENT DOES NOT DRINK
HOW OFTEN DO YOU HAVE A DRINK CONTAINING ALCOHOL: NEVER

## 2024-06-11 ASSESSMENT — PATIENT HEALTH QUESTIONNAIRE - PHQ9
SUM OF ALL RESPONSES TO PHQ9 QUESTIONS 1 & 2: 0
SUM OF ALL RESPONSES TO PHQ QUESTIONS 1-9: 0
SUM OF ALL RESPONSES TO PHQ QUESTIONS 1-9: 0
2. FEELING DOWN, DEPRESSED OR HOPELESS: NOT AT ALL
SUM OF ALL RESPONSES TO PHQ QUESTIONS 1-9: 0
1. LITTLE INTEREST OR PLEASURE IN DOING THINGS: NOT AT ALL
SUM OF ALL RESPONSES TO PHQ QUESTIONS 1-9: 0

## 2024-06-11 NOTE — PROGRESS NOTES
[facility]      MR#: [id number]    HISTORY OF PRESENT ILLNESS  History of Present Illness  The patient is a 73-year-old female who presents for diabetes and wellness visit today.    The patient reports experiencing bilateral thumb pain, which was previously diagnosed as a nerve issue by her rheumatologist. She was prescribed prednisone and advised to undergo surgery if the condition does not improve. Approximately 2 to 3 months ago, she received a Kenalog injection, which did not yield any improvement. She utilizes a finger brace, which she finds beneficial.    Approximately 2 weeks ago, the patient experienced a fall. She reports occasional imbalance and occasional instability when standing for extended periods. She denies experiencing lightheadedness or near syncope. She typically engages in home-based therapy, having previously undergone walking therapy during a hospital visit.    The patient's blood pressure readings at home are generally satisfactory, with occasional spikes to 124.    The patient has been on metformin for over 20 years, 1000 mg twice daily. However, she has been experiencing frequent diarrhea for the past 6 to 7 months, which she attributes to metformin. Despite attempts to avoid Imodium, she often resorts to taking 3 additional doses. The diarrhea is random, sometimes accompanied by fear about going out. She denies experiencing fevers, chills, or recent travel. Her stools are loose, and she denies any hematochezia. She has discontinued Actos.    Supplemental Information  She had carpal tunnel surgery a couple of years ago. She had a colonoscopy in 2017.    Rheumatologist: Dr. Fraire   Pulmonologist: Dr. Bethea     Past medical history:  Type 2 diabetes  Hypertension  Moderate persistent asthma, history of intubation;hospitalization  Rheumatoid arthritis  Chronic insomnia  Lower back pain  Bilateral hearing loss  Bilateral hip replacement     Social:  Tobacco use:

## 2024-06-11 NOTE — PROGRESS NOTES
Medicare Annual Wellness Visit    Nida Anthony is here for Medicare AWV    Assessment & Plan   Type 2 diabetes mellitus without complication, without long-term current use of insulin (HCC)  -     AMB POC HEMOGLOBIN A1C  -     empagliflozin (JARDIANCE) 10 MG tablet; Take 1 tablet by mouth daily, Disp-90 tablet, R-3Normal  Medicare annual wellness visit, subsequent    Recommendations for Preventive Services Due: see orders and patient instructions/AVS.  Recommended screening schedule for the next 5-10 years is provided to the patient in written form: see Patient Instructions/AVS.     Return in 3 months (on 9/11/2024) for Diabetes follow up.     Subjective     Patient's complete Health Risk Assessment and screening values have been reviewed and are found in Flowsheets. The following problems were reviewed today and where indicated follow up appointments were made and/or referrals ordered.    Positive Risk Factor Screenings with Interventions:    Fall Risk:  Do you feel unsteady or are you worried about falling? : (!) yes  2 or more falls in past year?: (!) yes  Fall with injury in past year?: (!) yes (abrasion on left knee; bruised left side tailbone)     Interventions:    Reviewed medications, home hazards, visual acuity, and co-morbidities that can increase risk for falls             Activity, Diet, and Weight:  On average, how many days per week do you engage in moderate to strenuous exercise (like a brisk walk)?: 0 days  On average, how many minutes do you engage in exercise at this level?: 0 min    Do you eat balanced/healthy meals regularly?: Yes    Body mass index is 29.93 kg/m².      Inactivity Interventions:  See AVS for additional education material         Hearing Screen:  Do you or your family notice any trouble with your hearing that hasn't been managed with hearing aids?: (!) Yes (tinnitus)    Interventions:  See AVS for additional education material       Advanced Directives:  Do you have a Living

## 2024-06-11 NOTE — PROGRESS NOTES
Nida Anthony is a 73 y.o. female (: 1950) presenting to address:    Chief Complaint   Patient presents with    Medicare AWV       Vitals:    24 1255   BP: 110/64   Pulse: 73   Resp: 15   Temp: 97.3 °F (36.3 °C)   SpO2: 98%       \"Have you been to the ER, urgent care clinic since your last visit?  Hospitalized since your last visit?\"    YES - When: approximately 3 months ago.  Where and Why: urgent care for ringing in ear.    “Have you seen or consulted any other health care providers outside of [facility] since your last visit?”    YES - When: approximately 6 days ago.  Where and Why: rheumatology.

## 2024-06-27 ENCOUNTER — TELEPHONE (OUTPATIENT)
Dept: FAMILY MEDICINE CLINIC | Facility: CLINIC | Age: 74
End: 2024-06-27

## 2024-06-27 DIAGNOSIS — E11.9 TYPE 2 DIABETES MELLITUS WITHOUT COMPLICATION, WITHOUT LONG-TERM CURRENT USE OF INSULIN (HCC): Primary | ICD-10-CM

## 2024-06-27 NOTE — TELEPHONE ENCOUNTER
Pt called in stating she has been taken off of Metformin and was put on Jardiance. Pt states her sugar levels have been high since being on this medication. Explained I will forward this msg to Dr. Box's team and she will receive a f/u call. Phone # [phone]

## 2024-06-27 NOTE — TELEPHONE ENCOUNTER
Pt called in stating she has been taken off of Metformin and was put on Jardiance. Pt states her sugar levels have been high since being on this medication. Explained I will forward this msg to Dr. Box's team and she will receive a f/u call. Phone # [phone]

## 2024-06-27 NOTE — PROGRESS NOTES
Patient states that she has had consistent glucose over 200s since stopping her metformin and starting Jardiance.  Recommend she increase to taking Jardiance 2 pills at once and will increase her to Jardiance 25 mg.  She was having diarrhea with metformin previously

## 2024-09-04 ENCOUNTER — TELEPHONE (OUTPATIENT)
Dept: FAMILY MEDICINE CLINIC | Facility: CLINIC | Age: 74
End: 2024-09-04

## 2024-09-04 NOTE — TELEPHONE ENCOUNTER
Pt calling about her medication: empagliflozin (Jardiance) 25 mg tablet.  She said it is costing her $150 for a 30 day supply, and is requesting to change it to a different medication if possible.

## 2024-09-04 NOTE — TELEPHONE ENCOUNTER
Informed pt, this medication and any changes will be discussed at next visit:   Future Appointments   Date Time Provider Department Center   9/11/2024  1:00 PM Damian Box,  [facility]

## 2024-09-11 ENCOUNTER — OFFICE VISIT (OUTPATIENT)
Dept: FAMILY MEDICINE CLINIC | Facility: CLINIC | Age: 74
End: 2024-09-11
Payer: MEDICARE

## 2024-09-11 VITALS
OXYGEN SATURATION: 91 % | DIASTOLIC BLOOD PRESSURE: 64 MMHG | TEMPERATURE: 97.6 F | HEIGHT: 61 IN | SYSTOLIC BLOOD PRESSURE: 110 MMHG | RESPIRATION RATE: 13 BRPM | HEART RATE: 75 BPM | WEIGHT: 164.8 LBS | BODY MASS INDEX: 31.11 KG/M2

## 2024-09-11 DIAGNOSIS — R42 VERTIGO: ICD-10-CM

## 2024-09-11 DIAGNOSIS — E11.9 TYPE 2 DIABETES MELLITUS WITHOUT COMPLICATION, WITHOUT LONG-TERM CURRENT USE OF INSULIN (HCC): Primary | ICD-10-CM

## 2024-09-11 DIAGNOSIS — M79.89 SWELLING OF BOTH LOWER EXTREMITIES: ICD-10-CM

## 2024-09-11 LAB — HBA1C MFR BLD: 7.2 %

## 2024-09-11 PROCEDURE — 1123F ACP DISCUSS/DSCN MKR DOCD: CPT | Performed by: STUDENT IN AN ORGANIZED HEALTH CARE EDUCATION/TRAINING PROGRAM

## 2024-09-11 PROCEDURE — 3078F DIAST BP <80 MM HG: CPT | Performed by: STUDENT IN AN ORGANIZED HEALTH CARE EDUCATION/TRAINING PROGRAM

## 2024-09-11 PROCEDURE — 83036 HEMOGLOBIN GLYCOSYLATED A1C: CPT | Performed by: STUDENT IN AN ORGANIZED HEALTH CARE EDUCATION/TRAINING PROGRAM

## 2024-09-11 PROCEDURE — 99214 OFFICE O/P EST MOD 30 MIN: CPT | Performed by: STUDENT IN AN ORGANIZED HEALTH CARE EDUCATION/TRAINING PROGRAM

## 2024-09-11 PROCEDURE — 3074F SYST BP LT 130 MM HG: CPT | Performed by: STUDENT IN AN ORGANIZED HEALTH CARE EDUCATION/TRAINING PROGRAM

## 2024-09-11 RX ORDER — FUROSEMIDE 20 MG
20 TABLET ORAL DAILY PRN
Qty: 30 TABLET | Refills: 2 | Status: SHIPPED | OUTPATIENT
Start: 2024-09-11

## 2024-09-11 RX ORDER — MECLIZINE HYDROCHLORIDE 25 MG/1
25 TABLET ORAL 3 TIMES DAILY PRN
Qty: 30 TABLET | Refills: 1 | Status: SHIPPED | OUTPATIENT
Start: 2024-09-11 | End: 2024-10-01

## 2024-09-11 ASSESSMENT — ENCOUNTER SYMPTOMS
CHEST TIGHTNESS: 0
NAUSEA: 0
BACK PAIN: 0
DIARRHEA: 0
EYE PAIN: 0
COUGH: 0
ABDOMINAL PAIN: 0
SHORTNESS OF BREATH: 0
CONSTIPATION: 0
VOMITING: 0
RHINORRHEA: 0
SORE THROAT: 0

## 2024-11-26 DIAGNOSIS — I10 ESSENTIAL HYPERTENSION, BENIGN: ICD-10-CM

## 2024-11-26 RX ORDER — CLONIDINE HYDROCHLORIDE 0.1 MG/1
TABLET ORAL
Qty: 270 TABLET | Refills: 1 | Status: SHIPPED | OUTPATIENT
Start: 2024-11-26

## 2024-11-26 NOTE — TELEPHONE ENCOUNTER
Requested Prescriptions     Pending Prescriptions Disp Refills    cloNIDine (CATAPRES) 0.1 MG tablet 270 tablet 1     Si in AM, 2 in PM     Last seen: 24  Next seen: 24    Last filled: 3/11/24 Qty 270 w/1 refill

## 2024-12-06 NOTE — PATIENT INSTRUCTIONS
Body Mass Index: Care Instructions  Your Care Instructions    Body mass index (BMI) can help you see if your weight is raising your risk for health problems. It uses a formula to compare how much you weigh with how tall you are. · A BMI lower than 18.5 is considered underweight. · A BMI between 18.5 and 24.9 is considered healthy. · A BMI between 25 and 29.9 is considered overweight. A BMI of 30 or higher is considered obese. If your BMI is in the normal range, it means that you have a lower risk for weight-related health problems. If your BMI is in the overweight or obese range, you may be at increased risk for weight-related health problems, such as high blood pressure, heart disease, stroke, arthritis or joint pain, and diabetes. If your BMI is in the underweight range, you may be at increased risk for health problems such as fatigue, lower protection (immunity) against illness, muscle loss, bone loss, hair loss, and hormone problems. BMI is just one measure of your risk for weight-related health problems. You may be at higher risk for health problems if you are not active, you eat an unhealthy diet, or you drink too much alcohol or use tobacco products. Follow-up care is a key part of your treatment and safety. Be sure to make and go to all appointments, and call your doctor if you are having problems. It's also a good idea to know your test results and keep a list of the medicines you take. How can you care for yourself at home? · Practice healthy eating habits. This includes eating plenty of fruits, vegetables, whole grains, lean protein, and low-fat dairy. · If your doctor recommends it, get more exercise. Walking is a good choice. Bit by bit, increase the amount you walk every day. Try for at least 30 minutes on most days of the week. · Do not smoke. Smoking can increase your risk for health problems. If you need help quitting, talk to your doctor about stop-smoking programs and medicines. These can increase your chances of quitting for good. · Limit alcohol to 2 drinks a day for men and 1 drink a day for women. Too much alcohol can cause health problems. If you have a BMI higher than 25  · Your doctor may do other tests to check your risk for weight-related health problems. This may include measuring the distance around your waist. A waist measurement of more than 40 inches in men or 35 inches in women can increase the risk of weight-related health problems. · Talk with your doctor about steps you can take to stay healthy or improve your health. You may need to make lifestyle changes to lose weight and stay healthy, such as changing your diet and getting regular exercise. If you have a BMI lower than 18.5  · Your doctor may do other tests to check your risk for health problems. · Talk with your doctor about steps you can take to stay healthy or improve your health. You may need to make lifestyle changes to gain or maintain weight and stay healthy, such as getting more healthy foods in your diet and doing exercises to build muscle. Where can you learn more? Go to http://tom-suhail.info/. Enter S176 in the search box to learn more about \"Body Mass Index: Care Instructions. \"  Current as of: October 13, 2016  Content Version: 11.4  © 0070-2564 [employer]. Care instructions adapted under license by [employer] (which disclaims liability or warranty for this information). If you have questions about a medical condition or this instruction, always ask your healthcare professional. [address] any warranty or liability for your use of this information. Body Mass Index: Care Instructions  Your Care Instructions    Body mass index (BMI) can help you see if your weight is raising your risk for health problems. It uses a formula to compare how much you weigh with how tall you are.   · A BMI lower than 18.5 is considered underweight. · A BMI between 18.5 and 24.9 is considered healthy. · A BMI between 25 and 29.9 is considered overweight. A BMI of 30 or higher is considered obese. If your BMI is in the normal range, it means that you have a lower risk for weight-related health problems. If your BMI is in the overweight or obese range, you may be at increased risk for weight-related health problems, such as high blood pressure, heart disease, stroke, arthritis or joint pain, and diabetes. If your BMI is in the underweight range, you may be at increased risk for health problems such as fatigue, lower protection (immunity) against illness, muscle loss, bone loss, hair loss, and hormone problems. BMI is just one measure of your risk for weight-related health problems. You may be at higher risk for health problems if you are not active, you eat an unhealthy diet, or you drink too much alcohol or use tobacco products. Follow-up care is a key part of your treatment and safety. Be sure to make and go to all appointments, and call your doctor if you are having problems. It's also a good idea to know your test results and keep a list of the medicines you take. How can you care for yourself at home? · Practice healthy eating habits. This includes eating plenty of fruits, vegetables, whole grains, lean protein, and low-fat dairy. · If your doctor recommends it, get more exercise. Walking is a good choice. Bit by bit, increase the amount you walk every day. Try for at least 30 minutes on most days of the week. · Do not smoke. Smoking can increase your risk for health problems. If you need help quitting, talk to your doctor about stop-smoking programs and medicines. These can increase your chances of quitting for good. · Limit alcohol to 2 drinks a day for men and 1 drink a day for women. Too much alcohol can cause health problems.   If you have a BMI higher than 25  · Your doctor may do other tests to check your risk for weight-related health problems. This may include measuring the distance around your waist. A waist measurement of more than 40 inches in men or 35 inches in women can increase the risk of weight-related health problems. · Talk with your doctor about steps you can take to stay healthy or improve your health. You may need to make lifestyle changes to lose weight and stay healthy, such as changing your diet and getting regular exercise. If you have a BMI lower than 18.5  · Your doctor may do other tests to check your risk for health problems. · Talk with your doctor about steps you can take to stay healthy or improve your health. You may need to make lifestyle changes to gain or maintain weight and stay healthy, such as getting more healthy foods in your diet and doing exercises to build muscle. Where can you learn more? Go to http://tom-suhail.info/. Enter S176 in the search box to learn more about \"Body Mass Index: Care Instructions. \"  Current as of: October 13, 2016  Content Version: 11.4  © 1283-6325 [employer]. Care instructions adapted under license by [employer] (which disclaims liability or warranty for this information). If you have questions about a medical condition or this instruction, always ask your healthcare professional. [address] any warranty or liability for your use of this information. No

## 2024-12-18 ENCOUNTER — OFFICE VISIT (OUTPATIENT)
Dept: FAMILY MEDICINE CLINIC | Facility: CLINIC | Age: 74
End: 2024-12-18
Payer: MEDICARE

## 2024-12-18 VITALS
WEIGHT: 160.8 LBS | OXYGEN SATURATION: 98 % | TEMPERATURE: 97.5 F | HEART RATE: 80 BPM | HEIGHT: 61 IN | DIASTOLIC BLOOD PRESSURE: 60 MMHG | RESPIRATION RATE: 14 BRPM | BODY MASS INDEX: 30.36 KG/M2 | SYSTOLIC BLOOD PRESSURE: 108 MMHG

## 2024-12-18 DIAGNOSIS — I10 ESSENTIAL HYPERTENSION, BENIGN: ICD-10-CM

## 2024-12-18 DIAGNOSIS — E11.9 TYPE 2 DIABETES MELLITUS WITHOUT COMPLICATION, WITHOUT LONG-TERM CURRENT USE OF INSULIN (HCC): Primary | ICD-10-CM

## 2024-12-18 LAB — HBA1C MFR BLD: 7.6 %

## 2024-12-18 PROCEDURE — 1036F TOBACCO NON-USER: CPT | Performed by: STUDENT IN AN ORGANIZED HEALTH CARE EDUCATION/TRAINING PROGRAM

## 2024-12-18 PROCEDURE — 1126F AMNT PAIN NOTED NONE PRSNT: CPT | Performed by: STUDENT IN AN ORGANIZED HEALTH CARE EDUCATION/TRAINING PROGRAM

## 2024-12-18 PROCEDURE — 83036 HEMOGLOBIN GLYCOSYLATED A1C: CPT | Performed by: STUDENT IN AN ORGANIZED HEALTH CARE EDUCATION/TRAINING PROGRAM

## 2024-12-18 PROCEDURE — G8400 PT W/DXA NO RESULTS DOC: HCPCS | Performed by: STUDENT IN AN ORGANIZED HEALTH CARE EDUCATION/TRAINING PROGRAM

## 2024-12-18 PROCEDURE — 3017F COLORECTAL CA SCREEN DOC REV: CPT | Performed by: STUDENT IN AN ORGANIZED HEALTH CARE EDUCATION/TRAINING PROGRAM

## 2024-12-18 PROCEDURE — 2022F DILAT RTA XM EVC RTNOPTHY: CPT | Performed by: STUDENT IN AN ORGANIZED HEALTH CARE EDUCATION/TRAINING PROGRAM

## 2024-12-18 PROCEDURE — G8427 DOCREV CUR MEDS BY ELIG CLIN: HCPCS | Performed by: STUDENT IN AN ORGANIZED HEALTH CARE EDUCATION/TRAINING PROGRAM

## 2024-12-18 PROCEDURE — G8417 CALC BMI ABV UP PARAM F/U: HCPCS | Performed by: STUDENT IN AN ORGANIZED HEALTH CARE EDUCATION/TRAINING PROGRAM

## 2024-12-18 PROCEDURE — 1090F PRES/ABSN URINE INCON ASSESS: CPT | Performed by: STUDENT IN AN ORGANIZED HEALTH CARE EDUCATION/TRAINING PROGRAM

## 2024-12-18 PROCEDURE — G8484 FLU IMMUNIZE NO ADMIN: HCPCS | Performed by: STUDENT IN AN ORGANIZED HEALTH CARE EDUCATION/TRAINING PROGRAM

## 2024-12-18 PROCEDURE — 99214 OFFICE O/P EST MOD 30 MIN: CPT | Performed by: STUDENT IN AN ORGANIZED HEALTH CARE EDUCATION/TRAINING PROGRAM

## 2024-12-18 PROCEDURE — 3046F HEMOGLOBIN A1C LEVEL >9.0%: CPT | Performed by: STUDENT IN AN ORGANIZED HEALTH CARE EDUCATION/TRAINING PROGRAM

## 2024-12-18 PROCEDURE — 3074F SYST BP LT 130 MM HG: CPT | Performed by: STUDENT IN AN ORGANIZED HEALTH CARE EDUCATION/TRAINING PROGRAM

## 2024-12-18 PROCEDURE — 3078F DIAST BP <80 MM HG: CPT | Performed by: STUDENT IN AN ORGANIZED HEALTH CARE EDUCATION/TRAINING PROGRAM

## 2024-12-18 PROCEDURE — 1123F ACP DISCUSS/DSCN MKR DOCD: CPT | Performed by: STUDENT IN AN ORGANIZED HEALTH CARE EDUCATION/TRAINING PROGRAM

## 2024-12-18 PROCEDURE — 1159F MED LIST DOCD IN RCRD: CPT | Performed by: STUDENT IN AN ORGANIZED HEALTH CARE EDUCATION/TRAINING PROGRAM

## 2024-12-18 ASSESSMENT — ENCOUNTER SYMPTOMS
CHEST TIGHTNESS: 0
RHINORRHEA: 0
CONSTIPATION: 0
SORE THROAT: 0
VOMITING: 0
DIARRHEA: 1
COUGH: 0
EYE PAIN: 0
SHORTNESS OF BREATH: 0
ABDOMINAL PAIN: 0
BACK PAIN: 0
NAUSEA: 0

## 2024-12-18 NOTE — PROGRESS NOTES
[facility]      MR#: [id number]    HISTORY OF PRESENT ILLNESS  History of Present Illness  The patient is a 74-year-old female who presents for a diabetes follow-up.    She reports an increase in her A1c levels, which were previously recorded at 7.2. She expresses interest in exploring Ozempic as a potential treatment option, citing her daughter's positive experience with the medication. She also mentions a lack of appetite, typically consuming only breakfast and dinner. Her current medication regimen includes metformin, taken with food, which she reports has been causing severe diarrhea. Jardiance was discontinued due to its ineffectiveness in controlling her blood sugar levels. She has not been on Actos for a significant period, attributing it to her leg strength.    She recently underwent colon cancer screening and was advised to repeat the procedure in 5 years.    She experiences shortness of breath when walking long distances. She is currently using Trelegy once a day, which she finds effective and occasionally negates the need for additional inhaler use.    She reports no changes in her swelling, which becomes bothersome only after prolonged standing.    Supplemental Information  She has arthritis.    MEDICATIONS  Current: Metformin, Trelegy  Discontinued: Jardiance, Actos    IMMUNIZATIONS  She has received the influenza vaccine.    Rheumatologist: Dr. Fraire   Pulmonologist: Dr. Bethea     Past medical history:  Type 2 diabetes  Hypertension  Moderate persistent asthma, history of intubation;hospitalization  Rheumatoid arthritis  Chronic insomnia  Lower back pain  Bilateral hearing loss  Bilateral hip replacement     Social:  Tobacco use: Former  Alcohol use: Denies  Illicit drug use: Denies  Housing: Lives in [place] with    Employment: Retired      Health maintenance:  Colon cancer screening: Due in 2027  Breast cancer screening: Due in 10/2025  DEXA: Complete   COVID:

## 2024-12-18 NOTE — PROGRESS NOTES
Nida Anthony is a 74 y.o. female (: 1950) presenting to address:    Chief Complaint   Patient presents with    Diabetes       Vitals:    24 1307   BP: 108/60   Pulse: 80   Resp: 14   Temp: 97.5 °F (36.4 °C)   SpO2: 98%       \"Have you been to the ER, urgent care clinic since your last visit?  Hospitalized since your last visit?\"    NO    “Have you seen or consulted any other health care providers outside of [facility] since your last visit?”    NO    “Have you had a colorectal cancer screening such as a colonoscopy/FIT/Cologuard?    YES - Type: Colonoscopy - Where: ; repeat in  Nurse/CMA to request most recent records if not in the chart     Date of last Colonoscopy: 10/21/2014  No cologuard on file  No FIT/FOBT on file   No flexible sigmoidoscopy on file

## 2025-01-13 DIAGNOSIS — M79.89 SWELLING OF BOTH LOWER EXTREMITIES: ICD-10-CM

## 2025-01-13 RX ORDER — FUROSEMIDE 20 MG/1
20 TABLET ORAL DAILY PRN
Qty: 30 TABLET | Refills: 2 | Status: SHIPPED | OUTPATIENT
Start: 2025-01-13

## 2025-02-13 ENCOUNTER — OFFICE VISIT (OUTPATIENT)
Dept: FAMILY MEDICINE CLINIC | Facility: CLINIC | Age: 75
End: 2025-02-13
Payer: MEDICARE

## 2025-02-13 VITALS
DIASTOLIC BLOOD PRESSURE: 62 MMHG | HEART RATE: 78 BPM | WEIGHT: 161.4 LBS | BODY MASS INDEX: 30.47 KG/M2 | TEMPERATURE: 96.7 F | HEIGHT: 61 IN | SYSTOLIC BLOOD PRESSURE: 118 MMHG | RESPIRATION RATE: 12 BRPM | OXYGEN SATURATION: 95 %

## 2025-02-13 DIAGNOSIS — E11.9 TYPE 2 DIABETES MELLITUS WITHOUT COMPLICATION, WITHOUT LONG-TERM CURRENT USE OF INSULIN (HCC): ICD-10-CM

## 2025-02-13 DIAGNOSIS — N30.00 ACUTE CYSTITIS WITHOUT HEMATURIA: Primary | ICD-10-CM

## 2025-02-13 LAB
BILIRUBIN, URINE, POC: NEGATIVE
BLOOD URINE, POC: NEGATIVE
GLUCOSE URINE, POC: NEGATIVE
KETONES, URINE, POC: NEGATIVE
LEUKOCYTE ESTERASE, URINE, POC: ABNORMAL
NITRITE, URINE, POC: NEGATIVE
PH, URINE, POC: 6 (ref 4.6–8)
PROTEIN,URINE, POC: ABNORMAL
SPECIFIC GRAVITY, URINE, POC: 1.02 (ref 1–1.03)
URINALYSIS CLARITY, POC: CLEAR
URINALYSIS COLOR, POC: YELLOW
UROBILINOGEN, POC: ABNORMAL

## 2025-02-13 PROCEDURE — 1036F TOBACCO NON-USER: CPT | Performed by: STUDENT IN AN ORGANIZED HEALTH CARE EDUCATION/TRAINING PROGRAM

## 2025-02-13 PROCEDURE — G8427 DOCREV CUR MEDS BY ELIG CLIN: HCPCS | Performed by: STUDENT IN AN ORGANIZED HEALTH CARE EDUCATION/TRAINING PROGRAM

## 2025-02-13 PROCEDURE — 99213 OFFICE O/P EST LOW 20 MIN: CPT | Performed by: STUDENT IN AN ORGANIZED HEALTH CARE EDUCATION/TRAINING PROGRAM

## 2025-02-13 PROCEDURE — 3078F DIAST BP <80 MM HG: CPT | Performed by: STUDENT IN AN ORGANIZED HEALTH CARE EDUCATION/TRAINING PROGRAM

## 2025-02-13 PROCEDURE — 1125F AMNT PAIN NOTED PAIN PRSNT: CPT | Performed by: STUDENT IN AN ORGANIZED HEALTH CARE EDUCATION/TRAINING PROGRAM

## 2025-02-13 PROCEDURE — 81003 URINALYSIS AUTO W/O SCOPE: CPT | Performed by: STUDENT IN AN ORGANIZED HEALTH CARE EDUCATION/TRAINING PROGRAM

## 2025-02-13 PROCEDURE — 2022F DILAT RTA XM EVC RTNOPTHY: CPT | Performed by: STUDENT IN AN ORGANIZED HEALTH CARE EDUCATION/TRAINING PROGRAM

## 2025-02-13 PROCEDURE — G8417 CALC BMI ABV UP PARAM F/U: HCPCS | Performed by: STUDENT IN AN ORGANIZED HEALTH CARE EDUCATION/TRAINING PROGRAM

## 2025-02-13 PROCEDURE — 1159F MED LIST DOCD IN RCRD: CPT | Performed by: STUDENT IN AN ORGANIZED HEALTH CARE EDUCATION/TRAINING PROGRAM

## 2025-02-13 PROCEDURE — G8400 PT W/DXA NO RESULTS DOC: HCPCS | Performed by: STUDENT IN AN ORGANIZED HEALTH CARE EDUCATION/TRAINING PROGRAM

## 2025-02-13 PROCEDURE — 3046F HEMOGLOBIN A1C LEVEL >9.0%: CPT | Performed by: STUDENT IN AN ORGANIZED HEALTH CARE EDUCATION/TRAINING PROGRAM

## 2025-02-13 PROCEDURE — 1123F ACP DISCUSS/DSCN MKR DOCD: CPT | Performed by: STUDENT IN AN ORGANIZED HEALTH CARE EDUCATION/TRAINING PROGRAM

## 2025-02-13 PROCEDURE — 3017F COLORECTAL CA SCREEN DOC REV: CPT | Performed by: STUDENT IN AN ORGANIZED HEALTH CARE EDUCATION/TRAINING PROGRAM

## 2025-02-13 PROCEDURE — 3074F SYST BP LT 130 MM HG: CPT | Performed by: STUDENT IN AN ORGANIZED HEALTH CARE EDUCATION/TRAINING PROGRAM

## 2025-02-13 PROCEDURE — 1090F PRES/ABSN URINE INCON ASSESS: CPT | Performed by: STUDENT IN AN ORGANIZED HEALTH CARE EDUCATION/TRAINING PROGRAM

## 2025-02-13 RX ORDER — PRAVASTATIN SODIUM 20 MG
20 TABLET ORAL DAILY
Qty: 90 TABLET | Refills: 1 | Status: SHIPPED | OUTPATIENT
Start: 2025-02-13

## 2025-02-13 RX ORDER — DULOXETIN HYDROCHLORIDE 30 MG/1
30 CAPSULE, DELAYED RELEASE ORAL DAILY
COMMUNITY
Start: 2025-01-07

## 2025-02-13 RX ORDER — SULFAMETHOXAZOLE AND TRIMETHOPRIM 800; 160 MG/1; MG/1
1 TABLET ORAL 2 TIMES DAILY
Qty: 6 TABLET | Refills: 0 | Status: SHIPPED | OUTPATIENT
Start: 2025-02-13 | End: 2025-02-16

## 2025-02-13 SDOH — ECONOMIC STABILITY: FOOD INSECURITY: WITHIN THE PAST 12 MONTHS, THE FOOD YOU BOUGHT JUST DIDN'T LAST AND YOU DIDN'T HAVE MONEY TO GET MORE.: NEVER TRUE

## 2025-02-13 SDOH — ECONOMIC STABILITY: FOOD INSECURITY: WITHIN THE PAST 12 MONTHS, YOU WORRIED THAT YOUR FOOD WOULD RUN OUT BEFORE YOU GOT MONEY TO BUY MORE.: NEVER TRUE

## 2025-02-13 ASSESSMENT — ENCOUNTER SYMPTOMS
CONSTIPATION: 0
SORE THROAT: 0
CHEST TIGHTNESS: 0
RHINORRHEA: 0
BLOOD IN STOOL: 0
DIARRHEA: 0
SHORTNESS OF BREATH: 0
BACK PAIN: 0
ABDOMINAL DISTENTION: 1
VOMITING: 0
NAUSEA: 0
ABDOMINAL PAIN: 0
EYE PAIN: 0
COUGH: 0
ANAL BLEEDING: 0

## 2025-02-13 ASSESSMENT — PATIENT HEALTH QUESTIONNAIRE - PHQ9
1. LITTLE INTEREST OR PLEASURE IN DOING THINGS: NOT AT ALL
SUM OF ALL RESPONSES TO PHQ QUESTIONS 1-9: 0
SUM OF ALL RESPONSES TO PHQ9 QUESTIONS 1 & 2: 0
SUM OF ALL RESPONSES TO PHQ QUESTIONS 1-9: 0
2. FEELING DOWN, DEPRESSED OR HOPELESS: NOT AT ALL
SUM OF ALL RESPONSES TO PHQ QUESTIONS 1-9: 0
SUM OF ALL RESPONSES TO PHQ QUESTIONS 1-9: 0

## 2025-02-13 NOTE — PROGRESS NOTES
Nida Anthony is a 74 y.o. female (: 1950) presenting to address:    Chief Complaint   Patient presents with    Abdominal Pain     Started 2025 with lower abdominal pain       There were no vitals filed for this visit.    \"Have you been to the ER, urgent care clinic since your last visit?  Hospitalized since your last visit?\"    NO    “Have you seen or consulted any other health care providers outside of [facility] since your last visit?”    YES - When: approximately 2 months ago.  Where and Why: [facility]th[facility] - Dr. Fraire.    “Have you had a colorectal cancer screening such as a colonoscopy/FIT/Cologuard?    YES - Type: Colonoscopy - Where: 2024 at [facility]  Nurse/MAY to request most recent records if not in the chart     Date of last Colonoscopy: 10/21/2014  No cologuard on file  No FIT/FOBT on file   No flexible sigmoidoscopy on file              
Medications:     DULoxetine (CYMBALTA) 30 MG extended release capsule, Take 1 capsule by mouth daily, Disp: , Rfl:     sulfamethoxazole-trimethoprim (BACTRIM DS;SEPTRA DS) 800-160 MG per tablet, Take 1 tablet by mouth 2 times daily for 3 days, Disp: 6 tablet, Rfl: 0    pravastatin (PRAVACHOL) 20 MG tablet, Take 1 tablet by mouth daily, Disp: 90 tablet, Rfl: 1    Semaglutide,0.25 or 0.5MG/DOS, 2 MG/3ML SOPN, Inject 0.5 mg into the skin once a week, Disp: 3 mL, Rfl: 2    furosemide (LASIX) 20 MG tablet, Take 1 tablet by mouth daily as needed (swelling in legs as discussed), Disp: 30 tablet, Rfl: 2    cloNIDine (CATAPRES) 0.1 MG tablet, 1 in AM, 2 in PM, Disp: 270 tablet, Rfl: 1    metFORMIN (GLUCOPHAGE) 1000 MG tablet, Take 1 tablet by mouth 2 times daily (with meals), Disp: 180 tablet, Rfl: 1    TRELEGY ELLIPTA 100-62.5-25 MCG/ACT AEPB inhaler, Inhale 1 puff into the lungs daily, Disp: , Rfl:     vitamin C (ASCORBIC ACID) 500 MG tablet, Take 1 tablet by mouth daily, Disp: , Rfl:     cetirizine (ZYRTEC) 10 MG tablet, Take 1 tablet by mouth daily, Disp: , Rfl:     baclofen (LIORESAL) 10 MG tablet, Take 1 tablet by mouth 3 times daily, Disp: , Rfl:     amLODIPine (NORVASC) 10 MG tablet, Take 1 tablet by mouth daily, Disp: 90 tablet, Rfl: 1    hydroCHLOROthiazide (HYDRODIURIL) 25 MG tablet, Take 1 tablet by mouth daily, Disp: 90 tablet, Rfl: 1    losartan (COZAAR) 100 MG tablet, Take 1 tablet by mouth daily, Disp: 90 tablet, Rfl: 1    metoprolol succinate (TOPROL XL) 25 MG extended release tablet, Take 1 tablet by mouth daily, Disp: 90 tablet, Rfl: 1    traZODone (DESYREL) 150 MG tablet, Take 1 tablet by mouth nightly, Disp: 90 tablet, Rfl: 1    albuterol sulfate HFA (PROVENTIL;VENTOLIN;PROAIR) 108 (90 Base) MCG/ACT inhaler, Inhale 2 puffs into the lungs every 6 hours as needed, Disp: , Rfl:     Calcium Carbonate-Vitamin D 600-3.125 MG-MCG TABS, Take by mouth, Disp: , Rfl:     hydroxychloroquine (PLAQUENIL) 200 MG

## 2025-03-18 ENCOUNTER — OFFICE VISIT (OUTPATIENT)
Dept: FAMILY MEDICINE CLINIC | Facility: CLINIC | Age: 75
End: 2025-03-18
Payer: MEDICARE

## 2025-03-18 VITALS
OXYGEN SATURATION: 98 % | HEART RATE: 98 BPM | TEMPERATURE: 97.8 F | DIASTOLIC BLOOD PRESSURE: 62 MMHG | HEIGHT: 61 IN | BODY MASS INDEX: 29.49 KG/M2 | RESPIRATION RATE: 14 BRPM | SYSTOLIC BLOOD PRESSURE: 106 MMHG | WEIGHT: 156.2 LBS

## 2025-03-18 DIAGNOSIS — I10 ESSENTIAL HYPERTENSION, BENIGN: ICD-10-CM

## 2025-03-18 DIAGNOSIS — M06.09 RHEUMATOID ARTHRITIS OF MULTIPLE SITES WITH NEGATIVE RHEUMATOID FACTOR (HCC): ICD-10-CM

## 2025-03-18 DIAGNOSIS — E11.9 TYPE 2 DIABETES MELLITUS WITHOUT COMPLICATION, WITHOUT LONG-TERM CURRENT USE OF INSULIN: Primary | ICD-10-CM

## 2025-03-18 PROBLEM — E11.21 TYPE 2 DIABETES WITH NEPHROPATHY (HCC): Status: RESOLVED | Noted: 2018-07-16 | Resolved: 2025-03-18

## 2025-03-18 LAB — HBA1C MFR BLD: 7.3 %

## 2025-03-18 PROCEDURE — G8417 CALC BMI ABV UP PARAM F/U: HCPCS | Performed by: STUDENT IN AN ORGANIZED HEALTH CARE EDUCATION/TRAINING PROGRAM

## 2025-03-18 PROCEDURE — G0009 ADMIN PNEUMOCOCCAL VACCINE: HCPCS | Performed by: STUDENT IN AN ORGANIZED HEALTH CARE EDUCATION/TRAINING PROGRAM

## 2025-03-18 PROCEDURE — 1123F ACP DISCUSS/DSCN MKR DOCD: CPT | Performed by: STUDENT IN AN ORGANIZED HEALTH CARE EDUCATION/TRAINING PROGRAM

## 2025-03-18 PROCEDURE — 1036F TOBACCO NON-USER: CPT | Performed by: STUDENT IN AN ORGANIZED HEALTH CARE EDUCATION/TRAINING PROGRAM

## 2025-03-18 PROCEDURE — 1125F AMNT PAIN NOTED PAIN PRSNT: CPT | Performed by: STUDENT IN AN ORGANIZED HEALTH CARE EDUCATION/TRAINING PROGRAM

## 2025-03-18 PROCEDURE — 3017F COLORECTAL CA SCREEN DOC REV: CPT | Performed by: STUDENT IN AN ORGANIZED HEALTH CARE EDUCATION/TRAINING PROGRAM

## 2025-03-18 PROCEDURE — 83036 HEMOGLOBIN GLYCOSYLATED A1C: CPT | Performed by: STUDENT IN AN ORGANIZED HEALTH CARE EDUCATION/TRAINING PROGRAM

## 2025-03-18 PROCEDURE — 1159F MED LIST DOCD IN RCRD: CPT | Performed by: STUDENT IN AN ORGANIZED HEALTH CARE EDUCATION/TRAINING PROGRAM

## 2025-03-18 PROCEDURE — G8400 PT W/DXA NO RESULTS DOC: HCPCS | Performed by: STUDENT IN AN ORGANIZED HEALTH CARE EDUCATION/TRAINING PROGRAM

## 2025-03-18 PROCEDURE — 90677 PCV20 VACCINE IM: CPT | Performed by: STUDENT IN AN ORGANIZED HEALTH CARE EDUCATION/TRAINING PROGRAM

## 2025-03-18 PROCEDURE — G8427 DOCREV CUR MEDS BY ELIG CLIN: HCPCS | Performed by: STUDENT IN AN ORGANIZED HEALTH CARE EDUCATION/TRAINING PROGRAM

## 2025-03-18 PROCEDURE — 99214 OFFICE O/P EST MOD 30 MIN: CPT | Performed by: STUDENT IN AN ORGANIZED HEALTH CARE EDUCATION/TRAINING PROGRAM

## 2025-03-18 PROCEDURE — 1090F PRES/ABSN URINE INCON ASSESS: CPT | Performed by: STUDENT IN AN ORGANIZED HEALTH CARE EDUCATION/TRAINING PROGRAM

## 2025-03-18 PROCEDURE — 2022F DILAT RTA XM EVC RTNOPTHY: CPT | Performed by: STUDENT IN AN ORGANIZED HEALTH CARE EDUCATION/TRAINING PROGRAM

## 2025-03-18 PROCEDURE — 3074F SYST BP LT 130 MM HG: CPT | Performed by: STUDENT IN AN ORGANIZED HEALTH CARE EDUCATION/TRAINING PROGRAM

## 2025-03-18 PROCEDURE — 3046F HEMOGLOBIN A1C LEVEL >9.0%: CPT | Performed by: STUDENT IN AN ORGANIZED HEALTH CARE EDUCATION/TRAINING PROGRAM

## 2025-03-18 PROCEDURE — 3078F DIAST BP <80 MM HG: CPT | Performed by: STUDENT IN AN ORGANIZED HEALTH CARE EDUCATION/TRAINING PROGRAM

## 2025-03-18 PROCEDURE — 3051F HG A1C>EQUAL 7.0%<8.0%: CPT | Performed by: STUDENT IN AN ORGANIZED HEALTH CARE EDUCATION/TRAINING PROGRAM

## 2025-03-18 RX ORDER — METOPROLOL SUCCINATE 25 MG/1
25 TABLET, EXTENDED RELEASE ORAL DAILY
Qty: 90 TABLET | Refills: 1 | Status: SHIPPED | OUTPATIENT
Start: 2025-03-18

## 2025-03-18 ASSESSMENT — ENCOUNTER SYMPTOMS
ABDOMINAL PAIN: 0
EYE PAIN: 0
COUGH: 1
CHEST TIGHTNESS: 0
NAUSEA: 0
SORE THROAT: 0
CONSTIPATION: 0
BACK PAIN: 0
VOMITING: 0
SHORTNESS OF BREATH: 0
DIARRHEA: 0
RHINORRHEA: 0

## 2025-03-18 NOTE — PROGRESS NOTES
Nida Anthony is a 74 y.o. female (: 1950) presenting to address:    Chief Complaint   Patient presents with    Diabetes       Vitals:    25 1258   BP: 106/62   Pulse: 98   Resp: 14   Temp: 97.8 °F (36.6 °C)   SpO2: 98%       \"Have you been to the ER, urgent care clinic since your last visit?  Hospitalized since your last visit?\"    YES - When: approximately 1  weeks ago.  Where and Why: urgent care for cold sx.    “Have you seen or consulted any other health care providers outside of [facility] since your last visit?”    NO       Immunizations Administered       Name Date Dose Route    Pneumococcal, PCV20, PREVNAR 20, (age 6w+), IM, 0.5mL 3/18/2025 0.5 mL Intramuscular    Site: Deltoid- Left    Lot: [id number]    NDC: 2967-2229-25

## 2025-03-18 NOTE — PROGRESS NOTES
[facility]      MR#: [id number]    HISTORY OF PRESENT ILLNESS  History of Present Illness  The patient is a 74-year-old female who presents for a diabetes follow-up. Her A1c is 7.3.    She has been experiencing a severe cold for the past 2 weeks, which required antibiotic treatment prescribed by Kadi at urgent care. She also received cough medication and was using her nebulizer every 4 to 6 hours. She completed a course of prednisone yesterday. She reports no fever but experienced body aches and chills. Her cough has improved today. She has been sleeping in the living room to avoid disturbing her  due to her coughing. Her  also fell ill but recovered within 2 to 3 days.    She continues her Ozempic regimen without any adverse effects and has noticed weight loss, which she attributes to a decreased appetite and a diet consisting mainly of soup. She is currently on a 0.5 mg dose and wishes to maintain this dosage for another week. She reports persistent diarrhea and has self-adjusted her metformin dosage to once daily instead of twice. She has undergone an eye examination and is seeking a referral to an ophthalmologist. She experiences mild tingling in her feet and has a callus that causes discomfort, for which she uses Dr. Gallego's corn cushions. She is willing to consider surgical removal of the callus if necessary.    She is requesting a refill of her metoprolol prescription. Her blood pressure is well-controlled.    She is due for her annual blood work and is willing to have it done today. She received her influenza and COVID-19 vaccines in September. She received her first pneumonia vaccine approximately 10 years ago but did not receive the second dose. Her breast cancer and colon cancer screenings are up-to-date.    She reports that her sciatica is flaring up. She has pain medication at home prescribed by Dr. Fraire for her arthritis, which she took today and found helpful.

## 2025-03-19 ENCOUNTER — TELEPHONE (OUTPATIENT)
Dept: FAMILY MEDICINE CLINIC | Facility: CLINIC | Age: 75
End: 2025-03-19

## 2025-03-19 ENCOUNTER — RESULTS FOLLOW-UP (OUTPATIENT)
Dept: FAMILY MEDICINE CLINIC | Facility: CLINIC | Age: 75
End: 2025-03-19

## 2025-03-19 LAB
ALBUMIN SERPL-MCNC: 4.3 G/DL (ref 3.8–4.8)
ALBUMIN/CREAT UR: 11 MG/G CREAT (ref 0–29)
ALP SERPL-CCNC: 100 IU/L (ref 44–121)
ALT SERPL-CCNC: 9 IU/L (ref 0–32)
AST SERPL-CCNC: 15 IU/L (ref 0–40)
BASOPHILS # BLD AUTO: 0 X10E3/UL (ref 0–0.2)
BASOPHILS NFR BLD AUTO: 0 %
BILIRUB SERPL-MCNC: 0.3 MG/DL (ref 0–1.2)
BUN SERPL-MCNC: 40 MG/DL (ref 8–27)
BUN/CREAT SERPL: 28 (ref 12–28)
CALCIUM SERPL-MCNC: 9.6 MG/DL (ref 8.7–10.3)
CHLORIDE SERPL-SCNC: 101 MMOL/L (ref 96–106)
CHOLEST SERPL-MCNC: 158 MG/DL (ref 100–199)
CO2 SERPL-SCNC: 23 MMOL/L (ref 20–29)
CREAT SERPL-MCNC: 1.44 MG/DL (ref 0.57–1)
CREAT UR-MCNC: 164.1 MG/DL
EGFRCR SERPLBLD CKD-EPI 2021: 38 ML/MIN/1.73
EOSINOPHIL # BLD AUTO: 0.3 X10E3/UL (ref 0–0.4)
EOSINOPHIL NFR BLD AUTO: 3 %
ERYTHROCYTE [DISTWIDTH] IN BLOOD BY AUTOMATED COUNT: 12.8 % (ref 11.7–15.4)
GLOBULIN SER CALC-MCNC: 2.5 G/DL (ref 1.5–4.5)
GLUCOSE SERPL-MCNC: 137 MG/DL (ref 70–99)
HCT VFR BLD AUTO: 34.9 % (ref 34–46.6)
HDLC SERPL-MCNC: 96 MG/DL
HGB BLD-MCNC: 11.3 G/DL (ref 11.1–15.9)
IMM GRANULOCYTES # BLD AUTO: 0.1 X10E3/UL (ref 0–0.1)
IMM GRANULOCYTES NFR BLD AUTO: 1 %
LDLC SERPL CALC-MCNC: 44 MG/DL (ref 0–99)
LYMPHOCYTES # BLD AUTO: 1.8 X10E3/UL (ref 0.7–3.1)
LYMPHOCYTES NFR BLD AUTO: 18 %
MCH RBC QN AUTO: 29.6 PG (ref 26.6–33)
MCHC RBC AUTO-ENTMCNC: 32.4 G/DL (ref 31.5–35.7)
MCV RBC AUTO: 91 FL (ref 79–97)
MICROALBUMIN UR-MCNC: 18.7 UG/ML
MONOCYTES # BLD AUTO: 0.8 X10E3/UL (ref 0.1–0.9)
MONOCYTES NFR BLD AUTO: 8 %
NEUTROPHILS # BLD AUTO: 6.7 X10E3/UL (ref 1.4–7)
NEUTROPHILS NFR BLD AUTO: 70 %
PLATELET # BLD AUTO: 321 X10E3/UL (ref 150–450)
POTASSIUM SERPL-SCNC: 4 MMOL/L (ref 3.5–5.2)
PROT SERPL-MCNC: 6.8 G/DL (ref 6–8.5)
RBC # BLD AUTO: 3.82 X10E6/UL (ref 3.77–5.28)
SODIUM SERPL-SCNC: 141 MMOL/L (ref 134–144)
TRIGL SERPL-MCNC: 105 MG/DL (ref 0–149)
VLDLC SERPL CALC-MCNC: 18 MG/DL (ref 5–40)
WBC # BLD AUTO: 9.7 X10E3/UL (ref 3.4–10.8)

## 2025-03-19 NOTE — TELEPHONE ENCOUNTER
No. I updated the existing referral and will send it out later this afternoon or tomorrow morning.

## 2025-04-23 DIAGNOSIS — F51.04 CHRONIC INSOMNIA: ICD-10-CM

## 2025-04-23 RX ORDER — TRAZODONE HYDROCHLORIDE 150 MG/1
150 TABLET ORAL NIGHTLY
Qty: 90 TABLET | Refills: 1 | Status: SHIPPED | OUTPATIENT
Start: 2025-04-23

## 2025-04-24 DIAGNOSIS — M79.89 SWELLING OF BOTH LOWER EXTREMITIES: ICD-10-CM

## 2025-04-24 DIAGNOSIS — I10 ESSENTIAL HYPERTENSION, BENIGN: ICD-10-CM

## 2025-04-24 RX ORDER — AMLODIPINE BESYLATE 10 MG/1
10 TABLET ORAL DAILY
Qty: 90 TABLET | Refills: 1 | Status: SHIPPED | OUTPATIENT
Start: 2025-04-24

## 2025-04-24 RX ORDER — FUROSEMIDE 20 MG/1
20 TABLET ORAL DAILY PRN
Qty: 30 TABLET | Refills: 2 | Status: SHIPPED | OUTPATIENT
Start: 2025-04-24

## 2025-05-23 DIAGNOSIS — E11.9 TYPE 2 DIABETES MELLITUS WITHOUT COMPLICATION, WITHOUT LONG-TERM CURRENT USE OF INSULIN (HCC): ICD-10-CM

## 2025-06-13 ENCOUNTER — TELEPHONE (OUTPATIENT)
Dept: FAMILY MEDICINE CLINIC | Facility: CLINIC | Age: 75
End: 2025-06-13

## 2025-06-24 ENCOUNTER — OFFICE VISIT (OUTPATIENT)
Dept: FAMILY MEDICINE CLINIC | Facility: CLINIC | Age: 75
End: 2025-06-24
Payer: MEDICARE

## 2025-06-24 VITALS
BODY MASS INDEX: 28.92 KG/M2 | OXYGEN SATURATION: 95 % | HEART RATE: 93 BPM | RESPIRATION RATE: 15 BRPM | DIASTOLIC BLOOD PRESSURE: 60 MMHG | WEIGHT: 153.2 LBS | SYSTOLIC BLOOD PRESSURE: 110 MMHG | HEIGHT: 61 IN | TEMPERATURE: 98.1 F

## 2025-06-24 DIAGNOSIS — N28.9 ABNORMAL KIDNEY FUNCTION: ICD-10-CM

## 2025-06-24 DIAGNOSIS — Z00.00 MEDICARE ANNUAL WELLNESS VISIT, SUBSEQUENT: Primary | ICD-10-CM

## 2025-06-24 DIAGNOSIS — E11.9 TYPE 2 DIABETES MELLITUS WITHOUT COMPLICATION, WITHOUT LONG-TERM CURRENT USE OF INSULIN (HCC): ICD-10-CM

## 2025-06-24 DIAGNOSIS — I10 ESSENTIAL HYPERTENSION, BENIGN: ICD-10-CM

## 2025-06-24 LAB — HBA1C MFR BLD: 6.9 %

## 2025-06-24 PROCEDURE — G0439 PPPS, SUBSEQ VISIT: HCPCS | Performed by: STUDENT IN AN ORGANIZED HEALTH CARE EDUCATION/TRAINING PROGRAM

## 2025-06-24 PROCEDURE — 1123F ACP DISCUSS/DSCN MKR DOCD: CPT | Performed by: STUDENT IN AN ORGANIZED HEALTH CARE EDUCATION/TRAINING PROGRAM

## 2025-06-24 PROCEDURE — 83036 HEMOGLOBIN GLYCOSYLATED A1C: CPT | Performed by: STUDENT IN AN ORGANIZED HEALTH CARE EDUCATION/TRAINING PROGRAM

## 2025-06-24 PROCEDURE — 1159F MED LIST DOCD IN RCRD: CPT | Performed by: STUDENT IN AN ORGANIZED HEALTH CARE EDUCATION/TRAINING PROGRAM

## 2025-06-24 PROCEDURE — 3078F DIAST BP <80 MM HG: CPT | Performed by: STUDENT IN AN ORGANIZED HEALTH CARE EDUCATION/TRAINING PROGRAM

## 2025-06-24 PROCEDURE — 3044F HG A1C LEVEL LT 7.0%: CPT | Performed by: STUDENT IN AN ORGANIZED HEALTH CARE EDUCATION/TRAINING PROGRAM

## 2025-06-24 PROCEDURE — 1126F AMNT PAIN NOTED NONE PRSNT: CPT | Performed by: STUDENT IN AN ORGANIZED HEALTH CARE EDUCATION/TRAINING PROGRAM

## 2025-06-24 PROCEDURE — 3074F SYST BP LT 130 MM HG: CPT | Performed by: STUDENT IN AN ORGANIZED HEALTH CARE EDUCATION/TRAINING PROGRAM

## 2025-06-24 PROCEDURE — 1036F TOBACCO NON-USER: CPT | Performed by: STUDENT IN AN ORGANIZED HEALTH CARE EDUCATION/TRAINING PROGRAM

## 2025-06-24 PROCEDURE — G8417 CALC BMI ABV UP PARAM F/U: HCPCS | Performed by: STUDENT IN AN ORGANIZED HEALTH CARE EDUCATION/TRAINING PROGRAM

## 2025-06-24 PROCEDURE — 3017F COLORECTAL CA SCREEN DOC REV: CPT | Performed by: STUDENT IN AN ORGANIZED HEALTH CARE EDUCATION/TRAINING PROGRAM

## 2025-06-24 PROCEDURE — 1090F PRES/ABSN URINE INCON ASSESS: CPT | Performed by: STUDENT IN AN ORGANIZED HEALTH CARE EDUCATION/TRAINING PROGRAM

## 2025-06-24 PROCEDURE — G8400 PT W/DXA NO RESULTS DOC: HCPCS | Performed by: STUDENT IN AN ORGANIZED HEALTH CARE EDUCATION/TRAINING PROGRAM

## 2025-06-24 PROCEDURE — G8427 DOCREV CUR MEDS BY ELIG CLIN: HCPCS | Performed by: STUDENT IN AN ORGANIZED HEALTH CARE EDUCATION/TRAINING PROGRAM

## 2025-06-24 PROCEDURE — 2022F DILAT RTA XM EVC RTNOPTHY: CPT | Performed by: STUDENT IN AN ORGANIZED HEALTH CARE EDUCATION/TRAINING PROGRAM

## 2025-06-24 PROCEDURE — 99214 OFFICE O/P EST MOD 30 MIN: CPT | Performed by: STUDENT IN AN ORGANIZED HEALTH CARE EDUCATION/TRAINING PROGRAM

## 2025-06-24 PROCEDURE — 3046F HEMOGLOBIN A1C LEVEL >9.0%: CPT | Performed by: STUDENT IN AN ORGANIZED HEALTH CARE EDUCATION/TRAINING PROGRAM

## 2025-06-24 ASSESSMENT — ENCOUNTER SYMPTOMS
DIARRHEA: 0
NAUSEA: 0
SORE THROAT: 0
VOMITING: 0
CHEST TIGHTNESS: 0
EYE PAIN: 0
SHORTNESS OF BREATH: 0
CONSTIPATION: 0
BACK PAIN: 0
COUGH: 0
RHINORRHEA: 0
ABDOMINAL PAIN: 0

## 2025-06-24 ASSESSMENT — PATIENT HEALTH QUESTIONNAIRE - PHQ9
SUM OF ALL RESPONSES TO PHQ QUESTIONS 1-9: 0
1. LITTLE INTEREST OR PLEASURE IN DOING THINGS: NOT AT ALL
SUM OF ALL RESPONSES TO PHQ QUESTIONS 1-9: 0
SUM OF ALL RESPONSES TO PHQ QUESTIONS 1-9: 0
2. FEELING DOWN, DEPRESSED OR HOPELESS: NOT AT ALL
SUM OF ALL RESPONSES TO PHQ QUESTIONS 1-9: 0

## 2025-06-24 ASSESSMENT — LIFESTYLE VARIABLES
HOW OFTEN DO YOU HAVE A DRINK CONTAINING ALCOHOL: NEVER
HOW MANY STANDARD DRINKS CONTAINING ALCOHOL DO YOU HAVE ON A TYPICAL DAY: PATIENT DOES NOT DRINK

## 2025-06-24 NOTE — PROGRESS NOTES
Nida Anthony is a 74 y.o. female (: 1950) presenting to address:    Chief Complaint   Patient presents with    Medicare AWV       Vitals:    25 0959   BP: 110/60   Pulse: 93   Resp: 15   Temp: 98.1 °F (36.7 °C)   SpO2: 95%       \"Have you been to the ER, urgent care clinic since your last visit?  Hospitalized since your last visit?\"    NO    “Have you seen or consulted any other health care providers outside of [facility] since your last visit?”    NO

## 2025-06-24 NOTE — PROGRESS NOTES
[facility]      MR#: [id number]    HISTORY OF PRESENT ILLNESS  History of Present Illness  The patient is a 74-year-old female who presents for a 3-month follow-up. She has well-controlled type 2 diabetes, hypertension, asthma, COPD, and rheumatoid arthritis.    Blood work completed in 03/2025 showed a slight decrease in her glomerular filtration rate over the past year from 61 to 38 and an elevated creatinine of 1.44. It was thought that this might have been related to her Ozempic. The plan was to stop the Ozempic and monitor her blood sugar and kidney function today. A1c today is 6.9. She reports an improvement in her urinary flow since discontinuing Ozempic.    She experiences shortness of breath during physical exertion such as walking long distances and in humid conditions.    She also reports persistent cold sensations, necessitating the use of sweaters and blankets indoors, and notes that her hands are consistently cold. She had anemia a few years ago due to side effects from methotrexate, which she was taking for her rheumatoid arthritis. This led to her being on iron supplements for an extended period. However, she has since discontinued the iron supplements after being informed that she is no longer anemic.    She continues her regimen of losartan and furosemide daily.    Additionally, she mentions that her feet tend to swell slightly after walking.    Rheumatologist: Dr. Fraire   Pulmonologist: Dr. Bethea     Past medical history:  Type 2 diabetes  Hypertension  Moderate persistent asthma, history of intubation;hospitalization  Asthma COPD  Rheumatoid arthritis  Chronic insomnia  Lower back pain  Bilateral hearing loss  Bilateral hip replacement     Social:  Tobacco use: Former  Alcohol use: Denies  Illicit drug use: Denies  Housing: Lives in [place] with    Employment: Retired      Health maintenance:  Colon cancer screening: Due in 2027  Breast cancer screening: Due in

## 2025-06-24 NOTE — PROGRESS NOTES
Medicare Annual Wellness Visit    Nida Anthony is here for Medicare AWV    Assessment & Plan   Medicare annual wellness visit, subsequent  Type 2 diabetes mellitus without complication, without long-term current use of insulin (HCC)  -     AMB POC HEMOGLOBIN A1C  Abnormal kidney function  -     Basic Metabolic Panel; Future  Essential hypertension, benign       No follow-ups on file.     Subjective     Patient's complete Health Risk Assessment and screening values have been reviewed and are found in Flowsheets. The following problems were reviewed today and where indicated follow up appointments were made and/or referrals ordered.    Positive Risk Factor Screenings with Interventions:    Fall Risk:  Do you feel unsteady or are you worried about falling? : (!) yes  2 or more falls in past year?: (!) yes  Fall with injury in past year?: no     Interventions:    Reviewed medications, home hazards, visual acuity, and co-morbidities that can increase risk for falls             Inactivity:  On average, how many days per week do you engage in moderate to strenuous exercise (like a brisk walk)?: 0 days (!) Abnormal  On average, how many minutes do you engage in exercise at this level?: 0 min  Interventions:  See AVS for additional education material         Safety:  Do you always fasten your seatbelt when you are in a car?: (!) No  Interventions:  See AVS for additional education material               Objective   Vitals:    06/24/25 0959   BP: 110/60   BP Site: Left Upper Arm   Patient Position: Sitting   BP Cuff Size: Medium Adult   Pulse: 93   Resp: 15   Temp: 98.1 °F (36.7 °C)   TempSrc: Temporal   SpO2: 95%   Weight: 69.5 kg (153 lb 3.2 oz)   Height: 1.549 m (5' 1\")      Body mass index is 28.95 kg/m².                    Allergies   Allergen Reactions    Latex      Other reaction(s): Unknown (comments)    Cefdinir Rash     Prior to Visit Medications    Medication Sig Taking? Authorizing Provider   metFORMIN

## 2025-06-26 LAB — SPECIMEN STATUS REPORT: NORMAL

## 2025-06-27 ENCOUNTER — RESULTS FOLLOW-UP (OUTPATIENT)
Dept: FAMILY MEDICINE CLINIC | Facility: CLINIC | Age: 75
End: 2025-06-27

## 2025-06-27 LAB
BUN SERPL-MCNC: 20 MG/DL (ref 8–27)
BUN/CREAT SERPL: 18 (ref 12–28)
CALCIUM SERPL-MCNC: 9.4 MG/DL (ref 8.7–10.3)
CHLORIDE SERPL-SCNC: 104 MMOL/L (ref 96–106)
CO2 SERPL-SCNC: 19 MMOL/L (ref 20–29)
CREAT SERPL-MCNC: 1.13 MG/DL (ref 0.57–1)
EGFRCR SERPLBLD CKD-EPI 2021: 51 ML/MIN/1.73
GLUCOSE SERPL-MCNC: 134 MG/DL (ref 70–99)
POTASSIUM SERPL-SCNC: 4.3 MMOL/L (ref 3.5–5.2)
SODIUM SERPL-SCNC: 142 MMOL/L (ref 134–144)

## 2025-08-15 ENCOUNTER — OFFICE VISIT (OUTPATIENT)
Dept: FAMILY MEDICINE CLINIC | Facility: CLINIC | Age: 75
End: 2025-08-15
Payer: MEDICARE

## 2025-08-15 VITALS
OXYGEN SATURATION: 96 % | WEIGHT: 147 LBS | HEART RATE: 80 BPM | HEIGHT: 61 IN | SYSTOLIC BLOOD PRESSURE: 108 MMHG | TEMPERATURE: 97.9 F | BODY MASS INDEX: 27.75 KG/M2 | DIASTOLIC BLOOD PRESSURE: 62 MMHG | RESPIRATION RATE: 14 BRPM

## 2025-08-15 DIAGNOSIS — F43.22 ADJUSTMENT DISORDER WITH ANXIOUS MOOD: Primary | ICD-10-CM

## 2025-08-15 PROCEDURE — 3078F DIAST BP <80 MM HG: CPT | Performed by: STUDENT IN AN ORGANIZED HEALTH CARE EDUCATION/TRAINING PROGRAM

## 2025-08-15 PROCEDURE — 1125F AMNT PAIN NOTED PAIN PRSNT: CPT | Performed by: STUDENT IN AN ORGANIZED HEALTH CARE EDUCATION/TRAINING PROGRAM

## 2025-08-15 PROCEDURE — 3074F SYST BP LT 130 MM HG: CPT | Performed by: STUDENT IN AN ORGANIZED HEALTH CARE EDUCATION/TRAINING PROGRAM

## 2025-08-15 PROCEDURE — 1123F ACP DISCUSS/DSCN MKR DOCD: CPT | Performed by: STUDENT IN AN ORGANIZED HEALTH CARE EDUCATION/TRAINING PROGRAM

## 2025-08-15 PROCEDURE — 1159F MED LIST DOCD IN RCRD: CPT | Performed by: STUDENT IN AN ORGANIZED HEALTH CARE EDUCATION/TRAINING PROGRAM

## 2025-08-15 PROCEDURE — 99214 OFFICE O/P EST MOD 30 MIN: CPT | Performed by: STUDENT IN AN ORGANIZED HEALTH CARE EDUCATION/TRAINING PROGRAM

## 2025-08-15 PROCEDURE — 3017F COLORECTAL CA SCREEN DOC REV: CPT | Performed by: STUDENT IN AN ORGANIZED HEALTH CARE EDUCATION/TRAINING PROGRAM

## 2025-08-15 PROCEDURE — 1036F TOBACCO NON-USER: CPT | Performed by: STUDENT IN AN ORGANIZED HEALTH CARE EDUCATION/TRAINING PROGRAM

## 2025-08-15 PROCEDURE — G8427 DOCREV CUR MEDS BY ELIG CLIN: HCPCS | Performed by: STUDENT IN AN ORGANIZED HEALTH CARE EDUCATION/TRAINING PROGRAM

## 2025-08-15 PROCEDURE — 1090F PRES/ABSN URINE INCON ASSESS: CPT | Performed by: STUDENT IN AN ORGANIZED HEALTH CARE EDUCATION/TRAINING PROGRAM

## 2025-08-15 PROCEDURE — G8400 PT W/DXA NO RESULTS DOC: HCPCS | Performed by: STUDENT IN AN ORGANIZED HEALTH CARE EDUCATION/TRAINING PROGRAM

## 2025-08-15 PROCEDURE — G8417 CALC BMI ABV UP PARAM F/U: HCPCS | Performed by: STUDENT IN AN ORGANIZED HEALTH CARE EDUCATION/TRAINING PROGRAM

## 2025-08-15 RX ORDER — ESCITALOPRAM OXALATE 10 MG/1
10 TABLET ORAL DAILY
Qty: 90 TABLET | Refills: 1 | Status: SHIPPED | OUTPATIENT
Start: 2025-08-15

## 2025-08-15 RX ORDER — UBIDECARENONE 75 MG
50 CAPSULE ORAL DAILY
COMMUNITY

## 2025-08-15 RX ORDER — DIAZEPAM 2 MG/1
2 TABLET ORAL EVERY 8 HOURS PRN
Qty: 30 TABLET | Refills: 0 | Status: SHIPPED | OUTPATIENT
Start: 2025-08-15 | End: 2025-08-25

## 2025-08-15 ASSESSMENT — PATIENT HEALTH QUESTIONNAIRE - PHQ9
SUM OF ALL RESPONSES TO PHQ QUESTIONS 1-9: 16
9. THOUGHTS THAT YOU WOULD BE BETTER OFF DEAD, OR OF HURTING YOURSELF: NOT AT ALL
SUM OF ALL RESPONSES TO PHQ QUESTIONS 1-9: 16
7. TROUBLE CONCENTRATING ON THINGS, SUCH AS READING THE NEWSPAPER OR WATCHING TELEVISION: NOT AT ALL
8. MOVING OR SPEAKING SO SLOWLY THAT OTHER PEOPLE COULD HAVE NOTICED. OR THE OPPOSITE, BEING SO FIGETY OR RESTLESS THAT YOU HAVE BEEN MOVING AROUND A LOT MORE THAN USUAL: NOT AT ALL
3. TROUBLE FALLING OR STAYING ASLEEP: NEARLY EVERY DAY
2. FEELING DOWN, DEPRESSED OR HOPELESS: NEARLY EVERY DAY
SUM OF ALL RESPONSES TO PHQ QUESTIONS 1-9: 16
1. LITTLE INTEREST OR PLEASURE IN DOING THINGS: NEARLY EVERY DAY
4. FEELING TIRED OR HAVING LITTLE ENERGY: NEARLY EVERY DAY
5. POOR APPETITE OR OVEREATING: NEARLY EVERY DAY
SUM OF ALL RESPONSES TO PHQ QUESTIONS 1-9: 16
6. FEELING BAD ABOUT YOURSELF - OR THAT YOU ARE A FAILURE OR HAVE LET YOURSELF OR YOUR FAMILY DOWN: SEVERAL DAYS

## 2025-08-15 ASSESSMENT — ENCOUNTER SYMPTOMS
BACK PAIN: 0
VOMITING: 0
DIARRHEA: 0
RHINORRHEA: 0
ABDOMINAL PAIN: 0
NAUSEA: 0
COUGH: 0
SORE THROAT: 0
SHORTNESS OF BREATH: 0
CHEST TIGHTNESS: 0
CONSTIPATION: 0
EYE PAIN: 0

## 2025-08-20 DIAGNOSIS — M79.89 SWELLING OF BOTH LOWER EXTREMITIES: ICD-10-CM

## 2025-08-20 RX ORDER — FUROSEMIDE 20 MG/1
20 TABLET ORAL DAILY PRN
Qty: 30 TABLET | Refills: 2 | Status: SHIPPED | OUTPATIENT
Start: 2025-08-20 | End: 2025-08-22 | Stop reason: SDUPTHER

## 2025-08-21 DIAGNOSIS — I10 ESSENTIAL HYPERTENSION, BENIGN: ICD-10-CM

## 2025-08-21 DIAGNOSIS — F51.04 CHRONIC INSOMNIA: ICD-10-CM

## 2025-08-21 DIAGNOSIS — E11.9 TYPE 2 DIABETES MELLITUS WITHOUT COMPLICATION, WITHOUT LONG-TERM CURRENT USE OF INSULIN (HCC): ICD-10-CM

## 2025-08-21 RX ORDER — AMLODIPINE BESYLATE 10 MG/1
10 TABLET ORAL DAILY
Qty: 90 TABLET | Refills: 1 | Status: SHIPPED | OUTPATIENT
Start: 2025-08-21

## 2025-08-21 RX ORDER — CLONIDINE HYDROCHLORIDE 0.1 MG/1
TABLET ORAL
Qty: 270 TABLET | Refills: 1 | Status: SHIPPED | OUTPATIENT
Start: 2025-08-21

## 2025-08-21 RX ORDER — PRAVASTATIN SODIUM 20 MG
20 TABLET ORAL DAILY
Qty: 90 TABLET | Refills: 1 | Status: SHIPPED | OUTPATIENT
Start: 2025-08-21

## 2025-08-21 RX ORDER — HYDROCHLOROTHIAZIDE 25 MG/1
25 TABLET ORAL DAILY
Qty: 90 TABLET | Refills: 1 | Status: SHIPPED | OUTPATIENT
Start: 2025-08-21

## 2025-08-21 RX ORDER — METOPROLOL SUCCINATE 25 MG/1
25 TABLET, EXTENDED RELEASE ORAL DAILY
Qty: 90 TABLET | Refills: 1 | Status: SHIPPED | OUTPATIENT
Start: 2025-08-21

## 2025-08-21 RX ORDER — TRAZODONE HYDROCHLORIDE 150 MG/1
150 TABLET ORAL NIGHTLY
Qty: 90 TABLET | Refills: 1 | Status: SHIPPED | OUTPATIENT
Start: 2025-08-21

## 2025-08-22 DIAGNOSIS — M79.89 SWELLING OF BOTH LOWER EXTREMITIES: ICD-10-CM

## 2025-08-22 DIAGNOSIS — E11.9 TYPE 2 DIABETES MELLITUS WITHOUT COMPLICATION, WITHOUT LONG-TERM CURRENT USE OF INSULIN (HCC): ICD-10-CM

## 2025-08-22 RX ORDER — FUROSEMIDE 20 MG/1
20 TABLET ORAL DAILY PRN
Qty: 30 TABLET | Refills: 2 | Status: SHIPPED | OUTPATIENT
Start: 2025-08-22